# Patient Record
Sex: FEMALE | Race: WHITE | Employment: OTHER | URBAN - METROPOLITAN AREA
[De-identification: names, ages, dates, MRNs, and addresses within clinical notes are randomized per-mention and may not be internally consistent; named-entity substitution may affect disease eponyms.]

---

## 2017-01-25 ENCOUNTER — OFFICE VISIT (OUTPATIENT)
Dept: FAMILY MEDICINE CLINIC | Age: 67
End: 2017-01-25

## 2017-01-25 VITALS
HEIGHT: 62 IN | BODY MASS INDEX: 38.39 KG/M2 | HEART RATE: 80 BPM | DIASTOLIC BLOOD PRESSURE: 80 MMHG | WEIGHT: 208.6 LBS | OXYGEN SATURATION: 98 % | RESPIRATION RATE: 15 BRPM | SYSTOLIC BLOOD PRESSURE: 124 MMHG | TEMPERATURE: 97.6 F

## 2017-01-25 DIAGNOSIS — E03.9 ACQUIRED HYPOTHYROIDISM: Primary | ICD-10-CM

## 2017-01-25 DIAGNOSIS — I10 ESSENTIAL HYPERTENSION: ICD-10-CM

## 2017-01-25 PROCEDURE — 99213 OFFICE O/P EST LOW 20 MIN: CPT | Performed by: NURSE PRACTITIONER

## 2017-01-25 RX ORDER — LISINOPRIL AND HYDROCHLOROTHIAZIDE 25; 20 MG/1; MG/1
TABLET ORAL
Qty: 90 TABLET | Refills: 0 | Status: SHIPPED | OUTPATIENT
Start: 2017-01-25 | End: 2017-04-19 | Stop reason: SDUPTHER

## 2017-01-25 RX ORDER — ETODOLAC 400 MG/1
400 TABLET, FILM COATED ORAL 2 TIMES DAILY
Qty: 180 TABLET | Refills: 0 | Status: SHIPPED | OUTPATIENT
Start: 2017-01-25 | End: 2017-04-19 | Stop reason: SDUPTHER

## 2017-01-25 RX ORDER — LEVOTHYROXINE AND LIOTHYRONINE 76; 18 UG/1; UG/1
TABLET ORAL
Qty: 90 TABLET | Refills: 0 | Status: SHIPPED | OUTPATIENT
Start: 2017-01-25 | End: 2017-04-19 | Stop reason: SDUPTHER

## 2017-01-25 RX ORDER — TIZANIDINE 4 MG/1
4 TABLET ORAL EVERY 8 HOURS PRN
Qty: 90 TABLET | Refills: 0 | Status: SHIPPED | OUTPATIENT
Start: 2017-01-25 | End: 2017-04-25

## 2017-01-25 ASSESSMENT — ENCOUNTER SYMPTOMS
RESPIRATORY NEGATIVE: 1
ALLERGIC/IMMUNOLOGIC NEGATIVE: 1
EYES NEGATIVE: 1
GASTROINTESTINAL NEGATIVE: 1

## 2017-02-02 ENCOUNTER — OFFICE VISIT (OUTPATIENT)
Dept: ORTHOPEDIC SURGERY | Age: 67
End: 2017-02-02

## 2017-02-02 VITALS — HEIGHT: 62 IN | WEIGHT: 208 LBS | BODY MASS INDEX: 38.28 KG/M2

## 2017-02-02 DIAGNOSIS — G89.29 CHRONIC BILATERAL LOW BACK PAIN WITH LEFT-SIDED SCIATICA: ICD-10-CM

## 2017-02-02 DIAGNOSIS — M54.42 CHRONIC BILATERAL LOW BACK PAIN WITH LEFT-SIDED SCIATICA: ICD-10-CM

## 2017-02-02 DIAGNOSIS — M25.552 LEFT HIP PAIN: Primary | ICD-10-CM

## 2017-02-02 PROCEDURE — 99203 OFFICE O/P NEW LOW 30 MIN: CPT | Performed by: ORTHOPAEDIC SURGERY

## 2017-02-02 PROCEDURE — 73502 X-RAY EXAM HIP UNI 2-3 VIEWS: CPT | Performed by: ORTHOPAEDIC SURGERY

## 2017-02-02 PROCEDURE — 72100 X-RAY EXAM L-S SPINE 2/3 VWS: CPT | Performed by: ORTHOPAEDIC SURGERY

## 2017-02-02 RX ORDER — PREDNISONE 10 MG/1
TABLET ORAL
Qty: 30 TABLET | Refills: 0 | Status: SHIPPED | OUTPATIENT
Start: 2017-02-02 | End: 2017-09-12 | Stop reason: ALTCHOICE

## 2017-02-10 RX ORDER — CONJUGATED ESTROGENS 0.62 MG/1
TABLET, FILM COATED ORAL
Qty: 90 TABLET | Refills: 0 | OUTPATIENT
Start: 2017-02-10

## 2017-04-19 ENCOUNTER — OFFICE VISIT (OUTPATIENT)
Dept: FAMILY MEDICINE CLINIC | Age: 67
End: 2017-04-19

## 2017-04-19 VITALS
WEIGHT: 208.4 LBS | HEART RATE: 80 BPM | SYSTOLIC BLOOD PRESSURE: 124 MMHG | BODY MASS INDEX: 38.35 KG/M2 | RESPIRATION RATE: 16 BRPM | HEIGHT: 62 IN | DIASTOLIC BLOOD PRESSURE: 80 MMHG | OXYGEN SATURATION: 98 %

## 2017-04-19 DIAGNOSIS — E03.9 ACQUIRED HYPOTHYROIDISM: ICD-10-CM

## 2017-04-19 DIAGNOSIS — I10 ESSENTIAL HYPERTENSION: ICD-10-CM

## 2017-04-19 DIAGNOSIS — E55.9 VITAMIN D DEFICIENCY: ICD-10-CM

## 2017-04-19 DIAGNOSIS — M54.50 CHRONIC BILATERAL LOW BACK PAIN WITHOUT SCIATICA: Primary | ICD-10-CM

## 2017-04-19 DIAGNOSIS — N95.1 MENOPAUSAL SYMPTOMS: ICD-10-CM

## 2017-04-19 DIAGNOSIS — E78.00 HYPERCHOLESTEREMIA: ICD-10-CM

## 2017-04-19 DIAGNOSIS — G89.29 CHRONIC BILATERAL LOW BACK PAIN WITHOUT SCIATICA: Primary | ICD-10-CM

## 2017-04-19 PROCEDURE — 3014F SCREEN MAMMO DOC REV: CPT | Performed by: FAMILY MEDICINE

## 2017-04-19 PROCEDURE — 1036F TOBACCO NON-USER: CPT | Performed by: FAMILY MEDICINE

## 2017-04-19 PROCEDURE — G8427 DOCREV CUR MEDS BY ELIG CLIN: HCPCS | Performed by: FAMILY MEDICINE

## 2017-04-19 PROCEDURE — 1123F ACP DISCUSS/DSCN MKR DOCD: CPT | Performed by: FAMILY MEDICINE

## 2017-04-19 PROCEDURE — 4040F PNEUMOC VAC/ADMIN/RCVD: CPT | Performed by: FAMILY MEDICINE

## 2017-04-19 PROCEDURE — 3017F COLORECTAL CA SCREEN DOC REV: CPT | Performed by: FAMILY MEDICINE

## 2017-04-19 PROCEDURE — 1090F PRES/ABSN URINE INCON ASSESS: CPT | Performed by: FAMILY MEDICINE

## 2017-04-19 PROCEDURE — G8400 PT W/DXA NO RESULTS DOC: HCPCS | Performed by: FAMILY MEDICINE

## 2017-04-19 PROCEDURE — G8417 CALC BMI ABV UP PARAM F/U: HCPCS | Performed by: FAMILY MEDICINE

## 2017-04-19 PROCEDURE — 99214 OFFICE O/P EST MOD 30 MIN: CPT | Performed by: FAMILY MEDICINE

## 2017-04-19 RX ORDER — LISINOPRIL AND HYDROCHLOROTHIAZIDE 25; 20 MG/1; MG/1
TABLET ORAL
Qty: 90 TABLET | Refills: 3 | Status: SHIPPED | OUTPATIENT
Start: 2017-04-19 | End: 2017-08-22 | Stop reason: SDUPTHER

## 2017-04-19 RX ORDER — ETODOLAC 400 MG/1
400 TABLET, FILM COATED ORAL 2 TIMES DAILY
Qty: 180 TABLET | Refills: 3 | Status: SHIPPED | OUTPATIENT
Start: 2017-04-19 | End: 2018-06-12 | Stop reason: ALTCHOICE

## 2017-04-19 RX ORDER — ESTRADIOL 1 MG/1
.5-1 TABLET ORAL DAILY
Qty: 90 TABLET | Refills: 1 | Status: SHIPPED | OUTPATIENT
Start: 2017-04-19 | End: 2017-08-22 | Stop reason: SDUPTHER

## 2017-04-19 RX ORDER — LEVOTHYROXINE AND LIOTHYRONINE 76; 18 UG/1; UG/1
TABLET ORAL
Qty: 90 TABLET | Refills: 3 | Status: SHIPPED | OUTPATIENT
Start: 2017-04-19 | End: 2017-08-22 | Stop reason: SDUPTHER

## 2017-04-21 ENCOUNTER — NURSE ONLY (OUTPATIENT)
Dept: FAMILY MEDICINE CLINIC | Age: 67
End: 2017-04-21

## 2017-04-21 DIAGNOSIS — E78.00 HYPERCHOLESTEREMIA: ICD-10-CM

## 2017-04-21 DIAGNOSIS — I10 ESSENTIAL HYPERTENSION: ICD-10-CM

## 2017-04-21 DIAGNOSIS — E55.9 VITAMIN D DEFICIENCY: ICD-10-CM

## 2017-04-21 DIAGNOSIS — E03.9 ACQUIRED HYPOTHYROIDISM: ICD-10-CM

## 2017-04-21 LAB
A/G RATIO: 1.4 (ref 1.1–2.2)
ALBUMIN SERPL-MCNC: 4 G/DL (ref 3.4–5)
ALP BLD-CCNC: 88 U/L (ref 40–129)
ALT SERPL-CCNC: 14 U/L (ref 10–40)
ANION GAP SERPL CALCULATED.3IONS-SCNC: 13 MMOL/L (ref 3–16)
AST SERPL-CCNC: 13 U/L (ref 15–37)
BILIRUB SERPL-MCNC: 0.4 MG/DL (ref 0–1)
BUN BLDV-MCNC: 12 MG/DL (ref 7–20)
CALCIUM SERPL-MCNC: 9.5 MG/DL (ref 8.3–10.6)
CHLORIDE BLD-SCNC: 101 MMOL/L (ref 99–110)
CHOLESTEROL, TOTAL: 184 MG/DL (ref 0–199)
CO2: 28 MMOL/L (ref 21–32)
CREAT SERPL-MCNC: <0.5 MG/DL (ref 0.6–1.2)
GFR AFRICAN AMERICAN: >60
GFR NON-AFRICAN AMERICAN: >60
GLOBULIN: 2.8 G/DL
GLUCOSE BLD-MCNC: 93 MG/DL (ref 70–99)
HDLC SERPL-MCNC: 54 MG/DL (ref 40–60)
LDL CHOLESTEROL CALCULATED: 97 MG/DL
POTASSIUM SERPL-SCNC: 4.5 MMOL/L (ref 3.5–5.1)
SODIUM BLD-SCNC: 142 MMOL/L (ref 136–145)
T4 TOTAL: 8.2 UG/DL (ref 4.5–10.9)
TOTAL PROTEIN: 6.8 G/DL (ref 6.4–8.2)
TRIGL SERPL-MCNC: 167 MG/DL (ref 0–150)
TSH SERPL DL<=0.05 MIU/L-ACNC: 0.02 UIU/ML (ref 0.27–4.2)
VITAMIN D 25-HYDROXY: 32.4 NG/ML
VLDLC SERPL CALC-MCNC: 33 MG/DL

## 2017-04-21 PROCEDURE — 36415 COLL VENOUS BLD VENIPUNCTURE: CPT | Performed by: FAMILY MEDICINE

## 2017-05-12 ENCOUNTER — HOSPITAL ENCOUNTER (OUTPATIENT)
Dept: ENDOSCOPY | Age: 67
Discharge: HOME | End: 2017-05-12
Attending: INTERNAL MEDICINE | Admitting: INTERNAL MEDICINE

## 2017-08-22 RX ORDER — ESTRADIOL 1 MG/1
.5-1 TABLET ORAL DAILY
Qty: 90 TABLET | Refills: 3 | Status: SHIPPED | OUTPATIENT
Start: 2017-08-22 | End: 2018-10-08 | Stop reason: SDUPTHER

## 2017-08-22 RX ORDER — LISINOPRIL AND HYDROCHLOROTHIAZIDE 25; 20 MG/1; MG/1
TABLET ORAL
Qty: 90 TABLET | Refills: 3 | Status: SHIPPED | OUTPATIENT
Start: 2017-08-22 | End: 2018-06-12 | Stop reason: SDUPTHER

## 2017-08-22 RX ORDER — LEVOTHYROXINE AND LIOTHYRONINE 76; 18 UG/1; UG/1
TABLET ORAL
Qty: 90 TABLET | Refills: 3 | Status: SHIPPED | OUTPATIENT
Start: 2017-08-22 | End: 2017-09-12 | Stop reason: SDUPTHER

## 2017-08-25 ENCOUNTER — TELEPHONE (OUTPATIENT)
Dept: FAMILY MEDICINE CLINIC | Age: 67
End: 2017-08-25

## 2017-08-25 RX ORDER — OMEPRAZOLE 40 MG/1
40 CAPSULE, DELAYED RELEASE ORAL DAILY
Qty: 30 CAPSULE | Refills: 2 | Status: SHIPPED | OUTPATIENT
Start: 2017-08-25 | End: 2017-09-12 | Stop reason: SDUPTHER

## 2017-09-12 ENCOUNTER — OFFICE VISIT (OUTPATIENT)
Dept: FAMILY MEDICINE CLINIC | Age: 67
End: 2017-09-12

## 2017-09-12 VITALS
HEART RATE: 72 BPM | OXYGEN SATURATION: 98 % | HEIGHT: 62 IN | SYSTOLIC BLOOD PRESSURE: 118 MMHG | DIASTOLIC BLOOD PRESSURE: 74 MMHG | WEIGHT: 210 LBS | RESPIRATION RATE: 10 BRPM | BODY MASS INDEX: 38.64 KG/M2

## 2017-09-12 DIAGNOSIS — M25.552 LEFT HIP PAIN: ICD-10-CM

## 2017-09-12 DIAGNOSIS — Z23 NEED FOR PNEUMOCOCCAL VACCINATION: ICD-10-CM

## 2017-09-12 DIAGNOSIS — R00.2 PALPITATIONS: ICD-10-CM

## 2017-09-12 DIAGNOSIS — K21.9 GASTROESOPHAGEAL REFLUX DISEASE, ESOPHAGITIS PRESENCE NOT SPECIFIED: ICD-10-CM

## 2017-09-12 DIAGNOSIS — R63.5 WEIGHT GAIN: Primary | ICD-10-CM

## 2017-09-12 DIAGNOSIS — E03.9 ACQUIRED HYPOTHYROIDISM: ICD-10-CM

## 2017-09-12 DIAGNOSIS — R53.82 CHRONIC FATIGUE: ICD-10-CM

## 2017-09-12 DIAGNOSIS — R06.83 SNORING: ICD-10-CM

## 2017-09-12 DIAGNOSIS — R45.89 DEPRESSED MOOD: ICD-10-CM

## 2017-09-12 DIAGNOSIS — M25.562 ACUTE PAIN OF LEFT KNEE: ICD-10-CM

## 2017-09-12 DIAGNOSIS — I10 ESSENTIAL HYPERTENSION: ICD-10-CM

## 2017-09-12 PROCEDURE — 99214 OFFICE O/P EST MOD 30 MIN: CPT | Performed by: FAMILY MEDICINE

## 2017-09-12 PROCEDURE — G0009 ADMIN PNEUMOCOCCAL VACCINE: HCPCS | Performed by: FAMILY MEDICINE

## 2017-09-12 PROCEDURE — 90670 PCV13 VACCINE IM: CPT | Performed by: FAMILY MEDICINE

## 2017-09-12 PROCEDURE — 93000 ELECTROCARDIOGRAM COMPLETE: CPT | Performed by: FAMILY MEDICINE

## 2017-09-12 RX ORDER — OMEPRAZOLE 40 MG/1
40 CAPSULE, DELAYED RELEASE ORAL DAILY
Qty: 90 CAPSULE | Refills: 3 | Status: SHIPPED | OUTPATIENT
Start: 2017-09-12 | End: 2018-10-08 | Stop reason: SDUPTHER

## 2017-09-12 RX ORDER — LEVOTHYROXINE AND LIOTHYRONINE 76; 18 UG/1; UG/1
TABLET ORAL
Qty: 90 TABLET | Refills: 3 | Status: SHIPPED | OUTPATIENT
Start: 2017-09-12 | End: 2018-10-08 | Stop reason: SDUPTHER

## 2017-09-12 ASSESSMENT — PATIENT HEALTH QUESTIONNAIRE - PHQ9
SUM OF ALL RESPONSES TO PHQ QUESTIONS 1-9: 0
1. LITTLE INTEREST OR PLEASURE IN DOING THINGS: 0
SUM OF ALL RESPONSES TO PHQ9 QUESTIONS 1 & 2: 0
2. FEELING DOWN, DEPRESSED OR HOPELESS: 0

## 2017-09-18 ENCOUNTER — OFFICE VISIT (OUTPATIENT)
Dept: SLEEP MEDICINE | Age: 67
End: 2017-09-18

## 2017-09-18 VITALS
RESPIRATION RATE: 20 BRPM | BODY MASS INDEX: 38.87 KG/M2 | SYSTOLIC BLOOD PRESSURE: 126 MMHG | OXYGEN SATURATION: 97 % | HEART RATE: 89 BPM | HEIGHT: 62 IN | WEIGHT: 211.2 LBS | DIASTOLIC BLOOD PRESSURE: 80 MMHG

## 2017-09-18 DIAGNOSIS — G47.33 OSA (OBSTRUCTIVE SLEEP APNEA): Primary | ICD-10-CM

## 2017-09-18 PROCEDURE — 1036F TOBACCO NON-USER: CPT | Performed by: PSYCHIATRY & NEUROLOGY

## 2017-09-18 PROCEDURE — G8400 PT W/DXA NO RESULTS DOC: HCPCS | Performed by: PSYCHIATRY & NEUROLOGY

## 2017-09-18 PROCEDURE — 1090F PRES/ABSN URINE INCON ASSESS: CPT | Performed by: PSYCHIATRY & NEUROLOGY

## 2017-09-18 PROCEDURE — 99204 OFFICE O/P NEW MOD 45 MIN: CPT | Performed by: PSYCHIATRY & NEUROLOGY

## 2017-09-18 PROCEDURE — 3017F COLORECTAL CA SCREEN DOC REV: CPT | Performed by: PSYCHIATRY & NEUROLOGY

## 2017-09-18 PROCEDURE — G8417 CALC BMI ABV UP PARAM F/U: HCPCS | Performed by: PSYCHIATRY & NEUROLOGY

## 2017-09-18 PROCEDURE — 3014F SCREEN MAMMO DOC REV: CPT | Performed by: PSYCHIATRY & NEUROLOGY

## 2017-09-18 PROCEDURE — G8427 DOCREV CUR MEDS BY ELIG CLIN: HCPCS | Performed by: PSYCHIATRY & NEUROLOGY

## 2017-09-18 PROCEDURE — 4040F PNEUMOC VAC/ADMIN/RCVD: CPT | Performed by: PSYCHIATRY & NEUROLOGY

## 2017-09-18 PROCEDURE — 1123F ACP DISCUSS/DSCN MKR DOCD: CPT | Performed by: PSYCHIATRY & NEUROLOGY

## 2017-09-18 ASSESSMENT — ENCOUNTER SYMPTOMS
GASTROINTESTINAL NEGATIVE: 1
WHEEZING: 1
ALLERGIC/IMMUNOLOGIC NEGATIVE: 1
COUGH: 1
EYES NEGATIVE: 1

## 2017-09-18 ASSESSMENT — SLEEP AND FATIGUE QUESTIONNAIRES
HOW LIKELY ARE YOU TO NOD OFF OR FALL ASLEEP WHILE SITTING AND READING: 0
HOW LIKELY ARE YOU TO NOD OFF OR FALL ASLEEP WHEN YOU ARE A PASSENGER IN A CAR FOR AN HOUR WITHOUT A BREAK: 3
HOW LIKELY ARE YOU TO NOD OFF OR FALL ASLEEP WHILE SITTING QUIETLY AFTER LUNCH WITHOUT ALCOHOL: 0
HOW LIKELY ARE YOU TO NOD OFF OR FALL ASLEEP WHILE LYING DOWN TO REST IN THE AFTERNOON WHEN CIRCUMSTANCES PERMIT: 3
ESS TOTAL SCORE: 9
HOW LIKELY ARE YOU TO NOD OFF OR FALL ASLEEP WHILE SITTING AND TALKING TO SOMEONE: 0
NECK CIRCUMFERENCE (INCHES): 14.75
HOW LIKELY ARE YOU TO NOD OFF OR FALL ASLEEP WHILE SITTING INACTIVE IN A PUBLIC PLACE: 0
HOW LIKELY ARE YOU TO NOD OFF OR FALL ASLEEP IN A CAR, WHILE STOPPED FOR A FEW MINUTES IN TRAFFIC: 0
HOW LIKELY ARE YOU TO NOD OFF OR FALL ASLEEP WHILE WATCHING TV: 3

## 2017-10-07 ENCOUNTER — HOSPITAL ENCOUNTER (OUTPATIENT)
Dept: OTHER | Age: 67
Discharge: HOME | End: 2017-10-08
Attending: PSYCHIATRY & NEUROLOGY | Admitting: PSYCHIATRY & NEUROLOGY

## 2017-10-07 DIAGNOSIS — G47.33 OSA (OBSTRUCTIVE SLEEP APNEA): ICD-10-CM

## 2017-10-12 ENCOUNTER — TELEPHONE (OUTPATIENT)
Dept: PULMONOLOGY | Age: 67
End: 2017-10-12

## 2017-10-12 NOTE — TELEPHONE ENCOUNTER
Sleep study shows Mild BHUPENDRA with 7.2 events per hr and O2 desaturations to 86%    Dr Marcus Maciel advised

## 2017-10-25 ENCOUNTER — HOSPITAL ENCOUNTER (OUTPATIENT)
Dept: OTHER | Age: 67
Discharge: HOME | End: 2017-10-27
Admitting: DERMATOLOGY

## 2017-10-25 DIAGNOSIS — G47.33 OSA (OBSTRUCTIVE SLEEP APNEA): ICD-10-CM

## 2017-10-31 ENCOUNTER — TELEPHONE (OUTPATIENT)
Dept: PULMONOLOGY | Age: 67
End: 2017-10-31

## 2017-10-31 NOTE — TELEPHONE ENCOUNTER
Spoke to the patient regarding her  Titration results. She would like her orders sent to [address] .  She will call back to schedule f/u

## 2017-11-20 ENCOUNTER — TELEPHONE (OUTPATIENT)
Dept: PULMONOLOGY | Age: 67
End: 2017-11-20

## 2017-11-20 NOTE — TELEPHONE ENCOUNTER
Patient returned call. Gave her Sherri's message that she is still working on the order and will call patient back once she finds something out. Patient said thank you.

## 2017-11-20 NOTE — TELEPHONE ENCOUNTER
Pt called states has not heard from [address], spoke with [facility] who has emailed the [employer] and BILLIE Evangelista on 11/17/17 to find a status on order as of today [facility] the [employer] rep has not heard back from [address].   Left message to return call

## 2017-11-22 ENCOUNTER — TELEPHONE (OUTPATIENT)
Dept: PULMONOLOGY | Age: 67
End: 2017-11-22

## 2018-01-02 ENCOUNTER — OFFICE VISIT (OUTPATIENT)
Dept: ORTHOPEDIC SURGERY | Age: 68
End: 2018-01-02

## 2018-01-02 VITALS — HEIGHT: 62 IN | BODY MASS INDEX: 38.83 KG/M2 | WEIGHT: 211 LBS

## 2018-01-02 DIAGNOSIS — M25.462 EFFUSION OF LEFT KNEE: ICD-10-CM

## 2018-01-02 DIAGNOSIS — G89.29 CHRONIC PAIN OF LEFT KNEE: Primary | ICD-10-CM

## 2018-01-02 DIAGNOSIS — M25.562 CHRONIC PAIN OF LEFT KNEE: Primary | ICD-10-CM

## 2018-01-02 PROCEDURE — 99214 OFFICE O/P EST MOD 30 MIN: CPT | Performed by: ORTHOPAEDIC SURGERY

## 2018-01-02 PROCEDURE — 73564 X-RAY EXAM KNEE 4 OR MORE: CPT | Performed by: ORTHOPAEDIC SURGERY

## 2018-01-02 RX ORDER — METHYLPREDNISOLONE 4 MG/1
TABLET ORAL
Qty: 1 KIT | Refills: 0 | Status: SHIPPED | OUTPATIENT
Start: 2018-01-02 | End: 2018-06-12 | Stop reason: ALTCHOICE

## 2018-01-05 ENCOUNTER — TELEPHONE (OUTPATIENT)
Dept: ORTHOPEDIC SURGERY | Age: 68
End: 2018-01-05

## 2018-01-12 ENCOUNTER — OFFICE VISIT (OUTPATIENT)
Dept: ORTHOPEDIC SURGERY | Age: 68
End: 2018-01-12

## 2018-01-12 VITALS — WEIGHT: 210.98 LBS | BODY MASS INDEX: 38.83 KG/M2 | HEIGHT: 62 IN

## 2018-01-12 DIAGNOSIS — M17.10 PATELLOFEMORAL ARTHRITIS: Primary | ICD-10-CM

## 2018-01-12 PROCEDURE — G8483 FLU IMM NO ADMIN DOC REA: HCPCS | Performed by: ORTHOPAEDIC SURGERY

## 2018-01-12 PROCEDURE — 4040F PNEUMOC VAC/ADMIN/RCVD: CPT | Performed by: ORTHOPAEDIC SURGERY

## 2018-01-12 PROCEDURE — 3017F COLORECTAL CA SCREEN DOC REV: CPT | Performed by: ORTHOPAEDIC SURGERY

## 2018-01-12 PROCEDURE — 1036F TOBACCO NON-USER: CPT | Performed by: ORTHOPAEDIC SURGERY

## 2018-01-12 PROCEDURE — 1090F PRES/ABSN URINE INCON ASSESS: CPT | Performed by: ORTHOPAEDIC SURGERY

## 2018-01-12 PROCEDURE — G8427 DOCREV CUR MEDS BY ELIG CLIN: HCPCS | Performed by: ORTHOPAEDIC SURGERY

## 2018-01-12 PROCEDURE — 99213 OFFICE O/P EST LOW 20 MIN: CPT | Performed by: ORTHOPAEDIC SURGERY

## 2018-01-12 PROCEDURE — 1123F ACP DISCUSS/DSCN MKR DOCD: CPT | Performed by: ORTHOPAEDIC SURGERY

## 2018-01-12 PROCEDURE — 3014F SCREEN MAMMO DOC REV: CPT | Performed by: ORTHOPAEDIC SURGERY

## 2018-01-12 PROCEDURE — G8400 PT W/DXA NO RESULTS DOC: HCPCS | Performed by: ORTHOPAEDIC SURGERY

## 2018-01-12 PROCEDURE — G8417 CALC BMI ABV UP PARAM F/U: HCPCS | Performed by: ORTHOPAEDIC SURGERY

## 2018-01-12 RX ORDER — MELOXICAM 15 MG/1
15 TABLET ORAL DAILY
Qty: 90 TABLET | Refills: 3 | Status: SHIPPED | OUTPATIENT
Start: 2018-01-12 | End: 2019-07-24 | Stop reason: SDUPTHER

## 2018-01-12 NOTE — PROGRESS NOTES
Chief Complaint    Results (MRI results)      History of Present Illness:  Bubba Zamora is a 78 y.o. female. She is here for follow-up for her left knee. She did have an MRI completed on the knee and is here today for the results. She does continue to have pain within the left knee. Medical History:  Patient's medications, allergies, past medical, surgical, social and family histories were reviewed and updated as appropriate. Review of Systems:  Pertinent items are noted in HPI  Review of systems reviewed from Patient History Form dated on 1/12/18 and available in the patient's chart under the Media tab. Vital Signs:  Ht 5' 2.01\" (1.575 m)   Wt 210 lb 15.7 oz (95.7 kg)   BMI 38.58 kg/m²     General Exam:   Constitutional: Patient is adequately groomed with no evidence of malnutrition  DTRs: Deep tendon reflexes are intact  Mental Status: The patient is oriented to time, place and person. The patient's mood and affect are appropriate. Knee Examination:    Inspection:  No significant swelling erythema noted about the left knee today     Palpation:  There is significant tenderness to palpation along the medial joint line. There is mild palpable effusion within the left knee.     Range of Motion:  Extension of the knee is to 0° although she has pain at full extension, knee flexion is 125°     Strength:  She is able to do straight leg raise     Special Tests:  Negative Lachman exam.  No instability to varus and valgus stress testing. Negative posterior drawer. She does have reproduction of her pain with both Apley and Montrell exam     Skin: There are no rashes, ulcerations or lesions.     Gait: She is walking with an antalgic gait    Additional Comments:       Additional Examinations:         Right Lower Extremity: Examination of the right lower extremity does not show any tenderness, deformity or injury. Range of motion is unremarkable. There is no gross instability.   There are no

## 2018-02-13 ENCOUNTER — OFFICE VISIT (OUTPATIENT)
Dept: SLEEP MEDICINE | Age: 68
End: 2018-02-13

## 2018-02-13 VITALS
SYSTOLIC BLOOD PRESSURE: 120 MMHG | HEIGHT: 62 IN | BODY MASS INDEX: 38.13 KG/M2 | TEMPERATURE: 97.9 F | DIASTOLIC BLOOD PRESSURE: 74 MMHG | RESPIRATION RATE: 16 BRPM | WEIGHT: 207.2 LBS | OXYGEN SATURATION: 97 % | HEART RATE: 86 BPM

## 2018-02-13 DIAGNOSIS — Z99.89 OSA ON CPAP: Primary | ICD-10-CM

## 2018-02-13 DIAGNOSIS — G47.33 OSA ON CPAP: Primary | ICD-10-CM

## 2018-02-13 PROCEDURE — 1090F PRES/ABSN URINE INCON ASSESS: CPT | Performed by: PSYCHIATRY & NEUROLOGY

## 2018-02-13 PROCEDURE — 99213 OFFICE O/P EST LOW 20 MIN: CPT | Performed by: PSYCHIATRY & NEUROLOGY

## 2018-02-13 PROCEDURE — 4004F PT TOBACCO SCREEN RCVD TLK: CPT | Performed by: PSYCHIATRY & NEUROLOGY

## 2018-02-13 PROCEDURE — G8427 DOCREV CUR MEDS BY ELIG CLIN: HCPCS | Performed by: PSYCHIATRY & NEUROLOGY

## 2018-02-13 PROCEDURE — 1123F ACP DISCUSS/DSCN MKR DOCD: CPT | Performed by: PSYCHIATRY & NEUROLOGY

## 2018-02-13 PROCEDURE — 3017F COLORECTAL CA SCREEN DOC REV: CPT | Performed by: PSYCHIATRY & NEUROLOGY

## 2018-02-13 PROCEDURE — 3014F SCREEN MAMMO DOC REV: CPT | Performed by: PSYCHIATRY & NEUROLOGY

## 2018-02-13 PROCEDURE — G8483 FLU IMM NO ADMIN DOC REA: HCPCS | Performed by: PSYCHIATRY & NEUROLOGY

## 2018-02-13 PROCEDURE — G8400 PT W/DXA NO RESULTS DOC: HCPCS | Performed by: PSYCHIATRY & NEUROLOGY

## 2018-02-13 PROCEDURE — G8417 CALC BMI ABV UP PARAM F/U: HCPCS | Performed by: PSYCHIATRY & NEUROLOGY

## 2018-02-13 PROCEDURE — 4040F PNEUMOC VAC/ADMIN/RCVD: CPT | Performed by: PSYCHIATRY & NEUROLOGY

## 2018-02-13 ASSESSMENT — ENCOUNTER SYMPTOMS
RESPIRATORY NEGATIVE: 1
EYES NEGATIVE: 1
ALLERGIC/IMMUNOLOGIC NEGATIVE: 1
GASTROINTESTINAL NEGATIVE: 1

## 2018-02-13 ASSESSMENT — SLEEP AND FATIGUE QUESTIONNAIRES
HOW LIKELY ARE YOU TO NOD OFF OR FALL ASLEEP WHILE SITTING INACTIVE IN A PUBLIC PLACE: 0
HOW LIKELY ARE YOU TO NOD OFF OR FALL ASLEEP WHILE WATCHING TV: 2
HOW LIKELY ARE YOU TO NOD OFF OR FALL ASLEEP WHILE LYING DOWN TO REST IN THE AFTERNOON WHEN CIRCUMSTANCES PERMIT: 0
HOW LIKELY ARE YOU TO NOD OFF OR FALL ASLEEP IN A CAR, WHILE STOPPED FOR A FEW MINUTES IN TRAFFIC: 0
HOW LIKELY ARE YOU TO NOD OFF OR FALL ASLEEP WHILE SITTING AND TALKING TO SOMEONE: 0
HOW LIKELY ARE YOU TO NOD OFF OR FALL ASLEEP WHEN YOU ARE A PASSENGER IN A CAR FOR AN HOUR WITHOUT A BREAK: 0
HOW LIKELY ARE YOU TO NOD OFF OR FALL ASLEEP WHILE SITTING AND READING: 0
ESS TOTAL SCORE: 2
HOW LIKELY ARE YOU TO NOD OFF OR FALL ASLEEP WHILE SITTING QUIETLY AFTER LUNCH WITHOUT ALCOHOL: 0

## 2018-02-13 NOTE — PATIENT INSTRUCTIONS
Patient Education        Learning About CPAP for Sleep Apnea  What is CPAP? CPAP is a small machine that you use at home every night while you sleep. It increases air pressure in your throat to keep your airway open. When you have sleep apnea, this can help you sleep better so you feel much better. CPAP stands for \"continuous positive airway pressure. \"  The CPAP machine will have one of the following:  · A mask that covers your nose and mouth  · Prongs that fit into your nose  · A mask that covers your nose only, the most common type. This type is called NCPAP. The N stands for \"nasal.\"  Why is it done? CPAP is usually the best treatment for obstructive sleep apnea. It is the first treatment choice and the most widely used. Your doctor may suggest CPAP if you have:  · Moderate to severe sleep apnea. · Sleep apnea and coronary artery disease (CAD) or heart failure. How does it help? · CPAP can help you have more normal sleep, so you feel less sleepy and more alert during the daytime. · CPAP may help keep heart failure or other heart problems from getting worse. · CPAP may help lower your blood pressure. · If you use CPAP, your bed partner may also sleep better because you are not snoring or restless. What are the side effects? Some people who use CPAP have:  · A dry or stuffy nose and a sore throat. · Irritated skin on the face. · Sore eyes. · Bloating. If you have any of these problems, work with your doctor to fix them. Here are some things you can try:  · Be sure the mask or nasal prongs fit well. · See if your doctor can adjust the pressure of your CPAP. · If your nose is dry, try a humidifier. · If your nose is runny or stuffy, try decongestant medicine or a steroid nasal spray. Be safe with medicines. Read and follow all instructions on the label. Do not use the medicine longer than the label says. If these things do not help, you might try a different type of machine.  Some machines have

## 2018-02-13 NOTE — PROGRESS NOTES
Packs/day: 1.00     Years: 22.00     Last attempt to quit: 5/20/2015    Smokeless tobacco: Never Used      Comment: 9-10 per day    Alcohol use No    Drug use: No    Sexual activity: Not on file     Other Topics Concern    Not on file     Social History Narrative    Caffeine: SODA - 0 TEA - 0  COFFEE - 1.5 cups in the morning               Prior to Admission medications    Medication Sig Start Date End Date Taking? Authorizing Provider   meloxicam (MOBIC) 15 MG tablet Take 1 tablet by mouth daily 1/12/18  Yes Millicent Monet MD   methylPREDNISolone (MEDROL, LOLY,) 4 MG tablet Take by mouth. 1/2/18  Yes Millicent Monet MD   omeprazole (PRILOSEC) 40 MG delayed release capsule Take 1 capsule by mouth daily 9/12/17  Yes Ana Valentin MD   thyroid [facility] THYROID) 120 MG tablet TAKE 1 TAB BY MOUTH DAILY 9/12/17  Yes Ana Valentin MD   estradiol (ESTRACE) 1 MG tablet Take 0.5-1 tablets by mouth daily 8/22/17  Yes Charlee Patrick NP   lisinopril-hydrochlorothiazide (PRINZIDE;ZESTORETIC) 20-25 MG per tablet TAKE 1 TABLET BY MOUTH EVERY DAY 8/22/17  Yes Charlee Patrick NP   etodolac (LODINE) 400 MG tablet Take 1 tablet by mouth 2 times daily 4/19/17 4/19/18 Yes Ana Valentin MD   Cholecalciferol (VITAMIN D3) 2000 UNITS TABS Take 2 tablets by mouth daily   Yes Historical Provider, MD   Multiple Vitamins-Minerals (MULTIVITAMIN PO) Take 1 tablet by mouth daily   Yes Historical Provider, MD   Omega-3 Fatty Acids (FISH OIL) 1000 MG CAPS Take 3,000 mg by mouth daily   Yes Historical Provider, MD   calcipotriene (DOVONEX) 0.005 % cream Apply topically 2 times daily. 6/10/15  Yes Ana Valentin MD   POTASSIUM PO Take 1 tablet by mouth daily.      Yes Historical Provider, MD       Allergies as of 02/13/2018 - Review Complete 02/13/2018   Allergen Reaction Noted    Codeine  04/14/2011       Patient Active Problem List   Diagnosis    Hypothyroid    Hypertension    Knee pain    Follow-up exam    Right knee pain    Delayed gastric emptying    Effusion of left knee       Past Medical History:   Diagnosis Date    Delayed gastric emptying 3/27/2014    Hayfever     Hypertension     Hypothyroid 4/20/2011    Right knee pain 7/12/2013       Past Surgical History:   Procedure Laterality Date    ADENOIDECTOMY      AS A CHILD    COLONOSCOPY  2008    normal    HYSTERECTOMY      bilat ovaries    TONSILLECTOMY      AS CHILD    WISDOM TOOTH EXTRACTION         Family History   Problem Relation Age of Onset    Dementia Mother     Mental Illness Mother     High Blood Pressure Mother     Heart Disease Father     High Blood Pressure Father     Stroke Maternal Grandmother     Heart Disease Other      dad's family       Review of Systems   Constitutional: Negative. Negative for fatigue (resolved). HENT: Negative. Eyes: Negative. Respiratory: Negative. Cardiovascular: Negative. Gastrointestinal: Negative. Endocrine: Negative. Genitourinary: Negative for frequency (improved). Musculoskeletal: Negative. Skin: Negative. Allergic/Immunologic: Negative. Neurological: Negative. Hematological: Negative. All other systems reviewed and are negative. Objective:     Vitals:  Weight BMI Neck circumference    Wt Readings from Last 3 Encounters:   02/13/18 207 lb 3.2 oz (94 kg)   01/12/18 210 lb 15.7 oz (95.7 kg)   01/02/18 211 lb (95.7 kg)    Body mass index is 37.9 kg/m². BP HR SaO2   BP Readings from Last 3 Encounters:   02/13/18 120/74   09/18/17 126/80   09/12/17 118/74    Pulse Readings from Last 3 Encounters:   02/13/18 86   09/18/17 89   09/12/17 72    SpO2 Readings from Last 3 Encounters:   02/13/18 97%   09/18/17 97%   09/12/17 98%          The mandibular molar Class :   [x]1 []2 []3      Mallampati I Airway Classification:   []1 []2 [x]3 []4      Physical Exam   Constitutional: She appears well-nourished. HENT:   Nose: Nose normal.   Eyes: EOM are normal.   Neck: Neck supple. Cardiovascular: Normal rate, normal heart sounds and intact distal pulses. Pulmonary/Chest: Effort normal. She has no wheezes. Musculoskeletal: She exhibits edema (trace LE). Neurological: She is alert. She has normal reflexes. Skin: Skin is warm. Psychiatric: She has a normal mood and affect. Nursing note and vitals reviewed. Assessment:   Mild Obstructive Sleep Apnea/Hypopnea Syndrome under good control on PAP at 8 cmwp. 1. BHUPENDRA on CPAP       Plan: Will continue the PAP at 8 cmwp, I encouraged the patient to use the PAP on nightly basis. I will add heated hose for dry mouth. I will order PAP supplies, mask, filters. ... No orders of the defined types were placed in this encounter. Return in about 1 year (around 2/13/2019) for Reveiwing CPAP usage and compliance report and tro.     Diane Bo MD  Medical Director [address] [place] [address]

## 2018-02-15 NOTE — PROGRESS NOTES
Results for the following test have been finalized and entered into the patients chart
rule out medial meniscus tear. She is also asking if she could try an oral steroid to help calm down some of her symptoms we will place her onto an oral steroid today.   We will see her back once that MRI of the knee is completed

## 2018-02-23 ENCOUNTER — HOSPITAL ENCOUNTER (OUTPATIENT)
Dept: PHYSICAL THERAPY | Age: 68
Discharge: HOME | End: 2018-02-28
Admitting: ORTHOPAEDIC SURGERY

## 2018-02-23 NOTE — FLOWSHEET NOTE
[address], [facility]    Physical Therapy Daily Treatment Note  Date:  2018    Patient Name:  Geoffrey Egan    :  1950  MRN: [id number]  Restrictions/Precautions:    Medical/Treatment Diagnosis Information:  · Diagnosis: Patellofemoral arthritis (M17.10)  · Treatment Diagnosis: Left knee pain   Insurance/Certification information:  PT Insurance Information: Medicare - PT visits based on [place]  Physician Information:  Referring Practitioner: Dr. Olesya Abebe of care signed (Y/N):     Date of Patient follow up with Physician:     G-Code (if applicable):      Date G-Code Applied:    PT G-Codes  Functional Assessment Tool Used: LEFS  Score: 59%  Functional Limitation: Mobility: Walking and moving around  Mobility: Walking and Moving Around Current Status (): At least 40 percent but less than 60 percent impaired, limited or restricted  Mobility: Walking and Moving Around Goal Status ():  At least 20 percent but less than 40 percent impaired, limited or restricted    Progress Note: [x]  Yes  []  No  Next due by: Visit #10       Latex Allergy:  [x]NO      []YES  Preferred Language for Healthcare:   [x]English       []other:    Visit # Insurance Allowable   1 [place]     Pain level:  0-5/10     SUBJECTIVE:  See eval    OBJECTIVE: See eval  Observation:   Test measurements:      RESTRICTIONS/PRECAUTIONS: L knee PF arthritis, small lateral meniscus tear; chronic R knee pain after patellar fx in      Exercises/Interventions:     Therapeutic Ex Resistance Sets/sec Reps Notes   Retro Stepper/BIKE       Sportcord March       SLR flex/abd  2 10    SAQ       Clam ABD       Hip Ext /table       Bosu fwd/side lunge       Slide Lunge       Leg Press Ecc 0-       Cybex HS curl       TKE       Glute side walks       BOSU squat       Calf stretch with towel  30\" 3    Hamstring stretch EOB  30\" 3    Quad sets w/towel  5\" 20           Manual Intervention

## 2018-02-23 NOTE — PLAN OF CARE
[place] [address]  Phone [phone]   Fax [phone]                                                       Physical Therapy Certification    Dear Referring Practitioner: Dr. Eddi Franco,    We had the pleasure of evaluating the following patient for physical therapy services at [address] [place] [address]. A summary of our findings can be found in the initial assessment below. This includes our plan of care. If you have any questions or concerns regarding these findings, please do not hesitate to contact me at the office phone number checked above. Thank you for the referral.       Physician Signature:_______________________________Date:__________________  By signing above (or electronic signature), therapists plan is approved by physician      Patient: Roger Askew   : 1950   MRN: [id number]  Referring Physician: Referring Practitioner: Dr. Eddi Franco      Evaluation Date: 2018      Medical Diagnosis Information:  Diagnosis: Patellofemoral arthritis (M17.10)   Treatment Diagnosis: Left knee pain                                          Insurance information: PT Insurance Information: Medicare - PT visits based on [place]     Precautions/ Contra-indications: None  Latex Allergy:  [x]NO      []YES  Preferred Language for Healthcare:   [x]English       []other:    SUBJECTIVE: Patient stated complaint:  Pt reports that the beginning of last fall, she reached down to pick something up off of the steps, lost her balance when she turned, and fell down a couple of steps. PT states that she landed on her R knee, but her L knee was twisted behind her, and she began having lateral knee pain.   Pt also reports that a few weeks prior to this incident, she tripped over the front of her pontoon boat and hit the inside of both of her knees. Pt states that she saw Dr. Humble Chavarria because the lateral knee pain got to be pretty bad. X-ray showed mild PF arthritis, MRI (+) for small lateral meniscus tear. Pt has been taking Meloxicam, which she thinks has helped a little bit. Pt states that most of her pain currently is when she pivots on her foot, gets in/out of the car, and going down stairs. Dr. Humble Chavarria would like pt to try PT and then consider injection if no change in sxs. Relevant Medical History: Chronic LBP, hypothyroidism, chronic R knee pain after patellar fx in [id number] [facility] Index:PT G-Codes  Functional Assessment Tool Used: LEFS  Score: 59%  Functional Limitation: Mobility: Walking and moving around  Mobility: Walking and Moving Around Current Status (): At least 40 percent but less than 60 percent impaired, limited or restricted  Mobility: Walking and Moving Around Goal Status (): At least 20 percent but less than 40 percent impaired, limited or restricted    Pain Scale: 0-5/10  Easing factors: Meloxicam, rest  Provocative factors: pivoting on L foot, getting in/out of car, going down steps, standing, walking, squatting, kneeling    Type: []Constant   [x]Intermittent  []Radiating [x]Localized []other:     Numbness/Tingling: Pt denies N/T    Occupation/School: Retired      Living Status/Prior Level of Function: Independent with ADLs and IADLs. PT enjoys walking for exercise.      OBJECTIVE:     ROM LEFT RIGHT   HIP Flex WNL WNL   HIP Abd     HIP Ext     HIP IR WNL WNL   HIP ER WNL WNL   Knee ext  lacking 13 Lacking 10   Knee Flex 125 125   Ankle PF     Ankle DF     Ankle In     Ankle Ev     Strength  LEFT RIGHT   HIP Flexors 4 4+   HIP Abductors 4- 4+   HIP Ext     Hip ER     Knee EXT (quad) 4- 5   Knee Flex (HS) 4 5   Ankle DF 4+ 5   Ankle PF     Ankle Inv     Ankle EV            Reflexes/Sensation:    [x]Dermatomes/Myotomes intact    [x]Reflexes equal and normal bilaterally   []Other:    Joint in self care activities   [x]Reduced participation in home management activities   []Reduced participation in work activities   [x]Reduced participation in social activities. [x]Reduced participation in sport/recreation activities. Classification :    []Signs/symptoms consistent with post-surgical status including decreased ROM, strength and function.    []Signs/symptoms consistent with joint sprain/strain   [x]Signs/symptoms consistent with patella-femoral syndrome   [x]Signs/symptoms consistent with knee OA/hip OA   []Signs/symptoms consistent with internal derangement of knee/Hip   []Signs/symptoms consistent with functional hip weakness/NMR control      []Signs/symptoms consistent with tendinitis/tendinosis    []signs/symptoms consistent with pathology which may benefit from Dry needling      []other:      Prognosis/Rehab Potential:      []Excellent   [x]Good    []Fair   []Poor    Tolerance of evaluation/treatment:    []Excellent   [x]Good    []Fair   []Poor    Physical Therapy Evaluation Complexity Justification  [x] A history of present problem with:  [] no personal factors and/or comorbidities that impact the plan of care;  [x]1-2 personal factors and/or comorbidities that impact the plan of care  []3 personal factors and/or comorbidities that impact the plan of care  [x] An examination of body systems using standardized tests and measures addressing any of the following: body structures and functions (impairments), activity limitations, and/or participation restrictions;:  [x] a total of 1-2 or more elements   [] a total of 3 or more elements   [] a total of 4 or more elements   [x] A clinical presentation with:  [x] stable and/or uncomplicated characteristics   [] evolving clinical presentation with changing characteristics  [] unstable and unpredictable characteristics;   [x] Clinical decision making of [x] low, [] moderate, [] high complexity using standardized patient assessment instrument and/or measurable assessment of functional outcome. [x] EVAL (LOW) 37439 (typically 30 minutes face-to-face)  [] EVAL (MOD) 75185 (typically 30 minutes face-to-face)  [] EVAL (HIGH) 30449 (typically 45 minutes face-to-face)  [] RE-EVAL     PLAN:   Frequency/Duration:  1 days per week for 4-6 Weeks. (Pt is 1x/week due to living 1 hour away). Interventions:  [x]  Therapeutic exercise including: strength training, ROM, for Lower extremity and core   [x]  NMR activation and proprioception for LE, Glutes and Core   [x]  Manual therapy as indicated for LE, Hip and spine to include: Dry Needling/IASTM, STM, PROM, Gr I-IV mobilizations, manipulation. [x] Modalities as needed that may include: thermal agents, E-stim, Biofeedback, US, iontophoresis as indicated  [x] Patient education on joint protection, postural re-education, activity modification, progression of HEP. HEP instruction: Patient instructed in, and demonstrated proper form of, exercises. Copy of exercises scanned into media file. GOALS:  Patient stated goal: \"To feel better and manage pain\"    Therapist goals for Patient:   Short Term Goals: To be achieved in: 2 weeks  1. Independent in HEP and progression per patient tolerance, in order to prevent re-injury. 2. Patient will have a decrease in pain to facilitate improvement in movement, function, and ADLs as indicated by Functional Deficits. Long Term Goals: To be achieved in: 4-6 weeks  1. Disability index score of 40% or less for the LEFS to assist with reaching prior level of function. 2. Patient will demonstrate increased AROM to WNL for L knee to allow for proper joint functioning as indicated by patients Functional Deficits. 3. Patient will demonstrate an increase in Strength to good proximal hip strength and control, within 5lb HHD in LE to allow for proper functional mobility as indicated by patients Functional Deficits.    4. Patient will return to going up and down 1 flight of stairs

## 2018-03-01 ENCOUNTER — HOSPITAL ENCOUNTER (OUTPATIENT)
Dept: PHYSICAL THERAPY | Age: 68
Discharge: HOME | End: 2018-03-02
Admitting: ORTHOPAEDIC SURGERY

## 2018-03-01 ENCOUNTER — HOSPITAL ENCOUNTER (OUTPATIENT)
Dept: PHYSICAL THERAPY | Age: 68
Discharge: HOME | End: 2018-03-31
Attending: ORTHOPAEDIC SURGERY | Admitting: ORTHOPAEDIC SURGERY

## 2018-03-01 ENCOUNTER — TELEPHONE (OUTPATIENT)
Dept: FAMILY MEDICINE CLINIC | Age: 68
End: 2018-03-01

## 2018-03-01 NOTE — FLOWSHEET NOTE
[address], [facility]    Physical Therapy Daily Treatment Note  Date:  3/1/2018    Patient Name:  Yaneth Turner    :  1950  MRN: [id number]  Restrictions/Precautions:    Medical/Treatment Diagnosis Information:  · Diagnosis: Patellofemoral arthritis (M17.10)  · Treatment Diagnosis: Left knee pain   Insurance/Certification information:  PT Insurance Information: Medicare - PT visits based on [place]  Physician Information:  Referring Practitioner: Dr. Lesa Walters of care signed (Y/N):     Date of Patient follow up with Physician:     G-Code (if applicable):      Date G-Code Applied:         Progress Note: [x]  Yes  []  No  Next due by: Visit #10       Latex Allergy:  [x]NO      []YES  Preferred Language for Healthcare:   [x]English       []other:    Visit # Insurance Allowable   2 [place]     Pain level:  0-3/10     SUBJECTIVE:  Pt reports that her knee has been feeling really good overall. Pt states that she still has difficulty kneeling and still has some pain in her knee if she pivots on her foot wrong. Pt reports HEP compliance.      OBJECTIVE:  Observation:   Test measurements:    3/1/18: Knee ext AROM = lacking 7    RESTRICTIONS/PRECAUTIONS: L knee PF arthritis, small lateral meniscus tear; chronic R knee pain after patellar fx in      Exercises/Interventions:     Therapeutic Ex Resistance Sets/sec Reps Notes   Bike 6'             SLR flex/abd  2 10    SAQ  2 10    Clam ABD teal 2 10    Bridges  2 10    Bosu fwd/side lunge       Slide Lunge       Leg Press Ecc 0-       Cybex HS curl       TKE blue 5\" 20    Glute side walks       BOSU squat       Calf stretch with towel  30\" 3    Hamstring stretch EOB  Hold due to LBP   Quad sets w/towel  5\" 20    Standing HR  2 10           Manual Intervention       Knee mobs/PROM 10'   ext   Tib/Fem Mobs       Patella Mobs 2'      Ankle mobs                     NMR re-education [place]/Biofeedback 10/10       G. Med activaiton/sidelying       G. Max Activation/prone       Hip Ext full ROM G. Activation       Bosu Bal and Prop- G Med       Single leg stance/Balance/Prop       Bosu Retro G. Med act       Tandem balance  30\" 3               Therapeutic Exercise and NMR EXR  [x] (96901) Provided verbal/tactile cueing for activities related to strengthening, flexibility, endurance, ROM for improvements in LE, proximal hip, and core control with self care, mobility, lifting, ambulation.  [] (35473) Provided verbal/tactile cueing for activities related to improving balance, coordination, kinesthetic sense, posture, motor skill, proprioception  to assist with LE, proximal hip, and core control in self care, mobility, lifting, ambulation and eccentric single leg control.      NMR and Therapeutic Activities:    [] (13772 or 67216) Provided verbal/tactile cueing for activities related to improving balance, coordination, kinesthetic sense, posture, motor skill, proprioception and motor activation to allow for proper function of core, proximal hip and LE with self care and ADLs  [] (85180) Gait Re-education- Provided training and instruction to the patient for proper LE, core and proximal hip recruitment and positioning and eccentric body weight control with ambulation re-education including up and down stairs     Home Exercise Program:    [x] (99585) Reviewed/Progressed HEP activities related to strengthening, flexibility, endurance, ROM of core, proximal hip and LE for functional self-care, mobility, lifting and ambulation/stair navigation   [] (01536)Reviewed/Progressed HEP activities related to improving balance, coordination, kinesthetic sense, posture, motor skill, proprioception of core, proximal hip and LE for self care, mobility, lifting, and ambulation/stair navigation      Manual Treatments:  PROM / STM / Oscillations-Mobs:  G-I, II, III, IV (PA's, Inf., Post.)  [x] (61422) Provided manual therapy to mobilize LE, proximal hip and/or LS spine soft tissue/joints for the purpose of modulating pain, promoting relaxation,  increasing ROM, reducing/eliminating soft tissue swelling/inflammation/restriction, improving soft tissue extensibility and allowing for proper ROM for normal function with self care, mobility, lifting and ambulation. Modalities:  Declined ice    Charges:  Timed Code Treatment Minutes: 45   Total Treatment Minutes: 45     [] EVAL  [x] DY(18007) x  2   [] IONTO  [] NMR (78270) x      [] VASO  [x] Manual (90373) x  1    [] Other:  [] TA x       [] Mech Traction (21658)  [] ES(attended) (00130)      [] ES (un) (80399):     GOALS:   Patient stated goal: \"To feel better and manage pain\"     Therapist goals for Patient:   Short Term Goals: To be achieved in: 2 weeks  1. Independent in HEP and progression per patient tolerance, in order to prevent re-injury. 2. Patient will have a decrease in pain to facilitate improvement in movement, function, and ADLs as indicated by Functional Deficits.     Long Term Goals: To be achieved in: 4-6 weeks  1. Disability index score of 40% or less for the LEFS to assist with reaching prior level of function. 2. Patient will demonstrate increased AROM to WNL for L knee to allow for proper joint functioning as indicated by patients Functional Deficits. 3. Patient will demonstrate an increase in Strength to good proximal hip strength and control, within 5lb HHD in LE to allow for proper functional mobility as indicated by patients Functional Deficits. 4. Patient will return to going up and down 1 flight of stairs without increased symptoms or restriction. 5. Patient will be able to walk 1 hour without increased symptoms or restriction. Progression Towards Functional goals:  [] Patient is progressing as expected towards functional goals listed. [] Progression is slowed due to complexities listed.   [] Progression has been slowed due to co-morbidities. [x] Plan just implemented, too soon to assess goals progression  [] Other:     ASSESSMENT:  Pt tolerated session well today with no increased knee pain noted. Pt demonstrated improved knee extension AROM today, showing progress towards LTGs. Treatment/Activity Tolerance:  [x] Patient tolerated treatment well [] Patient limited by fatique  [] Patient limited by pain  [] Patient limited by other medical complications  [] Other:     Prognosis: [x] Good [] Fair  [] Poor    Patient Requires Follow-up: [x] Yes  [] No    PLAN: Continue to focus on knee extension ROM and hip/quad strength.    [x] Continue per plan of care [] Alter current plan (see comments)   [] Plan of care initiated [] Hold pending MD visit [] Discharge    Electronically signed by: Lexy Garcia PT

## 2018-03-01 NOTE — TELEPHONE ENCOUNTER
Pt is calling about her [place] [facility] --they do not want to pay for it. Please  Do a PA --she uses [employer]      Please call pt so she knows we did this.

## 2018-03-07 ENCOUNTER — HOSPITAL ENCOUNTER (OUTPATIENT)
Dept: PHYSICAL THERAPY | Age: 68
Discharge: HOME | End: 2018-03-08
Admitting: ORTHOPAEDIC SURGERY

## 2018-03-07 NOTE — FLOWSHEET NOTE
2'      Ankle mobs       Hamstring stretch  30\" 3           NMR re-education       [place]/Biofeedback 10/10       G. Med activaiton/sidelying       G. Max Activation/prone       Hip Ext full ROM G. Activation       Bosu Bal and Prop- G Med       Single leg stance/Balance/Prop       Bosu Retro G. Med act       Tandem balance  30\" 3 airmat NPV? Therapeutic Exercise and NMR EXR  [x] (55244) Provided verbal/tactile cueing for activities related to strengthening, flexibility, endurance, ROM for improvements in LE, proximal hip, and core control with self care, mobility, lifting, ambulation.  [] (83574) Provided verbal/tactile cueing for activities related to improving balance, coordination, kinesthetic sense, posture, motor skill, proprioception  to assist with LE, proximal hip, and core control in self care, mobility, lifting, ambulation and eccentric single leg control.      NMR and Therapeutic Activities:    [] (92985 or 83710) Provided verbal/tactile cueing for activities related to improving balance, coordination, kinesthetic sense, posture, motor skill, proprioception and motor activation to allow for proper function of core, proximal hip and LE with self care and ADLs  [] (34128) Gait Re-education- Provided training and instruction to the patient for proper LE, core and proximal hip recruitment and positioning and eccentric body weight control with ambulation re-education including up and down stairs     Home Exercise Program:    [x] (57357) Reviewed/Progressed HEP activities related to strengthening, flexibility, endurance, ROM of core, proximal hip and LE for functional self-care, mobility, lifting and ambulation/stair navigation   [] (76326)Reviewed/Progressed HEP activities related to improving balance, coordination, kinesthetic sense, posture, motor skill, proprioception of core, proximal hip and LE for self care, mobility, lifting, and ambulation/stair navigation      Manual Treatments:  PROM /

## 2018-03-14 ENCOUNTER — HOSPITAL ENCOUNTER (OUTPATIENT)
Dept: PHYSICAL THERAPY | Age: 68
Discharge: HOME | End: 2018-03-15
Admitting: ORTHOPAEDIC SURGERY

## 2018-03-14 NOTE — FLOWSHEET NOTE
[address], [facility]    Physical Therapy Daily Treatment Note  Date:  3/14/2018    Patient Name:  Samuel Iglesias    :  1950  MRN: [id number]  Restrictions/Precautions:    Medical/Treatment Diagnosis Information:  · Diagnosis: Patellofemoral arthritis (M17.10)  · Treatment Diagnosis: Left knee pain   Insurance/Certification information:  PT Insurance Information: Medicare - PT visits based on [place]  Physician Information:  Referring Practitioner: Dr. Driss Chiu of care signed (Y/N):     Date of Patient follow up with Physician:     G-Code (if applicable):      Date G-Code Applied:         Progress Note: [x]  Yes  []  No  Next due by: Visit #10       Latex Allergy:  [x]NO      []YES  Preferred Language for Healthcare:   [x]English       []other:    Visit # Insurance Allowable   4 [place]     Pain level:  0/10     SUBJECTIVE:  Pt reports that her feels about 90% back to PLOF. Pt states that overall, it has felt really good, but she has not attempted kneeling on it recently. Pt states that she is now able to walk up and down steps with reciprocal pattern, but still takes her time with stairs.           OBJECTIVE:  Observation:   Test measurements:    3/1/18: Knee ext AROM = lacking 7  3/7/18: Knee ext AROM = lacking 5  3/14/18:  LEFS = 16% impaired   ROM LEFT RIGHT   Knee ext  lacking 5 Lacking 10   Knee Flex 130 125   Strength  LEFT RIGHT   HIP Flexors 4+ 4+   HIP Abductors 4 4+   Knee EXT (quad) 5 5   Knee Flex (HS) 5 5   Ankle DF 5 5         RESTRICTIONS/PRECAUTIONS: L knee PF arthritis, small lateral meniscus tear; chronic R knee pain after patellar fx in      Exercises/Interventions:     Therapeutic Ex Resistance Sets/sec Reps Notes   Bike 6       SLR flex/abd 2# 2 15    SAQ 2# 2 15    Clam ABD teal 2 15    Bridges  2 10    Bosu fwd/side lunge       Slide Lunge       Leg Press  70# 1 25    Cybex HS curl       TKE blue 5\" 20    Glute side walks teal 1 2 laps    BOSU squat       Incline calf stretch  30\" 3    Hamstring stretch EOB  Hold due to LBP   Quad sets w/towel  5\" 20 HEP   Standing HR  3 10    Mini squats  2 10    LSD 2\" 2 10           Manual Intervention       Knee mobs/PROM 10'   ext   Tib/Fem Mobs       Patella Mobs 2'      Ankle mobs       Hamstring stretch  30\" 3           NMR re-education       [place]/Biofeedback 10/10       G. Med activaiton/sidelying       G. Max Activation/prone       Hip Ext full ROM G. Activation       Bosu Bal and Prop- G Med       Single leg stance/Balance/Prop  30\" 3    Bosu Retro G. Med act       Tandem balance airmat 30\" 3               Therapeutic Exercise and NMR EXR  [x] (38330) Provided verbal/tactile cueing for activities related to strengthening, flexibility, endurance, ROM for improvements in LE, proximal hip, and core control with self care, mobility, lifting, ambulation.  [] (74039) Provided verbal/tactile cueing for activities related to improving balance, coordination, kinesthetic sense, posture, motor skill, proprioception  to assist with LE, proximal hip, and core control in self care, mobility, lifting, ambulation and eccentric single leg control.      NMR and Therapeutic Activities:    [] (35032 or 06399) Provided verbal/tactile cueing for activities related to improving balance, coordination, kinesthetic sense, posture, motor skill, proprioception and motor activation to allow for proper function of core, proximal hip and LE with self care and ADLs  [] (42483) Gait Re-education- Provided training and instruction to the patient for proper LE, core and proximal hip recruitment and positioning and eccentric body weight control with ambulation re-education including up and down stairs     Home Exercise Program:    [x] (51982) Reviewed/Progressed HEP activities related to strengthening, flexibility, endurance, ROM of core, proximal hip and LE for functional self-care, mobility, lifting and ambulation/stair navigation   [] (96433)Reviewed/Progressed HEP activities related to improving balance, coordination, kinesthetic sense, posture, motor skill, proprioception of core, proximal hip and LE for self care, mobility, lifting, and ambulation/stair navigation      Manual Treatments:  PROM / STM / Oscillations-Mobs:  G-I, II, III, IV (PA's, Inf., Post.)  [x] (12272) Provided manual therapy to mobilize LE, proximal hip and/or LS spine soft tissue/joints for the purpose of modulating pain, promoting relaxation,  increasing ROM, reducing/eliminating soft tissue swelling/inflammation/restriction, improving soft tissue extensibility and allowing for proper ROM for normal function with self care, mobility, lifting and ambulation. Modalities:  Declined ice    Charges:  Timed Code Treatment Minutes: 50   Total Treatment Minutes: 50     [] EVAL  [x] BY(62740) x  2   [] IONTO  [] NMR (29632) x      [] VASO  [x] Manual (19304) x  1    [] Other:  [] TA x       [] Mech Traction (26995)  [] ES(attended) (25197)      [] ES (un) (72427):     GOALS:   Patient stated goal: \"To feel better and manage pain\"     Therapist goals for Patient:   Short Term Goals: To be achieved in: 2 weeks  1. Independent in HEP and progression per patient tolerance, in order to prevent re-injury. MET  2. Patient will have a decrease in pain to facilitate improvement in movement, function, and ADLs as indicated by Functional Deficits. MET     Long Term Goals: To be achieved in: 4-6 weeks  1. Disability index score of 40% or less for the LEFS to assist with reaching prior level of function. MET  2. Patient will demonstrate increased AROM to WNL for L knee to allow for proper joint functioning as indicated by patients Functional Deficits. MET  3.  Patient will demonstrate an increase in Strength to good proximal hip strength and control, within 5lb HHD in LE to allow for proper functional mobility as indicated by patients Functional

## 2018-04-01 ENCOUNTER — HOSPITAL ENCOUNTER (OUTPATIENT)
Dept: PHYSICAL THERAPY | Age: 68
Discharge: HOME | End: 2018-04-30
Attending: ORTHOPAEDIC SURGERY | Admitting: ORTHOPAEDIC SURGERY

## 2018-06-12 ENCOUNTER — OFFICE VISIT (OUTPATIENT)
Dept: FAMILY MEDICINE CLINIC | Age: 68
End: 2018-06-12

## 2018-06-12 VITALS
WEIGHT: 205 LBS | BODY MASS INDEX: 37.73 KG/M2 | HEIGHT: 62 IN | SYSTOLIC BLOOD PRESSURE: 118 MMHG | DIASTOLIC BLOOD PRESSURE: 82 MMHG

## 2018-06-12 DIAGNOSIS — G47.33 OSA ON CPAP: Primary | ICD-10-CM

## 2018-06-12 DIAGNOSIS — Z99.89 OSA ON CPAP: Primary | ICD-10-CM

## 2018-06-12 DIAGNOSIS — F17.210 CIGARETTE NICOTINE DEPENDENCE WITHOUT COMPLICATION: ICD-10-CM

## 2018-06-12 DIAGNOSIS — I10 ESSENTIAL HYPERTENSION: ICD-10-CM

## 2018-06-12 DIAGNOSIS — R53.83 FATIGUE, UNSPECIFIED TYPE: ICD-10-CM

## 2018-06-12 DIAGNOSIS — E03.9 ACQUIRED HYPOTHYROIDISM: ICD-10-CM

## 2018-06-12 PROCEDURE — G8400 PT W/DXA NO RESULTS DOC: HCPCS | Performed by: FAMILY MEDICINE

## 2018-06-12 PROCEDURE — 1090F PRES/ABSN URINE INCON ASSESS: CPT | Performed by: FAMILY MEDICINE

## 2018-06-12 PROCEDURE — 99214 OFFICE O/P EST MOD 30 MIN: CPT | Performed by: FAMILY MEDICINE

## 2018-06-12 PROCEDURE — 3017F COLORECTAL CA SCREEN DOC REV: CPT | Performed by: FAMILY MEDICINE

## 2018-06-12 PROCEDURE — 36415 COLL VENOUS BLD VENIPUNCTURE: CPT | Performed by: FAMILY MEDICINE

## 2018-06-12 PROCEDURE — 4040F PNEUMOC VAC/ADMIN/RCVD: CPT | Performed by: FAMILY MEDICINE

## 2018-06-12 PROCEDURE — 4004F PT TOBACCO SCREEN RCVD TLK: CPT | Performed by: FAMILY MEDICINE

## 2018-06-12 PROCEDURE — G8417 CALC BMI ABV UP PARAM F/U: HCPCS | Performed by: FAMILY MEDICINE

## 2018-06-12 PROCEDURE — G8427 DOCREV CUR MEDS BY ELIG CLIN: HCPCS | Performed by: FAMILY MEDICINE

## 2018-06-12 PROCEDURE — 1123F ACP DISCUSS/DSCN MKR DOCD: CPT | Performed by: FAMILY MEDICINE

## 2018-06-12 RX ORDER — ESTRADIOL 1 MG/1
.5-1 TABLET ORAL DAILY
Qty: 90 TABLET | Refills: 3 | Status: CANCELLED | OUTPATIENT
Start: 2018-06-12

## 2018-06-12 RX ORDER — LISINOPRIL AND HYDROCHLOROTHIAZIDE 25; 20 MG/1; MG/1
TABLET ORAL
Qty: 90 TABLET | Refills: 3 | Status: SHIPPED | OUTPATIENT
Start: 2018-06-12 | End: 2019-05-20 | Stop reason: SDUPTHER

## 2018-06-12 RX ORDER — BUPROPION HYDROCHLORIDE 150 MG/1
150 TABLET, EXTENDED RELEASE ORAL 2 TIMES DAILY
Qty: 60 TABLET | Refills: 3 | Status: ON HOLD | OUTPATIENT
Start: 2018-06-12 | End: 2019-04-19

## 2018-06-13 LAB
A/G RATIO: 1.4 (ref 1.1–2.2)
ALBUMIN SERPL-MCNC: 4.1 G/DL (ref 3.4–5)
ALP BLD-CCNC: 115 U/L (ref 40–129)
ALT SERPL-CCNC: 16 U/L (ref 10–40)
ANION GAP SERPL CALCULATED.3IONS-SCNC: 16 MMOL/L (ref 3–16)
AST SERPL-CCNC: 13 U/L (ref 15–37)
BASOPHILS ABSOLUTE: 0.1 K/UL (ref 0–0.2)
BASOPHILS RELATIVE PERCENT: 0.6 %
BILIRUB SERPL-MCNC: <0.2 MG/DL (ref 0–1)
BUN BLDV-MCNC: 18 MG/DL (ref 7–20)
CALCIUM SERPL-MCNC: 10.1 MG/DL (ref 8.3–10.6)
CHLORIDE BLD-SCNC: 102 MMOL/L (ref 99–110)
CO2: 26 MMOL/L (ref 21–32)
CREAT SERPL-MCNC: 0.5 MG/DL (ref 0.6–1.2)
EOSINOPHILS ABSOLUTE: 0.2 K/UL (ref 0–0.6)
EOSINOPHILS RELATIVE PERCENT: 2.2 %
GFR AFRICAN AMERICAN: >60
GFR NON-AFRICAN AMERICAN: >60
GLOBULIN: 2.9 G/DL
GLUCOSE BLD-MCNC: 110 MG/DL (ref 70–99)
HCT VFR BLD CALC: 40.2 % (ref 36–48)
HEMOGLOBIN: 13.5 G/DL (ref 12–16)
LYMPHOCYTES ABSOLUTE: 2.7 K/UL (ref 1–5.1)
LYMPHOCYTES RELATIVE PERCENT: 27.1 %
MCH RBC QN AUTO: 29.3 PG (ref 26–34)
MCHC RBC AUTO-ENTMCNC: 33.7 G/DL (ref 31–36)
MCV RBC AUTO: 87 FL (ref 80–100)
MONOCYTES ABSOLUTE: 0.6 K/UL (ref 0–1.3)
MONOCYTES RELATIVE PERCENT: 6.5 %
NEUTROPHILS ABSOLUTE: 6.3 K/UL (ref 1.7–7.7)
NEUTROPHILS RELATIVE PERCENT: 63.6 %
PDW BLD-RTO: 14.2 % (ref 12.4–15.4)
PLATELET # BLD: 309 K/UL (ref 135–450)
PMV BLD AUTO: 9 FL (ref 5–10.5)
POTASSIUM SERPL-SCNC: 4.4 MMOL/L (ref 3.5–5.1)
RBC # BLD: 4.62 M/UL (ref 4–5.2)
SODIUM BLD-SCNC: 144 MMOL/L (ref 136–145)
T3 TOTAL: 1.98 NG/ML (ref 0.8–2)
T4 FREE: 1 NG/DL (ref 0.9–1.8)
TOTAL PROTEIN: 7 G/DL (ref 6.4–8.2)
TSH REFLEX: 0.02 UIU/ML (ref 0.27–4.2)
WBC # BLD: 9.9 K/UL (ref 4–11)

## 2018-10-08 ENCOUNTER — TELEPHONE (OUTPATIENT)
Dept: FAMILY MEDICINE CLINIC | Age: 68
End: 2018-10-08

## 2018-10-08 RX ORDER — OMEPRAZOLE 40 MG/1
40 CAPSULE, DELAYED RELEASE ORAL DAILY
Qty: 90 CAPSULE | Refills: 3 | Status: SHIPPED | OUTPATIENT
Start: 2018-10-08 | End: 2018-10-08 | Stop reason: SDUPTHER

## 2018-10-08 RX ORDER — LEVOTHYROXINE AND LIOTHYRONINE 76; 18 UG/1; UG/1
TABLET ORAL
Qty: 10 TABLET | Refills: 0 | Status: SHIPPED | OUTPATIENT
Start: 2018-10-08 | End: 2019-11-01 | Stop reason: SDUPTHER

## 2018-10-08 RX ORDER — LEVOTHYROXINE AND LIOTHYRONINE 76; 18 UG/1; UG/1
TABLET ORAL
Qty: 90 TABLET | Refills: 3 | Status: SHIPPED | OUTPATIENT
Start: 2018-10-08 | End: 2018-10-08 | Stop reason: SDUPTHER

## 2018-10-08 RX ORDER — ESTRADIOL 1 MG/1
.5-1 TABLET ORAL DAILY
Qty: 10 TABLET | Refills: 0 | Status: SHIPPED | OUTPATIENT
Start: 2018-10-08 | End: 2019-11-01 | Stop reason: SDUPTHER

## 2018-10-08 RX ORDER — OMEPRAZOLE 40 MG/1
40 CAPSULE, DELAYED RELEASE ORAL DAILY
Qty: 10 CAPSULE | Refills: 0 | Status: SHIPPED | OUTPATIENT
Start: 2018-10-08 | End: 2019-12-05 | Stop reason: SDUPTHER

## 2018-10-08 RX ORDER — THYROID, PORCINE 120 MG/1
TABLET ORAL
Qty: 90 TABLET | Refills: 0 | Status: ON HOLD | OUTPATIENT
Start: 2018-10-08 | End: 2019-04-19

## 2018-10-08 RX ORDER — ESTRADIOL 1 MG/1
.5-1 TABLET ORAL DAILY
Qty: 90 TABLET | Refills: 3 | Status: SHIPPED | OUTPATIENT
Start: 2018-10-08 | End: 2018-10-08 | Stop reason: SDUPTHER

## 2018-10-15 ENCOUNTER — TELEPHONE (OUTPATIENT)
Dept: FAMILY MEDICINE CLINIC | Age: 68
End: 2018-10-15

## 2019-03-05 ENCOUNTER — OFFICE VISIT (OUTPATIENT)
Dept: ORTHOPEDIC SURGERY | Age: 69
End: 2019-03-05
Payer: MEDICARE

## 2019-03-05 VITALS
HEART RATE: 82 BPM | SYSTOLIC BLOOD PRESSURE: 143 MMHG | BODY MASS INDEX: 37.73 KG/M2 | HEIGHT: 62 IN | WEIGHT: 205.03 LBS | DIASTOLIC BLOOD PRESSURE: 71 MMHG

## 2019-03-05 DIAGNOSIS — M51.26 LUMBAR DISC HERNIATION: Primary | ICD-10-CM

## 2019-03-05 DIAGNOSIS — M51.36 DISC DEGENERATION, LUMBAR: ICD-10-CM

## 2019-03-05 DIAGNOSIS — M54.50 PAIN OF LUMBAR SPINE: ICD-10-CM

## 2019-03-05 PROCEDURE — 4004F PT TOBACCO SCREEN RCVD TLK: CPT | Performed by: PHYSICAL MEDICINE & REHABILITATION

## 2019-03-05 PROCEDURE — G8483 FLU IMM NO ADMIN DOC REA: HCPCS | Performed by: PHYSICAL MEDICINE & REHABILITATION

## 2019-03-05 PROCEDURE — G8417 CALC BMI ABV UP PARAM F/U: HCPCS | Performed by: PHYSICAL MEDICINE & REHABILITATION

## 2019-03-05 PROCEDURE — 1101F PT FALLS ASSESS-DOCD LE1/YR: CPT | Performed by: PHYSICAL MEDICINE & REHABILITATION

## 2019-03-05 PROCEDURE — G8427 DOCREV CUR MEDS BY ELIG CLIN: HCPCS | Performed by: PHYSICAL MEDICINE & REHABILITATION

## 2019-03-05 PROCEDURE — 1123F ACP DISCUSS/DSCN MKR DOCD: CPT | Performed by: PHYSICAL MEDICINE & REHABILITATION

## 2019-03-05 PROCEDURE — 99203 OFFICE O/P NEW LOW 30 MIN: CPT | Performed by: PHYSICAL MEDICINE & REHABILITATION

## 2019-03-05 PROCEDURE — 1090F PRES/ABSN URINE INCON ASSESS: CPT | Performed by: PHYSICAL MEDICINE & REHABILITATION

## 2019-03-05 PROCEDURE — 3017F COLORECTAL CA SCREEN DOC REV: CPT | Performed by: PHYSICAL MEDICINE & REHABILITATION

## 2019-03-05 PROCEDURE — 4040F PNEUMOC VAC/ADMIN/RCVD: CPT | Performed by: PHYSICAL MEDICINE & REHABILITATION

## 2019-03-05 PROCEDURE — G8400 PT W/DXA NO RESULTS DOC: HCPCS | Performed by: PHYSICAL MEDICINE & REHABILITATION

## 2019-03-05 RX ORDER — TIZANIDINE 4 MG/1
4 TABLET ORAL NIGHTLY PRN
Qty: 30 TABLET | Refills: 0 | Status: SHIPPED | OUTPATIENT
Start: 2019-03-05 | End: 2019-04-05 | Stop reason: SDUPTHER

## 2019-03-05 RX ORDER — HYDROCODONE BITARTRATE AND ACETAMINOPHEN 5; 300 MG/1; MG/1
TABLET ORAL EVERY 6 HOURS PRN
Status: ON HOLD | COMMUNITY
End: 2019-04-19

## 2019-03-05 RX ORDER — METHYLPREDNISOLONE 4 MG/1
TABLET ORAL
Qty: 1 KIT | Refills: 0 | Status: ON HOLD | OUTPATIENT
Start: 2019-03-05 | End: 2019-04-19

## 2019-03-07 ENCOUNTER — TELEPHONE (OUTPATIENT)
Dept: ORTHOPEDIC SURGERY | Age: 69
End: 2019-03-07

## 2019-03-07 DIAGNOSIS — M51.26 LUMBAR DISC HERNIATION: Primary | ICD-10-CM

## 2019-03-07 DIAGNOSIS — M51.36 DISC DEGENERATION, LUMBAR: ICD-10-CM

## 2019-03-07 DIAGNOSIS — M54.50 PAIN OF LUMBAR SPINE: ICD-10-CM

## 2019-03-12 ENCOUNTER — HOSPITAL ENCOUNTER (OUTPATIENT)
Dept: MRI IMAGING | Age: 69
Discharge: HOME | End: 2019-03-12
Payer: MEDICARE

## 2019-03-12 DIAGNOSIS — M51.36 DISC DEGENERATION, LUMBAR: ICD-10-CM

## 2019-03-12 DIAGNOSIS — M51.26 LUMBAR DISC HERNIATION: ICD-10-CM

## 2019-03-12 DIAGNOSIS — M54.50 PAIN OF LUMBAR SPINE: ICD-10-CM

## 2019-03-12 PROCEDURE — 72148 MRI LUMBAR SPINE W/O DYE: CPT

## 2019-03-15 ENCOUNTER — OFFICE VISIT (OUTPATIENT)
Dept: ORTHOPEDIC SURGERY | Age: 69
End: 2019-03-15
Payer: MEDICARE

## 2019-03-15 VITALS — WEIGHT: 205.03 LBS | HEIGHT: 62 IN | BODY MASS INDEX: 37.73 KG/M2

## 2019-03-15 DIAGNOSIS — M43.16 SPONDYLOLISTHESIS OF LUMBAR REGION: Primary | ICD-10-CM

## 2019-03-15 DIAGNOSIS — M51.36 LUMBAR DEGENERATIVE DISC DISEASE: ICD-10-CM

## 2019-03-15 DIAGNOSIS — M47.816 SPONDYLOSIS OF LUMBAR REGION WITHOUT MYELOPATHY OR RADICULOPATHY: ICD-10-CM

## 2019-03-15 DIAGNOSIS — M70.62 GREATER TROCHANTERIC BURSITIS OF LEFT HIP: ICD-10-CM

## 2019-03-15 PROCEDURE — 99214 OFFICE O/P EST MOD 30 MIN: CPT | Performed by: PHYSICAL MEDICINE & REHABILITATION

## 2019-03-15 PROCEDURE — 1090F PRES/ABSN URINE INCON ASSESS: CPT | Performed by: PHYSICAL MEDICINE & REHABILITATION

## 2019-03-15 PROCEDURE — 1123F ACP DISCUSS/DSCN MKR DOCD: CPT | Performed by: PHYSICAL MEDICINE & REHABILITATION

## 2019-03-15 PROCEDURE — 1101F PT FALLS ASSESS-DOCD LE1/YR: CPT | Performed by: PHYSICAL MEDICINE & REHABILITATION

## 2019-03-15 PROCEDURE — G8400 PT W/DXA NO RESULTS DOC: HCPCS | Performed by: PHYSICAL MEDICINE & REHABILITATION

## 2019-03-15 PROCEDURE — G8417 CALC BMI ABV UP PARAM F/U: HCPCS | Performed by: PHYSICAL MEDICINE & REHABILITATION

## 2019-03-15 PROCEDURE — 4004F PT TOBACCO SCREEN RCVD TLK: CPT | Performed by: PHYSICAL MEDICINE & REHABILITATION

## 2019-03-15 PROCEDURE — G8483 FLU IMM NO ADMIN DOC REA: HCPCS | Performed by: PHYSICAL MEDICINE & REHABILITATION

## 2019-03-15 PROCEDURE — 4040F PNEUMOC VAC/ADMIN/RCVD: CPT | Performed by: PHYSICAL MEDICINE & REHABILITATION

## 2019-03-15 PROCEDURE — G8427 DOCREV CUR MEDS BY ELIG CLIN: HCPCS | Performed by: PHYSICAL MEDICINE & REHABILITATION

## 2019-03-15 PROCEDURE — 3017F COLORECTAL CA SCREEN DOC REV: CPT | Performed by: PHYSICAL MEDICINE & REHABILITATION

## 2019-03-22 ENCOUNTER — TELEPHONE (OUTPATIENT)
Dept: ORTHOPEDIC SURGERY | Age: 69
End: 2019-03-22

## 2019-03-27 ENCOUNTER — TELEPHONE (OUTPATIENT)
Dept: ORTHOPEDIC SURGERY | Age: 69
End: 2019-03-27

## 2019-04-04 NOTE — FLOWSHEET NOTE
Physical Therapy  Cancellation/No-show Note  Patient Name:  Coleman Howard  :  1950   Date:  2019  Cancelled visits to date: 0  No-shows to date: 0    For today's appointment patient:  [x]  Cancelled  []  Rescheduled appointment  []  No-show     Reason given by patient:  []  Patient ill  []  Conflicting appointment  []  No transportation    []  Conflict with work  []  No reason given  [x]  Other:     Comments: pt called today, 19, to cancel her PT initial evaluation for tomorrow, 19, as she is out of town with a family emergency. Pt stated she did not receive a shot yet and will reschedule PT evaluation for after she has a shot.      Electronically signed by:  Nelson Ames PT

## 2019-04-05 ENCOUNTER — HOSPITAL ENCOUNTER (OUTPATIENT)
Dept: PHYSICAL THERAPY | Age: 69
Setting detail: THERAPIES SERIES
Discharge: HOME | End: 2019-04-05
Payer: MEDICARE

## 2019-04-08 RX ORDER — TIZANIDINE 4 MG/1
4 TABLET ORAL NIGHTLY PRN
Qty: 30 TABLET | Refills: 0 | Status: SHIPPED | OUTPATIENT
Start: 2019-04-08 | End: 2020-04-23 | Stop reason: SDUPTHER

## 2019-04-10 ENCOUNTER — TELEPHONE (OUTPATIENT)
Dept: ORTHOPEDIC SURGERY | Age: 69
End: 2019-04-10

## 2019-04-12 NOTE — PROGRESS NOTES
PATIENT REACHED   YES____NO_X___    PREOP INSTUCTIONS LEFT ON VM FLXCMY__[phone]_____________      DATE_4/19/19________ TIME_0810________ARRIVAL_0710_______PLACE__masc__________  NOTHING TO EAT OR DRINK  6 HOURS PRIOR TO PROCEDURE START TIME  YOU NEED A RESPONSIBLE ADULT AGE 18 OR OLDER TO DRIVE YOU HOME  PLEASE BRING INSURANCE CARD. PICTURE ID AND COMPLETE LIST OF MEDS  WEAR LOOSE COMFORTABLE CLOTHING  FOLLOW ANY INSTRUCTIONS YOUR DRS OFFICE HAS GIVEN YOU,INCLUDING WHAT MEDICATIONS TO TAKE THE AM OF PROCEDURE AND WHEN AND IF YOU NEED TO STOP ANY BLOOD THINNERS. IF YOU HAVE QUESTIONS REGARDING THIS CALL THE OFFICE  THE GOAL BLOOD SUGAR THE AM OF PROCEDURE  OR LESS ABOVE THAT THE PROCEDURE MAY BE CANCELLED  ANY QUESTIONS CALL YOUR DOCTOR. ALSO,PLEASE READ THE INSTRUCTION PACKET FROM YOUR DR IF YOU RECEIVED ONE.   SPINE INTERVENTION NUMBER [phone]

## 2019-04-19 ENCOUNTER — HOSPITAL ENCOUNTER (OUTPATIENT)
Age: 69
Setting detail: OUTPATIENT SURGERY
Discharge: HOME | End: 2019-04-19
Attending: PHYSICAL MEDICINE & REHABILITATION | Admitting: PHYSICAL MEDICINE & REHABILITATION
Payer: MEDICARE

## 2019-04-19 ENCOUNTER — APPOINTMENT (OUTPATIENT)
Dept: GENERAL RADIOLOGY | Age: 69
End: 2019-04-19
Attending: PHYSICAL MEDICINE & REHABILITATION
Payer: MEDICARE

## 2019-04-19 VITALS
BODY MASS INDEX: 35.51 KG/M2 | SYSTOLIC BLOOD PRESSURE: 111 MMHG | WEIGHT: 193 LBS | HEIGHT: 62 IN | OXYGEN SATURATION: 98 % | RESPIRATION RATE: 16 BRPM | DIASTOLIC BLOOD PRESSURE: 71 MMHG | TEMPERATURE: 97 F | HEART RATE: 73 BPM

## 2019-04-19 PROCEDURE — 2500000003 HC RX 250 WO HCPCS: Performed by: PHYSICAL MEDICINE & REHABILITATION

## 2019-04-19 PROCEDURE — 77003 FLUOROGUIDE FOR SPINE INJECT: CPT

## 2019-04-19 PROCEDURE — 2709999900 HC NON-CHARGEABLE SUPPLY: Performed by: PHYSICAL MEDICINE & REHABILITATION

## 2019-04-19 PROCEDURE — 99152 MOD SED SAME PHYS/QHP 5/>YRS: CPT | Performed by: PHYSICAL MEDICINE & REHABILITATION

## 2019-04-19 PROCEDURE — 6360000002 HC RX W HCPCS: Performed by: PHYSICAL MEDICINE & REHABILITATION

## 2019-04-19 PROCEDURE — 3610000054 HC PAIN LEVEL 3 BASE (NON-OR): Performed by: PHYSICAL MEDICINE & REHABILITATION

## 2019-04-19 RX ORDER — MIDAZOLAM HYDROCHLORIDE 1 MG/ML
INJECTION INTRAMUSCULAR; INTRAVENOUS
Status: COMPLETED | OUTPATIENT
Start: 2019-04-19 | End: 2019-04-19

## 2019-04-19 RX ORDER — LIDOCAINE HYDROCHLORIDE 10 MG/ML
INJECTION, SOLUTION INFILTRATION; PERINEURAL
Status: COMPLETED | OUTPATIENT
Start: 2019-04-19 | End: 2019-04-19

## 2019-04-19 RX ORDER — BUPIVACAINE HYDROCHLORIDE 5 MG/ML
INJECTION, SOLUTION PERINEURAL
Status: COMPLETED | OUTPATIENT
Start: 2019-04-19 | End: 2019-04-19

## 2019-04-19 RX ORDER — METHYLPREDNISOLONE ACETATE 80 MG/ML
INJECTION, SUSPENSION INTRA-ARTICULAR; INTRALESIONAL; INTRAMUSCULAR; SOFT TISSUE
Status: COMPLETED | OUTPATIENT
Start: 2019-04-19 | End: 2019-04-19

## 2019-04-19 ASSESSMENT — PAIN SCALES - GENERAL: PAINLEVEL_OUTOF10: 0

## 2019-04-19 ASSESSMENT — PAIN - FUNCTIONAL ASSESSMENT: PAIN_FUNCTIONAL_ASSESSMENT: 0-10

## 2019-04-19 NOTE — PROGRESS NOTES
IV discontinued, catheter intact, and dressing applied. Procedural dressing dry and intact. Bilateral lower extremities equal in strength. Discharge instructions reviewed with patient or responsible adult, signed and copy given. All home medications have been reviewed. All questions answered and patient or responsible adult verbalized understanding.   PAIN LEVEL AT DISCHARGE _____0

## 2019-04-19 NOTE — H&P
HISTORY AND PHYSICAL/PRE-SEDATION ASSESSMENT    Patient:  Corina Mccallum   :  1950  Medical Record No.:  [id number]   Date:  2019  Physician:  Machelle Oviedo M.D. Facility: [address] [place] [address]    HISTORY OF PRESENT ILLNESS:                 The patient is a 77 y.o. female whom presents with lower back pain. Review of the imaging and physical exam of the patient confirmed the pre-procedure diagnosis. After a thorough discussion of risks, benefits and alternatives informed consent was obtained. Past Medical History:   Past Medical History:   Diagnosis Date    Delayed gastric emptying 3/27/2014    Hayfever     Hypertension     Hypothyroid 2011    Right knee pain 2013      Past Surgical History:     Past Surgical History:   Procedure Laterality Date    ADENOIDECTOMY      AS A CHILD    COLONOSCOPY      normal    HYSTERECTOMY      bilat ovaries    TONSILLECTOMY      AS CHILD    WISDOM TOOTH EXTRACTION       Current Medications:   Prior to Admission medications    Medication Sig Start Date End Date Taking?  Authorizing Provider   [facility] THYROID) 120 MG tablet TAKE 1 TAB BY MOUTH DAILY 10/8/18  Yes PRAMOD Stiles CNP   estradiol (ESTRACE) 1 MG tablet Take 0.5-1 tablets by mouth daily 10/8/18  Yes PRAMOD Stiles CNP   omeprazole (PRILOSEC) 40 MG delayed release capsule Take 1 capsule by mouth daily 10/8/18  Yes PRAMOD Stiles CNP   lisinopril-hydrochlorothiazide (PRINZIDE;ZESTORETIC) 20-25 MG per tablet TAKE 1 TABLET BY MOUTH EVERY DAY 18  Yes Oscar Cleveland MD   tiZANidine (ZANAFLEX) 4 MG tablet TAKE 1 TABLET BY MOUTH NIGHTLY AS NEEDED (SPASMS) 19   Machelle Oviedo MD   meloxicam (MOBIC) 15 MG tablet Take 1 tablet by mouth daily 18   Alvarez Mckay MD   Cholecalciferol (VITAMIN D3) 2000 UNITS TABS Take 2 tablets by mouth daily    Historical Provider, MD   Multiple Vitamins-Minerals (MULTIVITAMIN PO) Take 1 tablet by mouth daily    Historical Provider, MD   Omega-3 Fatty Acids (FISH OIL) 1000 MG CAPS Take 3,000 mg by mouth daily    Historical Provider, MD   calcipotriene (DOVONEX) 0.005 % cream Apply topically 2 times daily. 6/10/15   Bereket Brown MD   POTASSIUM PO Take 1 tablet by mouth daily. Historical Provider, MD     Allergies:  Codeine and Prednisone  Social History:    reports that she has been smoking. She has a 22.00 pack-year smoking history. She has never used smokeless tobacco. She reports that she does not drink alcohol or use drugs. Family History:   Family History   Problem Relation Age of Onset    Dementia Mother     Mental Illness Mother     High Blood Pressure Mother     Heart Disease Father     High Blood Pressure Father     Stroke Maternal Grandmother     Heart Disease Other         dad's family       Vitals: Blood pressure (!) 148/72, pulse 73, temperature 97 °F (36.1 °C), resp. rate 16, height 5' 2\" (1.575 m), weight 193 lb (87.5 kg), SpO2 97 %, not currently breastfeeding. PHYSICAL EXAM:including affected areas  HENT: Airway patent and reviewed  Cardiovascular: Normal rate, regular rhythm, normal heart sounds. Pulmonary/Chest: No wheezes. No rhonchi. No rales. Abdominal: Soft. Bowel sounds are normal. No distension. Extremities: Moves all extremities equally  Cervical and Lumbar Spine: Painful range of motion, no midline tenderness       Diagnosis:Lumbar radiculopathy  M43.16   M51.36   M47.816   M70.62    Plan: Proceed with planned procedure      ASA CLASS:         []   I. Normal, healthy adult           [x]   II.  Mild systemic disease            []   III. Severe systemic disease      Mallampati: Mallampati Class II - (soft palate, fauces & uvula are visible)      Sedation plan:   [x]  Local              []  Minimal                  []  General anesthesia    Patient's condition acceptable for planned procedure/sedation.    Post Procedure Plan   Return to same

## 2019-04-19 NOTE — OP NOTE
Patient:  Paul Prasad  YOB: 1950  Medical Record #:  [id number]   Place:   [address] [place] [address]  Date:  4/19/2019   Physician:  Alvin Shoemaker MD, LEYDA    Procedure:  Lumbar Epidural Steroid Injection - Interlaminar approach  L4 - L5    Pre-Procedure Diagnosis: Lumbar radiculopathy    Post-Procedure Diagnosis: Same    Sedation: Local with 1% Lidocaine 3 ml and 2 mg of IV Versed    EBL: None    Complications: None    Procedure Summary:    The patient was brought to the procedure suite and placed in the prone position. The skin overlying the lumbar spine was prepped and draped in the usual sterile fashion. Using fluoroscopic guidance, the L4 - L5 interlaminar space was identified. Through anesthetized skin a 20 gauge Touhy needle was advanced into the epidural space using continuous loss of resistance to saline technique. Isovue M300 was instilled and an epidurogram was noted without evidence of intrathecal or vascular spread. 10 ml of a solution containing preservative free normal saline and 80 mg of Depomedrol was instilled. The needle was removed and a band-aid applied. The patient was transferred to the post-operative area in stable condition.

## 2019-05-10 ENCOUNTER — OFFICE VISIT (OUTPATIENT)
Dept: ORTHOPEDIC SURGERY | Age: 69
End: 2019-05-10
Payer: MEDICARE

## 2019-05-10 VITALS
SYSTOLIC BLOOD PRESSURE: 128 MMHG | HEIGHT: 62 IN | DIASTOLIC BLOOD PRESSURE: 82 MMHG | HEART RATE: 86 BPM | WEIGHT: 192.9 LBS | BODY MASS INDEX: 35.5 KG/M2

## 2019-05-10 DIAGNOSIS — M70.62 GREATER TROCHANTERIC BURSITIS, LEFT: ICD-10-CM

## 2019-05-10 DIAGNOSIS — M51.36 LUMBAR DEGENERATIVE DISC DISEASE: ICD-10-CM

## 2019-05-10 DIAGNOSIS — M47.816 SPONDYLOSIS OF LUMBAR REGION WITHOUT MYELOPATHY OR RADICULOPATHY: Primary | ICD-10-CM

## 2019-05-10 PROCEDURE — G8427 DOCREV CUR MEDS BY ELIG CLIN: HCPCS | Performed by: PHYSICAL MEDICINE & REHABILITATION

## 2019-05-10 PROCEDURE — G8417 CALC BMI ABV UP PARAM F/U: HCPCS | Performed by: PHYSICAL MEDICINE & REHABILITATION

## 2019-05-10 PROCEDURE — G8400 PT W/DXA NO RESULTS DOC: HCPCS | Performed by: PHYSICAL MEDICINE & REHABILITATION

## 2019-05-10 PROCEDURE — 4040F PNEUMOC VAC/ADMIN/RCVD: CPT | Performed by: PHYSICAL MEDICINE & REHABILITATION

## 2019-05-10 PROCEDURE — 20611 DRAIN/INJ JOINT/BURSA W/US: CPT | Performed by: PHYSICAL MEDICINE & REHABILITATION

## 2019-05-10 PROCEDURE — 3017F COLORECTAL CA SCREEN DOC REV: CPT | Performed by: PHYSICAL MEDICINE & REHABILITATION

## 2019-05-10 PROCEDURE — 1090F PRES/ABSN URINE INCON ASSESS: CPT | Performed by: PHYSICAL MEDICINE & REHABILITATION

## 2019-05-10 PROCEDURE — 4004F PT TOBACCO SCREEN RCVD TLK: CPT | Performed by: PHYSICAL MEDICINE & REHABILITATION

## 2019-05-10 PROCEDURE — 99213 OFFICE O/P EST LOW 20 MIN: CPT | Performed by: PHYSICAL MEDICINE & REHABILITATION

## 2019-05-10 PROCEDURE — 1123F ACP DISCUSS/DSCN MKR DOCD: CPT | Performed by: PHYSICAL MEDICINE & REHABILITATION

## 2019-05-10 NOTE — PROGRESS NOTES
5/10/19 10:15 AM         NDC: 5438-0787-38   Chung Sullivan    Lot Number: [id number]       Comments: LEFT HIP BURSA         NDC: 1728-0396-55   -   LIDOCAINE 1%    Lot Number: [id number]       Comments: LEFT HIP BURSA

## 2019-05-10 NOTE — PROGRESS NOTES
Follow up: Rogerio Duane  1950  [id number]      CHIEF COMPLAINT:    Chief Complaint   Patient presents with    Lower Back Pain     fua 4/19/2019 IL L4-L5. HISTORY OF PRESENT ILLNESS:  Ms. Claire Liang is a 77 y.o. female returns for a follow up visit for multiple medical problems. Her current presenting problems are   1. Spondylosis of lumbar region without myelopathy or radiculopathy    2. Lumbar degenerative disc disease    3. Greater trochanteric bursitis, left    . As per information/history obtained from the PADT(patient assessment and documentation tool) - She complains of pain in the lower back with radiation to the hips Left She rates the pain 2/10 and describes it as sharp, shooting. Pain is made worse by: bending. She denies side effects from the current pain regimen. Patient reports that since the last follow up visit the physical functioning is better, family/social relationships are better, mood is better and sleep patterns are better, and that the overall functioning is better. Patient denies neurological bowel or bladder. Patient underwent a lumbar BRIE interlaminar approach L4-L5 on 4/19/2019. She reports 100% improvement of her symptoms following the injection. Patient does have some mild discomfort after gardening when she has to bend forward for a prolonged period of time. She also wishes to discuss her left hip pain as well. The patient states she has completed her previous antibiotic. Her pain was better until the last 3-4 days when she started experiencing some shooting, sharp pains. She reports her symptoms very minor - she is not hurting that bad, but she is trying to get ahead of the pain. Patient denies any new injuries or triggers. She is not ambulating with any assistive devices in the office today.      Associated signs and symptoms:   Neurogenic bowel or bladder symptoms:  no   Perceived weakness:  no   Difficulty walking:  no              Past Medical She has never used smokeless tobacco. She reports that she does not drink alcohol or use drugs. Family History:   Family History   Problem Relation Age of Onset    Dementia Mother     Mental Illness Mother     High Blood Pressure Mother     Heart Disease Father     High Blood Pressure Father     Stroke Maternal Grandmother     Heart Disease Other         dad's family       REVIEW OF SYSTEMS:   CONSTITUTIONAL: Denies unexplained weight loss, fevers, chills or fatigue  NEUROLOGICAL: Denies unsteady gait or progressive weakness  MUSCULOSKELETAL: Denies joint swelling or redness  GI: Denies nausea, vomiting, diarrhea   : Denies bowel or bladder issues       PHYSICAL EXAM:    Vitals: Blood pressure 128/82, pulse 86, height 5' 2.01\" (1.575 m), weight 192 lb 14.4 oz (87.5 kg), not currently breastfeeding. GENERAL EXAM:  · General Apparence: Patient is adequately groomed with no evidence of malnutrition. · Psychiatric: Orientation: The patient is oriented to time, place and person. The patient's mood and affect are appropriate   · Vascular: Examination reveals no swelling and palpation reveals no tenderness in upper or lower extremities. Good capillary refill. · The lymphatic examination of the neck, axillae and groin reveals all areas to be without enlargement or induration  · Sensation is intact without deficit in the upper and lower extremities to light touch and pinprick  · Coordination of the upper and lower extremities are normal.  · RIGHT UPPER EXTREMITY:  Inspection/examination of the right upper extremity does not show any tenderness, deformity or injury. Range of motion is unremarkable and pain-free. There is no gross instability. There are no rashes, ulcerations or lesions. Strength and tone are normal. No atrophy or abnormal movements are noted. · LEFT UPPER EXTREMITY: Inspection/examination of the left upper extremity does not show any tenderness, deformity or injury.  Range of motion is unremarkable and pain-free. There is no gross instability. There are no rashes, ulcerations or lesions. Strength and tone are normal. No atrophy or abnormal movements are noted. LUMBAR/SACRAL EXAMINATION:  · Inspection: Local inspection shows no step-off or bruising. Lumbar alignment is normal. No instability is noted. · Palpation:   No evidence of tenderness at the midline. Lumbar paraspinal tenderness: No tenderness to palpation of the lumbar paraspinals  Bursal tenderness Left greater trochanteric tenderness  There is no paraspinal spasm. · Range of Motion: pain-free ROM  · Strength:   Strength testing is 5/5 in all muscle groups tested. · Special Tests:   Straight leg raise and crossed SLR negative. Skyler's testing is negative bilaterally. FADIR's testing is negative bilaterally. · Skin: There are no rashes, ulcerations or lesions. · Reflexes: Reflexes are symmetrically 2+ at the patellar and ankle tendons. Clonus absent bilaterally at the feet. · Gait & station: normal, patient ambulates without assistance  · Additional Examinations:  · RIGHT LOWER EXTREMITY: Inspection/examination of the right lower extremity does not show any tenderness, deformity or injury. Range of motion is normal and pain-free. There is no gross instability. There are no rashes, ulcerations or lesions. Strength and tone are normal. No atrophy or abnormal movements are noted. · LEFT LOWER EXTREMITY:  Inspection/examination of the left lower extremity does not show any tenderness, deformity or injury. Range of motion is normal and pain-free. There is no gross instability. There are no rashes, ulcerations or lesions. Strength and tone are normal. No atrophy or abnormal movements are noted. Diagnostic Testing:    No new diagnostics. Results for orders placed or performed in visit on 06/12/18   T3   Result Value Ref Range    T3, Total 1.98 0.80 - 2.00 ng/mL     Impression:       1.  Spondylosis of lumbar region without

## 2019-05-21 RX ORDER — LISINOPRIL AND HYDROCHLOROTHIAZIDE 25; 20 MG/1; MG/1
TABLET ORAL
Qty: 90 TABLET | Refills: 0 | Status: SHIPPED | OUTPATIENT
Start: 2019-05-21 | End: 2019-07-24 | Stop reason: SDUPTHER

## 2019-06-07 ENCOUNTER — OFFICE VISIT (OUTPATIENT)
Dept: ORTHOPEDIC SURGERY | Age: 69
End: 2019-06-07
Payer: MEDICARE

## 2019-06-07 VITALS — HEIGHT: 62 IN | RESPIRATION RATE: 12 BRPM | WEIGHT: 192.9 LBS | BODY MASS INDEX: 35.5 KG/M2

## 2019-06-07 DIAGNOSIS — M70.62 GREATER TROCHANTERIC BURSITIS, LEFT: Primary | ICD-10-CM

## 2019-06-07 DIAGNOSIS — M51.26 LUMBAR DISC HERNIATION: ICD-10-CM

## 2019-06-07 DIAGNOSIS — M51.36 LUMBAR DEGENERATIVE DISC DISEASE: ICD-10-CM

## 2019-06-07 DIAGNOSIS — M47.816 SPONDYLOSIS OF LUMBAR REGION WITHOUT MYELOPATHY OR RADICULOPATHY: ICD-10-CM

## 2019-06-07 PROCEDURE — G8400 PT W/DXA NO RESULTS DOC: HCPCS | Performed by: PHYSICIAN ASSISTANT

## 2019-06-07 PROCEDURE — G8417 CALC BMI ABV UP PARAM F/U: HCPCS | Performed by: PHYSICIAN ASSISTANT

## 2019-06-07 PROCEDURE — 1090F PRES/ABSN URINE INCON ASSESS: CPT | Performed by: PHYSICIAN ASSISTANT

## 2019-06-07 PROCEDURE — 4040F PNEUMOC VAC/ADMIN/RCVD: CPT | Performed by: PHYSICIAN ASSISTANT

## 2019-06-07 PROCEDURE — 3017F COLORECTAL CA SCREEN DOC REV: CPT | Performed by: PHYSICIAN ASSISTANT

## 2019-06-07 PROCEDURE — 4004F PT TOBACCO SCREEN RCVD TLK: CPT | Performed by: PHYSICIAN ASSISTANT

## 2019-06-07 PROCEDURE — 1123F ACP DISCUSS/DSCN MKR DOCD: CPT | Performed by: PHYSICIAN ASSISTANT

## 2019-06-07 PROCEDURE — G8427 DOCREV CUR MEDS BY ELIG CLIN: HCPCS | Performed by: PHYSICIAN ASSISTANT

## 2019-06-07 PROCEDURE — 99212 OFFICE O/P EST SF 10 MIN: CPT | Performed by: PHYSICIAN ASSISTANT

## 2019-06-07 NOTE — PROGRESS NOTES
Follow up: Zena Palmer  1950  [id number]         Chief Complaint   Patient presents with    Lower Back Pain     F/U inj         HISTORY OF PRESENT ILLNESS:  Ms. Merlinda Reges is a 77 y.o. female returns for a follow up visit for multiple medical problems. Her current presenting problems are   1. Greater trochanteric bursitis, left    2. Spondylosis of lumbar region without myelopathy or radiculopathy    3. Lumbar degenerative disc disease    4. Lumbar disc herniation    . As per information/history obtained from the PADT(patient assessment and documentation tool) - She complains of pain in the lower back with radiation to the N/A She rates the pain 0/10 and describes it as N/A. Pain is made worse by: nothing. She denies side effects from the current pain regimen. Patient reports that since the last follow up visit the physical functioning is better, family/social relationships are better, mood is better and sleep patterns are better, and that the overall functioning is better. Patient denies neurological bowel or bladder. Patient in office for lower back pain. She states that her pain is a 0/10. She states that she feels 100% better since her injection of the L4-5 IL on 4/19/19 and a left bursa injection on 5/10/19. She states that she is able to move around and do yard work. She states that both her back and hip feels better. She states that she can take Tylenol to relieve her pain if she has to. She describes that she has had two and a half years of pain and now she has none.      Associated signs and symptoms:   Neurogenic bowel or bladder symptoms:  no   Perceived weakness:  no   Difficulty walking:  no              Past Medical History:   Past Medical History:   Diagnosis Date    Delayed gastric emptying 3/27/2014    Hayfever     Hypertension     Hypothyroid 4/20/2011    Right knee pain 7/12/2013      Past Surgical History:     Past Surgical History:   Procedure Laterality Date    High Blood Pressure Father     Stroke Maternal Grandmother     Heart Disease Other         dad's family       REVIEW OF SYSTEMS:   CONSTITUTIONAL: Denies unexplained weight loss, fevers, chills or fatigue  NEUROLOGICAL: Denies unsteady gait or progressive weakness  MUSCULOSKELETAL: Denies joint swelling or redness  GI: Denies nausea, vomiting, diarrhea   : Denies bowel or bladder issues       PHYSICAL EXAM:    Vitals: Resp. rate 12, height 5' 2.01\" (1.575 m), weight 192 lb 14.4 oz (87.5 kg), not currently breastfeeding. GENERAL EXAM:  · General Apparence: Patient is adequately groomed with no evidence of malnutrition. · Psychiatric: Orientation: The patient is oriented to time, place and person. The patient's mood and affect are appropriate   · Vascular: Examination reveals no swelling and palpation reveals no tenderness in upper or lower extremities. Good capillary refill. · The lymphatic examination of the neck, axillae and groin reveals all areas to be without enlargement or induration  · Sensation is intact without deficit in the upper and lower extremities to light touch and pinprick  · Coordination of the upper and lower extremities are normal.  · RIGHT UPPER EXTREMITY:  Inspection/examination of the right upper extremity does not show any tenderness, deformity or injury. Range of motion is unremarkable and pain-free. There is no gross instability. There are no rashes, ulcerations or lesions. Strength and tone are normal. No atrophy or abnormal movements are noted. · LEFT UPPER EXTREMITY: Inspection/examination of the left upper extremity does not show any tenderness, deformity or injury. Range of motion is unremarkable and pain-free. There is no gross instability. There are no rashes, ulcerations or lesions. Strength and tone are normal. No atrophy or abnormal movements are noted. LUMBAR/SACRAL EXAMINATION:  · Inspection: Local inspection shows no step-off or bruising.   Lumbar alignment is normal. No instability is noted. · Palpation:   No evidence of tenderness at the midline. Lumbar paraspinal tenderness: No tenderness to palpation of the lumbar paraspinals  Bursal tenderness No tenderness bilaterally  There is no paraspinal spasm. · Range of Motion: pain-free ROM  · Strength:   Strength testing is 5/5 in all muscle groups tested. · Special Tests:   Straight leg raise and crossed SLR negative. · Skin: There are no rashes, ulcerations or lesions. · Reflexes: Reflexes are symmetrically 2+ at the patellar and ankle tendons. Clonus absent bilaterally at the feet. · Gait & station: normal, patient ambulates without assistance  · Additional Examinations:  · RIGHT LOWER EXTREMITY: Inspection/examination of the right lower extremity does not show any tenderness, deformity or injury. Range of motion is normal and pain-free. There is no gross instability. There are no rashes, ulcerations or lesions. Strength and tone are normal. No atrophy or abnormal movements are noted. · LEFT LOWER EXTREMITY:  Inspection/examination of the left lower extremity does not show any tenderness, deformity or injury. Range of motion is normal and pain-free. There is no gross instability. There are no rashes, ulcerations or lesions. Strength and tone are normal. No atrophy or abnormal movements are noted. Diagnostic Testing:    MR Lumbar spine shows  03/12/2019  Impression   Trace grade 1 L3 on L4 anterolisthesis.       Degenerative disc disease as described above.       Spinal canal narrowing, mild at L3-4.       Foraminal narrowing, minimal at bilateral L4-5 and bilateral L5-S1. Results for orders placed or performed in visit on 06/12/18   T3   Result Value Ref Range    T3, Total 1.98 0.80 - 2.00 ng/mL     Impression:       1. Greater trochanteric bursitis, left    2. Spondylosis of lumbar region without myelopathy or radiculopathy    3. Lumbar degenerative disc disease    4.  Lumbar disc herniation Plan:  Clinical Course: Above diagnoses are improving     I discussed the diagnosis and the treatment options with Liyah Delgado today. In Summary:  The various treatment options were outlined and discussed with Liyah Delgado including:  Conservative care options: physical therapy, ice, medications, bracing, and activity modification. The indications for therapeutic injections. The indications for additional imaging/laboratory studies. The indications for (possible future) interventions. After considering the various options discussed, Liyah Delgado elected to pursue a course of treatment that includes the followin. Medications:  No further recommendations for new medications. 2. PT:  Encouraged to continue with HEP. 3. Further studies:  No further studies. 4. Interventional:  No further interventions at this time. 5. Follow up:  As needed. Liyah Delgado was instructed to call the office if her symptoms worsen or if new symptoms appear prior to the next scheduled visit. She is specifically instructed to contact the office between now & her scheduled appointment if she has concerns related to her condition or if she needs assistance in scheduling the above tests. She is welcome to call for an appointment sooner if she has any additional concerns or questions. Shelli Hendrix CRMA, am scribing for and in the presence of Karon Peterson  19 11:08 AM Nina Murray CRMA. Allie Daniel PA-C, personally performed the services described in this documentation as scribed by Nina Mruray CRMA in my presence and it is both accurate and complete.         Bernadine Moritz, MSBS, PA-C  Board Certified by the [employer] on Certification of Physician Assistants  [address] [facility]  Partner of [facility] ([facility])         This dictation was performed with a verbal recognition program [facility] ) and it was checked for

## 2019-07-24 ENCOUNTER — OFFICE VISIT (OUTPATIENT)
Dept: FAMILY MEDICINE CLINIC | Age: 69
End: 2019-07-24
Payer: MEDICARE

## 2019-07-24 VITALS
DIASTOLIC BLOOD PRESSURE: 84 MMHG | BODY MASS INDEX: 37.54 KG/M2 | WEIGHT: 204 LBS | HEIGHT: 62 IN | SYSTOLIC BLOOD PRESSURE: 126 MMHG

## 2019-07-24 DIAGNOSIS — K21.9 GASTROESOPHAGEAL REFLUX DISEASE, ESOPHAGITIS PRESENCE NOT SPECIFIED: ICD-10-CM

## 2019-07-24 DIAGNOSIS — M51.36 DDD (DEGENERATIVE DISC DISEASE), LUMBAR: ICD-10-CM

## 2019-07-24 DIAGNOSIS — M17.10 ARTHRITIS OF KNEE: ICD-10-CM

## 2019-07-24 DIAGNOSIS — M70.62 TROCHANTERIC BURSITIS, LEFT HIP: ICD-10-CM

## 2019-07-24 DIAGNOSIS — R53.83 FATIGUE, UNSPECIFIED TYPE: ICD-10-CM

## 2019-07-24 DIAGNOSIS — F17.210 CIGARETTE NICOTINE DEPENDENCE WITHOUT COMPLICATION: ICD-10-CM

## 2019-07-24 DIAGNOSIS — E03.9 ACQUIRED HYPOTHYROIDISM: ICD-10-CM

## 2019-07-24 DIAGNOSIS — I10 ESSENTIAL HYPERTENSION: Primary | ICD-10-CM

## 2019-07-24 LAB
A/G RATIO: 1.5 (ref 1.1–2.2)
ALBUMIN SERPL-MCNC: 4.1 G/DL (ref 3.4–5)
ALP BLD-CCNC: 91 U/L (ref 40–129)
ALT SERPL-CCNC: 17 U/L (ref 10–40)
ANION GAP SERPL CALCULATED.3IONS-SCNC: 11 MMOL/L (ref 3–16)
AST SERPL-CCNC: 14 U/L (ref 15–37)
BASOPHILS ABSOLUTE: 0 K/UL (ref 0–0.2)
BASOPHILS RELATIVE PERCENT: 0.5 %
BILIRUB SERPL-MCNC: <0.2 MG/DL (ref 0–1)
BUN BLDV-MCNC: 11 MG/DL (ref 7–20)
CALCIUM SERPL-MCNC: 9.7 MG/DL (ref 8.3–10.6)
CHLORIDE BLD-SCNC: 102 MMOL/L (ref 99–110)
CO2: 29 MMOL/L (ref 21–32)
CREAT SERPL-MCNC: 0.8 MG/DL (ref 0.6–1.2)
EOSINOPHILS ABSOLUTE: 0.1 K/UL (ref 0–0.6)
EOSINOPHILS RELATIVE PERCENT: 1.3 %
GFR AFRICAN AMERICAN: >60
GFR NON-AFRICAN AMERICAN: >60
GLOBULIN: 2.7 G/DL
GLUCOSE BLD-MCNC: 109 MG/DL (ref 70–99)
HCT VFR BLD CALC: 41.6 % (ref 36–48)
HEMOGLOBIN: 13.4 G/DL (ref 12–16)
LYMPHOCYTES ABSOLUTE: 2.1 K/UL (ref 1–5.1)
LYMPHOCYTES RELATIVE PERCENT: 21 %
MCH RBC QN AUTO: 29 PG (ref 26–34)
MCHC RBC AUTO-ENTMCNC: 32.3 G/DL (ref 31–36)
MCV RBC AUTO: 89.8 FL (ref 80–100)
MONOCYTES ABSOLUTE: 0.5 K/UL (ref 0–1.3)
MONOCYTES RELATIVE PERCENT: 5.1 %
NEUTROPHILS ABSOLUTE: 7 K/UL (ref 1.7–7.7)
NEUTROPHILS RELATIVE PERCENT: 72.1 %
PDW BLD-RTO: 13.8 % (ref 12.4–15.4)
PLATELET # BLD: 329 K/UL (ref 135–450)
PMV BLD AUTO: 9.1 FL (ref 5–10.5)
POTASSIUM SERPL-SCNC: 4.5 MMOL/L (ref 3.5–5.1)
RBC # BLD: 4.64 M/UL (ref 4–5.2)
SODIUM BLD-SCNC: 142 MMOL/L (ref 136–145)
T4 FREE: 1.2 NG/DL (ref 0.9–1.8)
TOTAL PROTEIN: 6.8 G/DL (ref 6.4–8.2)
TSH SERPL DL<=0.05 MIU/L-ACNC: <0.01 UIU/ML (ref 0.27–4.2)
WBC # BLD: 9.8 K/UL (ref 4–11)

## 2019-07-24 PROCEDURE — G8417 CALC BMI ABV UP PARAM F/U: HCPCS | Performed by: FAMILY MEDICINE

## 2019-07-24 PROCEDURE — 1123F ACP DISCUSS/DSCN MKR DOCD: CPT | Performed by: FAMILY MEDICINE

## 2019-07-24 PROCEDURE — 36415 COLL VENOUS BLD VENIPUNCTURE: CPT | Performed by: FAMILY MEDICINE

## 2019-07-24 PROCEDURE — G8400 PT W/DXA NO RESULTS DOC: HCPCS | Performed by: FAMILY MEDICINE

## 2019-07-24 PROCEDURE — G8427 DOCREV CUR MEDS BY ELIG CLIN: HCPCS | Performed by: FAMILY MEDICINE

## 2019-07-24 PROCEDURE — 99214 OFFICE O/P EST MOD 30 MIN: CPT | Performed by: FAMILY MEDICINE

## 2019-07-24 PROCEDURE — 1090F PRES/ABSN URINE INCON ASSESS: CPT | Performed by: FAMILY MEDICINE

## 2019-07-24 PROCEDURE — 4004F PT TOBACCO SCREEN RCVD TLK: CPT | Performed by: FAMILY MEDICINE

## 2019-07-24 PROCEDURE — 3017F COLORECTAL CA SCREEN DOC REV: CPT | Performed by: FAMILY MEDICINE

## 2019-07-24 PROCEDURE — 4040F PNEUMOC VAC/ADMIN/RCVD: CPT | Performed by: FAMILY MEDICINE

## 2019-07-24 RX ORDER — CALCIPOTRIENE 50 UG/G
CREAM TOPICAL
Qty: 1 TUBE | Refills: 5 | Status: SHIPPED | OUTPATIENT
Start: 2019-07-24

## 2019-07-24 RX ORDER — VARENICLINE TARTRATE 1 MG/1
1 TABLET, FILM COATED ORAL 2 TIMES DAILY
Qty: 60 TABLET | Refills: 5 | Status: ON HOLD | OUTPATIENT
Start: 2019-07-24 | End: 2020-01-22

## 2019-07-24 RX ORDER — MELOXICAM 15 MG/1
15 TABLET ORAL DAILY PRN
Qty: 90 TABLET | Refills: 1 | Status: ON HOLD | OUTPATIENT
Start: 2019-07-24 | End: 2020-01-22

## 2019-07-24 ASSESSMENT — PATIENT HEALTH QUESTIONNAIRE - PHQ9
1. LITTLE INTEREST OR PLEASURE IN DOING THINGS: 1
2. FEELING DOWN, DEPRESSED OR HOPELESS: 1
SUM OF ALL RESPONSES TO PHQ9 QUESTIONS 1 & 2: 2
SUM OF ALL RESPONSES TO PHQ QUESTIONS 1-9: 2
SUM OF ALL RESPONSES TO PHQ QUESTIONS 1-9: 2

## 2019-07-25 RX ORDER — LISINOPRIL AND HYDROCHLOROTHIAZIDE 25; 20 MG/1; MG/1
TABLET ORAL
Qty: 90 TABLET | Refills: 0 | Status: SHIPPED | OUTPATIENT
Start: 2019-07-25 | End: 2019-10-02 | Stop reason: SDUPTHER

## 2019-08-28 ENCOUNTER — TELEPHONE (OUTPATIENT)
Dept: FAMILY MEDICINE CLINIC | Age: 69
End: 2019-08-28

## 2019-08-28 RX ORDER — BUPROPION HYDROCHLORIDE 150 MG/1
TABLET, EXTENDED RELEASE ORAL
Qty: 60 TABLET | Refills: 5 | Status: ON HOLD | OUTPATIENT
Start: 2019-08-28 | End: 2020-01-22

## 2019-10-02 RX ORDER — LISINOPRIL AND HYDROCHLOROTHIAZIDE 25; 20 MG/1; MG/1
TABLET ORAL
Qty: 90 TABLET | Refills: 1 | Status: SHIPPED | OUTPATIENT
Start: 2019-10-02 | End: 2019-11-01 | Stop reason: SDUPTHER

## 2019-10-04 ENCOUNTER — TELEPHONE (OUTPATIENT)
Dept: ORTHOPEDIC SURGERY | Age: 69
End: 2019-10-04

## 2019-10-09 ENCOUNTER — OFFICE VISIT (OUTPATIENT)
Dept: ORTHOPEDIC SURGERY | Age: 69
End: 2019-10-09
Payer: MEDICARE

## 2019-10-09 VITALS
HEART RATE: 88 BPM | WEIGHT: 203.93 LBS | SYSTOLIC BLOOD PRESSURE: 128 MMHG | HEIGHT: 62 IN | BODY MASS INDEX: 37.53 KG/M2 | DIASTOLIC BLOOD PRESSURE: 80 MMHG

## 2019-10-09 DIAGNOSIS — M51.36 DDD (DEGENERATIVE DISC DISEASE), LUMBAR: ICD-10-CM

## 2019-10-09 DIAGNOSIS — M47.816 SPONDYLOSIS WITHOUT MYELOPATHY OR RADICULOPATHY, LUMBAR REGION: ICD-10-CM

## 2019-10-09 DIAGNOSIS — M70.62 GREATER TROCHANTERIC BURSITIS, LEFT: Primary | ICD-10-CM

## 2019-10-09 PROCEDURE — 4004F PT TOBACCO SCREEN RCVD TLK: CPT | Performed by: PHYSICAL MEDICINE & REHABILITATION

## 2019-10-09 PROCEDURE — G8400 PT W/DXA NO RESULTS DOC: HCPCS | Performed by: PHYSICAL MEDICINE & REHABILITATION

## 2019-10-09 PROCEDURE — G8427 DOCREV CUR MEDS BY ELIG CLIN: HCPCS | Performed by: PHYSICAL MEDICINE & REHABILITATION

## 2019-10-09 PROCEDURE — 4040F PNEUMOC VAC/ADMIN/RCVD: CPT | Performed by: PHYSICAL MEDICINE & REHABILITATION

## 2019-10-09 PROCEDURE — 3017F COLORECTAL CA SCREEN DOC REV: CPT | Performed by: PHYSICAL MEDICINE & REHABILITATION

## 2019-10-09 PROCEDURE — G8483 FLU IMM NO ADMIN DOC REA: HCPCS | Performed by: PHYSICAL MEDICINE & REHABILITATION

## 2019-10-09 PROCEDURE — G8417 CALC BMI ABV UP PARAM F/U: HCPCS | Performed by: PHYSICAL MEDICINE & REHABILITATION

## 2019-10-09 PROCEDURE — 1090F PRES/ABSN URINE INCON ASSESS: CPT | Performed by: PHYSICAL MEDICINE & REHABILITATION

## 2019-10-09 PROCEDURE — 99214 OFFICE O/P EST MOD 30 MIN: CPT | Performed by: PHYSICAL MEDICINE & REHABILITATION

## 2019-10-09 PROCEDURE — 1123F ACP DISCUSS/DSCN MKR DOCD: CPT | Performed by: PHYSICAL MEDICINE & REHABILITATION

## 2019-10-09 PROCEDURE — 20611 DRAIN/INJ JOINT/BURSA W/US: CPT | Performed by: PHYSICAL MEDICINE & REHABILITATION

## 2019-11-01 RX ORDER — ESTRADIOL 1 MG/1
TABLET ORAL
Qty: 90 TABLET | Refills: 2 | Status: SHIPPED | OUTPATIENT
Start: 2019-11-01 | End: 2020-04-23 | Stop reason: SDUPTHER

## 2019-11-01 RX ORDER — LEVOTHYROXINE AND LIOTHYRONINE 76; 18 UG/1; UG/1
TABLET ORAL
Qty: 90 TABLET | Refills: 2 | Status: SHIPPED | OUTPATIENT
Start: 2019-11-01 | End: 2019-11-11 | Stop reason: SDUPTHER

## 2019-11-01 RX ORDER — LISINOPRIL AND HYDROCHLOROTHIAZIDE 25; 20 MG/1; MG/1
TABLET ORAL
Qty: 90 TABLET | Refills: 2 | Status: SHIPPED | OUTPATIENT
Start: 2019-11-01 | End: 2020-04-23 | Stop reason: SDUPTHER

## 2019-11-05 ENCOUNTER — TELEPHONE (OUTPATIENT)
Dept: FAMILY MEDICINE CLINIC | Age: 69
End: 2019-11-05

## 2019-11-08 ENCOUNTER — OFFICE VISIT (OUTPATIENT)
Dept: ORTHOPEDIC SURGERY | Age: 69
End: 2019-11-08
Payer: MEDICARE

## 2019-11-08 VITALS
DIASTOLIC BLOOD PRESSURE: 84 MMHG | HEIGHT: 62 IN | WEIGHT: 203.93 LBS | BODY MASS INDEX: 37.53 KG/M2 | SYSTOLIC BLOOD PRESSURE: 128 MMHG | HEART RATE: 86 BPM

## 2019-11-08 DIAGNOSIS — M70.62 GREATER TROCHANTERIC BURSITIS, LEFT: Primary | ICD-10-CM

## 2019-11-08 DIAGNOSIS — M47.816 SPONDYLOSIS WITHOUT MYELOPATHY OR RADICULOPATHY, LUMBAR REGION: ICD-10-CM

## 2019-11-08 DIAGNOSIS — M51.36 DDD (DEGENERATIVE DISC DISEASE), LUMBAR: ICD-10-CM

## 2019-11-08 PROCEDURE — 4040F PNEUMOC VAC/ADMIN/RCVD: CPT | Performed by: PHYSICAL MEDICINE & REHABILITATION

## 2019-11-08 PROCEDURE — G8417 CALC BMI ABV UP PARAM F/U: HCPCS | Performed by: PHYSICAL MEDICINE & REHABILITATION

## 2019-11-08 PROCEDURE — 4004F PT TOBACCO SCREEN RCVD TLK: CPT | Performed by: PHYSICAL MEDICINE & REHABILITATION

## 2019-11-08 PROCEDURE — G8427 DOCREV CUR MEDS BY ELIG CLIN: HCPCS | Performed by: PHYSICAL MEDICINE & REHABILITATION

## 2019-11-08 PROCEDURE — G8400 PT W/DXA NO RESULTS DOC: HCPCS | Performed by: PHYSICAL MEDICINE & REHABILITATION

## 2019-11-08 PROCEDURE — 1123F ACP DISCUSS/DSCN MKR DOCD: CPT | Performed by: PHYSICAL MEDICINE & REHABILITATION

## 2019-11-08 PROCEDURE — 3017F COLORECTAL CA SCREEN DOC REV: CPT | Performed by: PHYSICAL MEDICINE & REHABILITATION

## 2019-11-08 PROCEDURE — 99214 OFFICE O/P EST MOD 30 MIN: CPT | Performed by: PHYSICAL MEDICINE & REHABILITATION

## 2019-11-08 PROCEDURE — G8483 FLU IMM NO ADMIN DOC REA: HCPCS | Performed by: PHYSICAL MEDICINE & REHABILITATION

## 2019-11-08 PROCEDURE — 1090F PRES/ABSN URINE INCON ASSESS: CPT | Performed by: PHYSICAL MEDICINE & REHABILITATION

## 2019-11-11 RX ORDER — LEVOTHYROXINE AND LIOTHYRONINE 76; 18 UG/1; UG/1
TABLET ORAL
Qty: 90 TABLET | Refills: 2 | Status: SHIPPED | OUTPATIENT
Start: 2019-11-11 | End: 2020-04-23 | Stop reason: SDUPTHER

## 2019-12-05 ENCOUNTER — HOSPITAL ENCOUNTER (OUTPATIENT)
Dept: MRI IMAGING | Age: 69
Discharge: HOME | End: 2019-12-05
Payer: MEDICARE

## 2019-12-05 ENCOUNTER — HOSPITAL ENCOUNTER (OUTPATIENT)
Age: 69
Discharge: HOME | End: 2019-12-05
Payer: MEDICARE

## 2019-12-05 LAB
ANION GAP SERPL CALCULATED.3IONS-SCNC: 9 MMOL/L (ref 3–16)
BUN BLDV-MCNC: 14 MG/DL (ref 7–20)
CALCIUM SERPL-MCNC: 9.4 MG/DL (ref 8.3–10.6)
CHLORIDE BLD-SCNC: 102 MMOL/L (ref 99–110)
CO2: 29 MMOL/L (ref 21–32)
CREAT SERPL-MCNC: 0.6 MG/DL (ref 0.6–1.2)
GFR AFRICAN AMERICAN: >60
GFR NON-AFRICAN AMERICAN: >60
GLUCOSE BLD-MCNC: 94 MG/DL (ref 70–99)
POTASSIUM SERPL-SCNC: 4.6 MMOL/L (ref 3.5–5.1)
SODIUM BLD-SCNC: 140 MMOL/L (ref 136–145)

## 2019-12-05 PROCEDURE — 70553 MRI BRAIN STEM W/O & W/DYE: CPT

## 2019-12-05 PROCEDURE — A9579 GAD-BASE MR CONTRAST NOS,1ML: HCPCS | Performed by: OTOLARYNGOLOGY

## 2019-12-05 PROCEDURE — 36415 COLL VENOUS BLD VENIPUNCTURE: CPT

## 2019-12-05 PROCEDURE — 6360000004 HC RX CONTRAST MEDICATION: Performed by: OTOLARYNGOLOGY

## 2019-12-05 PROCEDURE — 80048 BASIC METABOLIC PNL TOTAL CA: CPT

## 2019-12-05 RX ADMIN — GADOTERIDOL 18 ML: 279.3 INJECTION, SOLUTION INTRAVENOUS at 15:03

## 2020-01-13 ENCOUNTER — TELEPHONE (OUTPATIENT)
Dept: ORTHOPEDIC SURGERY | Age: 70
End: 2020-01-13

## 2020-01-13 NOTE — LETTER
Please schedule the following with:     Date:   20 @ 11:30    Account: [id number]  Patient: Don Trinidad    : 1950  Address:  [place] [address] [address]    Phone (H):  [phone] (home)      ----------------------------------------------------------------------------------------------  Diagnosis:     ICD-10-CM    1. DDD (degenerative disc disease), lumbar M51.36    2. Spondylosis without myelopathy or radiculopathy, lumbar region M47.816    3. Lumbar disc herniation M51.26    4. Spondylolisthesis of lumbar region M43.16      Procedure: Interlaminar Epidural Steroid Injection     Levels: L4-5   Side: Midline   CPT Codes 01585    ----------------------------------------------------------------------------------------------  Injection # [id number]  [address] [facility]    Attending Physician       Katharine Marcus MD.  ----------------------------------------------------------------------------------------------  Injection Scheduled For:    At:    1st [facility]    Pre-Cert#    2nd 1 [employer]  Pre-Cert#    Comments:     · Infection control  ? Tested positive for MRSA in past 12 months:  no  ? Tested positive for MSSA \"staph infection\" in past 12 months: no  ? Tested positive for VRE (Vancomycin Resistant Enterococci) in past 12 months:   no  ? Currently on any antibiotics for an infection: no  · Anticoagulants:  ? On a blood thinner:  no   ?  Any history of bleeding disorder: no   · Advanced Liver disease: no   · Advanced Renal disease: no   · Glaucoma: no   · Diabetes: no      Sedation:         No  -----------------------------------------------------------------------------------------------  Allergies   Allergen Reactions    Codeine     Prednisone Nausea Only, Palpitations and Other (See Comments)     Dizziness, swollen eye,heart paplitations

## 2020-01-14 NOTE — PROGRESS NOTES
PATIENT REACHED   YES_x___NO____    PREOP INSTUCTIONS       DATE_1/22/20________ TIME__1130_______ARRIVAL_1030_______PLACE__[facility]__________  NOTHING TO EAT OR DRINK  AFTER MIDNIGHT THE EVENING PRIOR  YOU NEED A RESPONSIBLE ADULT AGE 18 OR OLDER TO DRIVE YOU HOME  PLEASE BRING INSURANCE CARD. PICTURE ID AND COMPLETE LIST OF MEDS  WEAR LOOSE COMFORTABLE CLOTHING  FOLLOW ANY INSTRUCTIONS YOUR DRS OFFICE HAS GIVEN YOU,INCLUDING WHAT MEDICATIONS TO TAKE THE AM OF PROCEDURE AND WHEN AND IF YOU NEED TO STOP ANY BLOOD THINNERS. IF YOU HAVE QUESTIONS REGARDING THIS CALL THE OFFICE  THE GOAL BLOOD SUGAR THE AM OF PROCEDURE  OR LESS ABOVE THAT THE PROCEDURE MAY BE CANCELLED  ANY QUESTIONS CALL YOUR DOCTOR. ALSO,PLEASE READ THE INSTRUCTION PACKET FROM YOUR DR IF YOU RECEIVED ONE.   SPINE INTERVENTION NUMBER -[phone]

## 2020-01-15 ENCOUNTER — TELEPHONE (OUTPATIENT)
Dept: ORTHOPEDIC SURGERY | Age: 70
End: 2020-01-15

## 2020-01-15 NOTE — TELEPHONE ENCOUNTER
DOS   01/22/2020  CPT   65763   04337.26  22097  DX   M51.36   M47.816   M51.26   M43.16  OP SX AUTH  NPR    LEVELS   L4  L5   PROCEDURE   INTRALAMINAR BRIE    [facility]:   MEDICARE

## 2020-01-22 ENCOUNTER — HOSPITAL ENCOUNTER (OUTPATIENT)
Age: 70
Setting detail: OUTPATIENT SURGERY
Discharge: HOME | End: 2020-01-22
Attending: PHYSICAL MEDICINE & REHABILITATION | Admitting: PHYSICAL MEDICINE & REHABILITATION
Payer: MEDICARE

## 2020-01-22 ENCOUNTER — APPOINTMENT (OUTPATIENT)
Dept: GENERAL RADIOLOGY | Age: 70
End: 2020-01-22
Attending: PHYSICAL MEDICINE & REHABILITATION
Payer: MEDICARE

## 2020-01-22 VITALS
HEART RATE: 78 BPM | TEMPERATURE: 97 F | WEIGHT: 197.13 LBS | HEIGHT: 62 IN | RESPIRATION RATE: 16 BRPM | SYSTOLIC BLOOD PRESSURE: 143 MMHG | OXYGEN SATURATION: 100 % | BODY MASS INDEX: 36.28 KG/M2 | DIASTOLIC BLOOD PRESSURE: 62 MMHG

## 2020-01-22 PROCEDURE — 99152 MOD SED SAME PHYS/QHP 5/>YRS: CPT | Performed by: PHYSICAL MEDICINE & REHABILITATION

## 2020-01-22 PROCEDURE — 6360000002 HC RX W HCPCS: Performed by: PHYSICAL MEDICINE & REHABILITATION

## 2020-01-22 PROCEDURE — 3610000054 HC PAIN LEVEL 3 BASE (NON-OR): Performed by: PHYSICAL MEDICINE & REHABILITATION

## 2020-01-22 PROCEDURE — 2709999900 HC NON-CHARGEABLE SUPPLY: Performed by: PHYSICAL MEDICINE & REHABILITATION

## 2020-01-22 PROCEDURE — 77003 FLUOROGUIDE FOR SPINE INJECT: CPT

## 2020-01-22 PROCEDURE — 2500000003 HC RX 250 WO HCPCS: Performed by: PHYSICAL MEDICINE & REHABILITATION

## 2020-01-22 RX ORDER — LIDOCAINE HYDROCHLORIDE 10 MG/ML
INJECTION, SOLUTION INFILTRATION; PERINEURAL
Status: COMPLETED | OUTPATIENT
Start: 2020-01-22 | End: 2020-01-22

## 2020-01-22 RX ORDER — MIDAZOLAM HYDROCHLORIDE 1 MG/ML
INJECTION INTRAMUSCULAR; INTRAVENOUS
Status: COMPLETED | OUTPATIENT
Start: 2020-01-22 | End: 2020-01-22

## 2020-01-22 RX ORDER — BETAMETHASONE SODIUM PHOSPHATE AND BETAMETHASONE ACETATE 3; 3 MG/ML; MG/ML
INJECTION, SUSPENSION INTRA-ARTICULAR; INTRALESIONAL; INTRAMUSCULAR; SOFT TISSUE
Status: COMPLETED | OUTPATIENT
Start: 2020-01-22 | End: 2020-01-22

## 2020-01-22 ASSESSMENT — PAIN DESCRIPTION - LOCATION
LOCATION: BACK;HIP
LOCATION: BACK;HIP

## 2020-01-22 ASSESSMENT — PAIN DESCRIPTION - DESCRIPTORS
DESCRIPTORS: ACHING;SPASM
DESCRIPTORS: ACHING;SQUEEZING;SPASM
DESCRIPTORS: ACHING

## 2020-01-22 ASSESSMENT — PAIN DESCRIPTION - FREQUENCY: FREQUENCY: INTERMITTENT

## 2020-01-22 ASSESSMENT — PAIN DESCRIPTION - PAIN TYPE
TYPE: CHRONIC PAIN
TYPE: CHRONIC PAIN

## 2020-01-22 ASSESSMENT — PAIN DESCRIPTION - ORIENTATION
ORIENTATION: MID;LEFT;LOWER
ORIENTATION: MID;LEFT;LOWER

## 2020-01-22 ASSESSMENT — PAIN - FUNCTIONAL ASSESSMENT: PAIN_FUNCTIONAL_ASSESSMENT: 0-10

## 2020-01-22 ASSESSMENT — PAIN SCALES - GENERAL
PAINLEVEL_OUTOF10: 1
PAINLEVEL_OUTOF10: 1

## 2020-01-22 NOTE — OP NOTE
Patient:  Jun Mcgovern  YOB: 1950  Medical Record #:  [id number]   Place:   [address] [place] [address]  Date:  1/22/2020   Physician:  Xochitl Mack MD, LEYDA    Procedure:  Lumbar Epidural Steroid Injection - Interlaminar approach  L4 - L5    Pre-Procedure Diagnosis: Lumbar radiculopathy    Post-Procedure Diagnosis: Same    Sedation: Local with 1% Lidocaine 3 ml and 2 mg of IV Versed    EBL: None    Complications: None    Procedure Summary:    The patient was brought to the procedure suite and placed in the prone position. The skin overlying the lumbar spine was prepped and draped in the usual sterile fashion. Using fluoroscopic guidance, the L4 - L5 interlaminar space was identified. Through anesthetized skin a 20 gauge Touhy needle was advanced into the epidural space using continuous loss of resistance to saline technique. Isovue M300 was instilled and an epidurogram was noted without evidence of intrathecal or vascular spread. 10 ml of a solution containing preservative free normal saline and 80 mg of Depomedrol was instilled. The needle was removed and a band-aid applied. The patient was transferred to the post-operative area in stable condition.

## 2020-01-22 NOTE — H&P
HISTORY AND PHYSICAL/PRE-SEDATION ASSESSMENT    Patient:  Vinod Alex   :  1950  Medical Record No.:  [id number]   Date:  2020  Physician:  Marquita Angel M.D. Facility: [address] [place] [address]    HISTORY OF PRESENT ILLNESS:                 The patient is a 77 y.o. female whom presents with lower back and leg pain. Review of the imaging and physical exam of the patient confirmed the pre-procedure diagnosis. After a thorough discussion of risks, benefits and alternatives informed consent was obtained. Past Medical History:   Past Medical History:   Diagnosis Date    Delayed gastric emptying 3/27/2014    Hayfever     Hypertension     Hypothyroid 2011    Right knee pain 2013      Past Surgical History:     Past Surgical History:   Procedure Laterality Date    ADENOIDECTOMY      AS A CHILD    COLONOSCOPY      normal    EPIDURAL STEROID INJECTION N/A 2019    L4/L5 INTERLAMINAR EPIDURAL STEROID INJECTION WITH FLUOROSCOPY performed by Marquita Angel MD at [place] [address] ovaries    TONSILLECTOMY      AS CHILD    WISDOM TOOTH EXTRACTION       Current Medications:   Prior to Admission medications    Medication Sig Start Date End Date Taking? Authorizing Provider   omeprazole (PRILOSEC) 40 MG delayed release capsule TAKE 1 CAPSULE EVERY DAY 19   PRAMOD Crawford - CNP   thyroid [facility] THYROID) 120 MG tablet TAKE 1 TABLET EVERY DAY 19   Stephani Randolph MD   lisinopril-hydrochlorothiazide MON [facility]) 20-25 MG per tablet TAKE 1 TABLET EVERY DAY 19   Stephani Randolph MD   estradiol (ESTRACE) 1 MG tablet TAKE 1/2 TO 1 TABLET BY MOUTH DAILY 19   Stephani Randolph MD   calcipotriene (DOVONEX) 0.005 % cream Apply topically 2 times daily.  19   Stephani Randolph MD   varenicline (CHANTIX) 1 MG tablet Take 1 tablet by mouth 2 times daily 19   Stephani Randolph MD   Specialty Vitamins Products (MM BIOTIN/KERATIN (soft palate, fauces & uvula are visible)      Sedation plan:   [x]  Local              []  Minimal                  []  General anesthesia    Patient's condition acceptable for planned procedure/sedation. Post Procedure Plan   Return to same level of care   ______________________     The risks and benefits as well as alternatives to the procedure have been discussed with the patient and or family. The patient and or next of kin understands and agrees to proceed.     Ángel Todd M.D.

## 2020-02-06 ENCOUNTER — OFFICE VISIT (OUTPATIENT)
Dept: ORTHOPEDIC SURGERY | Age: 70
End: 2020-02-06
Payer: MEDICARE

## 2020-02-06 VITALS
SYSTOLIC BLOOD PRESSURE: 105 MMHG | HEART RATE: 102 BPM | WEIGHT: 197.09 LBS | DIASTOLIC BLOOD PRESSURE: 60 MMHG | BODY MASS INDEX: 36.27 KG/M2 | HEIGHT: 62 IN

## 2020-02-06 PROCEDURE — 1090F PRES/ABSN URINE INCON ASSESS: CPT | Performed by: PHYSICIAN ASSISTANT

## 2020-02-06 PROCEDURE — 4040F PNEUMOC VAC/ADMIN/RCVD: CPT | Performed by: PHYSICIAN ASSISTANT

## 2020-02-06 PROCEDURE — 1123F ACP DISCUSS/DSCN MKR DOCD: CPT | Performed by: PHYSICIAN ASSISTANT

## 2020-02-06 PROCEDURE — G8400 PT W/DXA NO RESULTS DOC: HCPCS | Performed by: PHYSICIAN ASSISTANT

## 2020-02-06 PROCEDURE — G8483 FLU IMM NO ADMIN DOC REA: HCPCS | Performed by: PHYSICIAN ASSISTANT

## 2020-02-06 PROCEDURE — G8427 DOCREV CUR MEDS BY ELIG CLIN: HCPCS | Performed by: PHYSICIAN ASSISTANT

## 2020-02-06 PROCEDURE — 3017F COLORECTAL CA SCREEN DOC REV: CPT | Performed by: PHYSICIAN ASSISTANT

## 2020-02-06 PROCEDURE — 4004F PT TOBACCO SCREEN RCVD TLK: CPT | Performed by: PHYSICIAN ASSISTANT

## 2020-02-06 PROCEDURE — G8417 CALC BMI ABV UP PARAM F/U: HCPCS | Performed by: PHYSICIAN ASSISTANT

## 2020-02-06 PROCEDURE — 99212 OFFICE O/P EST SF 10 MIN: CPT | Performed by: PHYSICIAN ASSISTANT

## 2020-02-06 NOTE — PROGRESS NOTES
Laterality Date    ADENOIDECTOMY      AS A CHILD    APPENDECTOMY      COLONOSCOPY  2008    normal    EPIDURAL STEROID INJECTION N/A 4/19/2019    L4/L5 INTERLAMINAR EPIDURAL STEROID INJECTION WITH FLUOROSCOPY performed by Tami Carrasco MD at [place] [address] ovaries    PAIN MANAGEMENT PROCEDURE N/A 1/22/2020    L4L5 INTERLAMINAR EPIDURAL STEROID INJECTION WITH FLUOROSCOPY performed by Tami Carrasco MD at [address] [place] [address]    WISDOM TOOTH EXTRACTION       Current Medications:     Current Outpatient Medications:     Magnesium 200 MG CHEW, Take by mouth, Disp: , Rfl:     omeprazole (PRILOSEC) 40 MG delayed release capsule, TAKE 1 CAPSULE EVERY DAY, Disp: 90 capsule, Rfl: 1    thyroid (ARMOUR THYROID) 120 MG tablet, TAKE 1 TABLET EVERY DAY, Disp: 90 tablet, Rfl: 2    lisinopril-hydrochlorothiazide (PRINZIDE;ZESTORETIC) 20-25 MG per tablet, TAKE 1 TABLET EVERY DAY, Disp: 90 tablet, Rfl: 2    estradiol (ESTRACE) 1 MG tablet, TAKE 1/2 TO 1 TABLET BY MOUTH DAILY, Disp: 90 tablet, Rfl: 2    calcipotriene (DOVONEX) 0.005 % cream, Apply topically 2 times daily. , Disp: 1 Tube, Rfl: 5    Specialty Vitamins Products (MM BIOTIN/KERATIN PO), Take by mouth, Disp: , Rfl:     tiZANidine (ZANAFLEX) 4 MG tablet, TAKE 1 TABLET BY MOUTH NIGHTLY AS NEEDED (SPASMS), Disp: 30 tablet, Rfl: 0    Cholecalciferol (VITAMIN D3) 2000 UNITS TABS, Take 2 tablets by mouth daily, Disp: , Rfl:     Multiple Vitamins-Minerals (MULTIVITAMIN PO), Take 1 tablet by mouth daily, Disp: , Rfl:     Omega-3 Fatty Acids (FISH OIL) 1000 MG CAPS, Take 3,000 mg by mouth daily, Disp: , Rfl:     POTASSIUM PO, Take 1 tablet by mouth daily. , Disp: , Rfl:   Allergies:  Codeine and Prednisone  Social History:    reports that she has been smoking. She has a 22.00 pack-year smoking history. She has never used smokeless tobacco. She reports that she does not drink alcohol or use drugs.   Family History:   Family bilateral L5-S1. Results for orders placed or performed during the hospital encounter of    Basic Metabolic Panel   Result Value Ref Range    Sodium 140 136 - 145 mmol/L    Potassium 4.6 3.5 - 5.1 mmol/L    Chloride 102 99 - 110 mmol/L    CO2 29 21 - 32 mmol/L    Anion Gap 9 3 - 16    Glucose 94 70 - 99 mg/dL    BUN 14 7 - 20 mg/dL    CREATININE 0.6 0.6 - 1.2 mg/dL    GFR Non-African American >60 >60    GFR African American >60 >60    Calcium 9.4 8.3 - 10.6 mg/dL     Impression:       1. Lumbar disc herniation    2. DDD (degenerative disc disease), lumbar    3. Spondylosis without myelopathy or radiculopathy, lumbar region    4. Spondylolisthesis of lumbar region        Plan:  Clinical Course: Above diagnoses are improving     I discussed the diagnosis and the treatment options with Wilfredo Tineo today. In Summary:  The various treatment options were outlined and discussed with Wilfredo Tineo including:  Conservative care options: physical therapy, ice, medications, bracing, and activity modification. The indications for therapeutic injections. The indications for additional imaging/laboratory studies. The indications for (possible future) interventions. After considering the various options discussed, Wilfredo Tineo elected to pursue a course of treatment that includes the followin. Medications:  No further recommendations for new medications. 2. PT:  Encouraged to continue with Home exercise program.    3. Further studies: No further studies. 4. Interventional: Patient is 100% improved at this time following a L4-5 interlaminar epidural steroid injection. 5. Follow up:  As needed. Wilfredo Tineo was instructed to call the office if her symptoms worsen or if new symptoms appear prior to the next scheduled visit.  She is specifically instructed to contact the office between now & her scheduled appointment if she has concerns related to her condition or if she needs assistance in scheduling the above tests. She is welcome to call for an appointment sooner if she has any additional concerns or questions. I, Vanessa Mahoney ATC, am scribing for and in the presence of Laura Cho [address] [place], [place].  02/06/20 2:01 PM Vanessa Brown Nose. The physical examination was performed between the patient and VINNY Odonnell All counseling during the appointment was performed between the patient and the provider. Quinton Daniel PA-C, personally performed the services described in this documentation as scribed by Vanessa Mahoney ATC in my presence and it is both accurate and complete. UNA Schwartz PA-C  Board Certified by the [employer] on Certification of Physician Assistants  [address] [facility]  Partner of [facility] ([facility])        This dictation was performed with a verbal recognition program [facility]) and it was checked for errors. It is possible that there are still dictated errors within this office note. If so, please bring any errors to my attention for an addendum. All efforts were made to ensure that this office note is accurate.

## 2020-03-04 ENCOUNTER — OFFICE VISIT (OUTPATIENT)
Dept: ORTHOPEDIC SURGERY | Age: 70
End: 2020-03-04
Payer: MEDICARE

## 2020-03-04 VITALS
HEIGHT: 62 IN | DIASTOLIC BLOOD PRESSURE: 71 MMHG | BODY MASS INDEX: 36.27 KG/M2 | HEART RATE: 90 BPM | WEIGHT: 197.09 LBS | SYSTOLIC BLOOD PRESSURE: 136 MMHG

## 2020-03-04 PROCEDURE — G8400 PT W/DXA NO RESULTS DOC: HCPCS | Performed by: PHYSICAL MEDICINE & REHABILITATION

## 2020-03-04 PROCEDURE — G8427 DOCREV CUR MEDS BY ELIG CLIN: HCPCS | Performed by: PHYSICAL MEDICINE & REHABILITATION

## 2020-03-04 PROCEDURE — 3017F COLORECTAL CA SCREEN DOC REV: CPT | Performed by: PHYSICAL MEDICINE & REHABILITATION

## 2020-03-04 PROCEDURE — G8417 CALC BMI ABV UP PARAM F/U: HCPCS | Performed by: PHYSICAL MEDICINE & REHABILITATION

## 2020-03-04 PROCEDURE — G8483 FLU IMM NO ADMIN DOC REA: HCPCS | Performed by: PHYSICAL MEDICINE & REHABILITATION

## 2020-03-04 PROCEDURE — 1123F ACP DISCUSS/DSCN MKR DOCD: CPT | Performed by: PHYSICAL MEDICINE & REHABILITATION

## 2020-03-04 PROCEDURE — 4004F PT TOBACCO SCREEN RCVD TLK: CPT | Performed by: PHYSICAL MEDICINE & REHABILITATION

## 2020-03-04 PROCEDURE — 1090F PRES/ABSN URINE INCON ASSESS: CPT | Performed by: PHYSICAL MEDICINE & REHABILITATION

## 2020-03-04 PROCEDURE — 99214 OFFICE O/P EST MOD 30 MIN: CPT | Performed by: PHYSICAL MEDICINE & REHABILITATION

## 2020-03-04 PROCEDURE — 4040F PNEUMOC VAC/ADMIN/RCVD: CPT | Performed by: PHYSICAL MEDICINE & REHABILITATION

## 2020-03-04 RX ORDER — GABAPENTIN 300 MG/1
300 CAPSULE ORAL NIGHTLY
Qty: 30 CAPSULE | Refills: 0 | Status: SHIPPED | OUTPATIENT
Start: 2020-03-04 | End: 2020-03-26

## 2020-03-04 NOTE — PROGRESS NOTES
Lele Mckeon MD at [address] [facility] EXTRACTION       Current Medications:     Current Outpatient Medications:     gabapentin (NEURONTIN) 300 MG capsule, Take 1 capsule by mouth nightly for 30 days. Intended supply: 90 days, Disp: 30 capsule, Rfl: 0    Magnesium 200 MG CHEW, Take by mouth, Disp: , Rfl:     omeprazole (PRILOSEC) 40 MG delayed release capsule, TAKE 1 CAPSULE EVERY DAY, Disp: 90 capsule, Rfl: 1    thyroid (ARMOUR THYROID) 120 MG tablet, TAKE 1 TABLET EVERY DAY, Disp: 90 tablet, Rfl: 2    lisinopril-hydrochlorothiazide (PRINZIDE;ZESTORETIC) 20-25 MG per tablet, TAKE 1 TABLET EVERY DAY, Disp: 90 tablet, Rfl: 2    estradiol (ESTRACE) 1 MG tablet, TAKE 1/2 TO 1 TABLET BY MOUTH DAILY, Disp: 90 tablet, Rfl: 2    calcipotriene (DOVONEX) 0.005 % cream, Apply topically 2 times daily. , Disp: 1 Tube, Rfl: 5    Specialty Vitamins Products (MM BIOTIN/KERATIN PO), Take by mouth, Disp: , Rfl:     tiZANidine (ZANAFLEX) 4 MG tablet, TAKE 1 TABLET BY MOUTH NIGHTLY AS NEEDED (SPASMS), Disp: 30 tablet, Rfl: 0    Cholecalciferol (VITAMIN D3) 2000 UNITS TABS, Take 2 tablets by mouth daily, Disp: , Rfl:     Multiple Vitamins-Minerals (MULTIVITAMIN PO), Take 1 tablet by mouth daily, Disp: , Rfl:     Omega-3 Fatty Acids (FISH OIL) 1000 MG CAPS, Take 3,000 mg by mouth daily, Disp: , Rfl:     POTASSIUM PO, Take 1 tablet by mouth daily. , Disp: , Rfl:   Allergies:  Codeine and Prednisone  Social History:    reports that she has been smoking. She has a 22.00 pack-year smoking history. She has never used smokeless tobacco. She reports that she does not drink alcohol or use drugs.   Family History:   Family History   Problem Relation Age of Onset    Dementia Mother     Mental Illness Mother     High Blood Pressure Mother     Heart Disease Father     High Blood Pressure Father     Stroke Maternal Grandmother     Heart Disease Other         dad's family       REVIEW OF SYSTEMS: CONSTITUTIONAL: Denies unexplained weight loss, fevers, chills or fatigue  NEUROLOGICAL: Denies unsteady gait or progressive weakness  MUSCULOSKELETAL: Denies joint swelling or redness  GI: Denies nausea, vomiting, diarrhea   : Denies bowel or bladder issues       PHYSICAL EXAM:    Vitals: Blood pressure 136/71, pulse 90, height 5' 2.01\" (1.575 m), weight 197 lb 1.5 oz (89.4 kg), not currently breastfeeding. GENERAL EXAM:  · General Apparence: Patient is adequately groomed with no evidence of malnutrition. · Psychiatric: Orientation: The patient is oriented to time, place and person. The patient's mood and affect are appropriate   · Vascular: Examination reveals no swelling and palpation reveals no tenderness in upper or lower extremities. Good capillary refill. · The lymphatic examination of the neck, axillae and groin reveals all areas to be without enlargement or induration  · Sensation is intact without deficit in the upper and lower extremities to light touch and pinprick  · Coordination of the upper and lower extremities are normal.    CERVICAL EXAMINATION:  · Inspection: Local inspection shows no step-off or bruising. Cervical alignment shows reversal lordosis. No instability is noted. · Palpation and Percussion: No evidence of tenderness at the midline. Paraspinal tenderness is present. There is no paraspinal spasm. Skin:There are no rashes, ulcerations or lesions  · Range of Motion:  limited by 25% in all planes due to pain   · Strength: 5/5 bilateral upper extremities  · Special Tests:   Spurling's + on right and Martinez's are negative bilaterally. Easton and Impingement tests are negative bilaterally. · Skin:There are no rashes, ulcerations or lesions in right & left upper extremities. · Reflexes: Bilaterally triceps, biceps and brachioradialis are 2+. Clonus absent bilaterally at the feet. No pathological reflexes are noted.   · Gait & station: normal, patient ambulates without assistance and no ataxia  · Additional Examinations:  · RIGHT UPPER EXTREMITY:  Inspection/examination of the right upper extremity does not show any tenderness, deformity or injury. Range of motion is unremarkable and pain-free. There is no gross instability. There are no rashes, ulcerations or lesions. Strength and tone are normal. No atrophy or abnormal movements are noted. · LEFT UPPER EXTREMITY: Inspection/examination of the left upper extremity does not show any tenderness, deformity or injury. Range of motion is unremarkable and pain-free. There is no gross instability. There are no rashes, ulcerations or lesions. Strength and tone are normal. No atrophy or abnormal movements are noted. · Additional Examinations:  · RIGHT LOWER EXTREMITY: Inspection/examination of the right lower extremity does not show any tenderness, deformity or injury. Range of motion is normal and pain-free. There is no gross instability. There are no rashes, ulcerations or lesions. Strength and tone are normal. No atrophy or abnormal movements are noted. · LEFT LOWER EXTREMITY:  Inspection/examination of the left lower extremity does not show any tenderness, deformity or injury. Range of motion is normal and pain-free. There is no gross instability. There are no rashes, ulcerations or lesions. Strength and tone are normal. No atrophy or abnormal movements are noted.       Diagnostic Testing:    AP and lateral of cervical spine shows - reversal lordosis and C5-7 DDD  Results for orders placed or performed during the hospital encounter of 12/52/95   Basic Metabolic Panel   Result Value Ref Range    Sodium 140 136 - 145 mmol/L    Potassium 4.6 3.5 - 5.1 mmol/L    Chloride 102 99 - 110 mmol/L    CO2 29 21 - 32 mmol/L    Anion Gap 9 3 - 16    Glucose 94 70 - 99 mg/dL    BUN 14 7 - 20 mg/dL    CREATININE 0.6 0.6 - 1.2 mg/dL    GFR Non-African American >60 >60    GFR African American >60 >60    Calcium 9.4 8.3 - 10.6 mg/dL Impression:       1. Cervical radiculopathy    2. DDD (degenerative disc disease), cervical    3. Pain of cervical spine        Plan:  Clinical Course: Above diagnoses are worsening    I discussed the diagnosis and the treatment options with Briseida Mullins today. In Summary:  The various treatment options were outlined and discussed with Briseida Mullins including:  Conservative care options: physical therapy, ice, medications, bracing, and activity modification. The indications for therapeutic injections. The indications for additional imaging/laboratory studies. The indications for (possible future) interventions. After considering the various options discussed, Briseida Mullins elected to pursue a course of treatment that includes the followin. Medications:  Neurontin 300 mg po qhs. Risks benefits and alternatives were discussed. 2. PT:  Encouraged to continue with Home exercise program.    3. Further studies: MR Cervical spine to evaluate for cervical stenosis      4. Interventional:  After further imaging is obtained, interventional options will be reviewed and recommended. 5. Follow up:  1-2 weeks      Briseida Mullins was instructed to call the office if her symptoms worsen or if new symptoms appear prior to the next scheduled visit. She is specifically instructed to contact the office between now & her scheduled appointment if she has concerns related to her condition or if she needs assistance in scheduling the above tests. She is welcome to call for an appointment sooner if she has any additional concerns or questions. Claudell Drain. Fede Nava MD, LEYDA, [facility]  Board Certified in [address] [place] [address] Certified and Fellowship Trained in [facility] ([facility])             This dictation was performed with a verbal recognition program [facility]) and it was checked for errors.  It is possible that there are

## 2020-03-10 ENCOUNTER — HOSPITAL ENCOUNTER (OUTPATIENT)
Dept: MRI IMAGING | Age: 70
Discharge: HOME | End: 2020-03-10
Payer: MEDICARE

## 2020-03-10 PROCEDURE — 72141 MRI NECK SPINE W/O DYE: CPT

## 2020-03-19 ENCOUNTER — TELEPHONE (OUTPATIENT)
Dept: ORTHOPEDIC SURGERY | Age: 70
End: 2020-03-19

## 2020-03-26 RX ORDER — GABAPENTIN 300 MG/1
300 CAPSULE ORAL NIGHTLY
Qty: 30 CAPSULE | Refills: 0 | Status: SHIPPED | OUTPATIENT
Start: 2020-03-26 | End: 2020-06-18

## 2020-03-26 NOTE — TELEPHONE ENCOUNTER
Patient last seen 3/4/20 and medication last filled 3/4/20:    Impression:         1. Cervical radiculopathy    2. DDD (degenerative disc disease), cervical    3. Pain of cervical spine          Plan:  Clinical Course: Above diagnoses are worsening     I discussed the diagnosis and the treatment options with Leonid Lee today.      In Summary:  The various treatment options were outlined and discussed with Leonid Lee including:  Conservative care options: physical therapy, ice, medications, bracing, and activity modification. The indications for therapeutic injections. The indications for additional imaging/laboratory studies. The indications for (possible future) interventions.      After considering the various options discussed, Leonid Lee elected to pursue a course of treatment that includes the followin. Medications:  Neurontin 300 mg po qhs. Risks benefits and alternatives were discussed.      2. PT:  Encouraged to continue with Home exercise program.     3. Further studies: MR Cervical spine to evaluate for cervical stenosis       4. Interventional:  After further imaging is obtained, interventional options will be reviewed and recommended.     5.  Follow up:  1-2 weeks

## 2020-04-23 ENCOUNTER — VIRTUAL VISIT (OUTPATIENT)
Dept: FAMILY MEDICINE CLINIC | Age: 70
End: 2020-04-23
Payer: MEDICARE

## 2020-04-23 PROCEDURE — 1090F PRES/ABSN URINE INCON ASSESS: CPT | Performed by: FAMILY MEDICINE

## 2020-04-23 PROCEDURE — G8427 DOCREV CUR MEDS BY ELIG CLIN: HCPCS | Performed by: FAMILY MEDICINE

## 2020-04-23 PROCEDURE — 1123F ACP DISCUSS/DSCN MKR DOCD: CPT | Performed by: FAMILY MEDICINE

## 2020-04-23 PROCEDURE — 99214 OFFICE O/P EST MOD 30 MIN: CPT | Performed by: FAMILY MEDICINE

## 2020-04-23 PROCEDURE — 3017F COLORECTAL CA SCREEN DOC REV: CPT | Performed by: FAMILY MEDICINE

## 2020-04-23 PROCEDURE — 4040F PNEUMOC VAC/ADMIN/RCVD: CPT | Performed by: FAMILY MEDICINE

## 2020-04-23 PROCEDURE — G8400 PT W/DXA NO RESULTS DOC: HCPCS | Performed by: FAMILY MEDICINE

## 2020-04-23 RX ORDER — OMEPRAZOLE 40 MG/1
CAPSULE, DELAYED RELEASE ORAL
Qty: 90 CAPSULE | Refills: 3 | Status: SHIPPED | OUTPATIENT
Start: 2020-04-23 | End: 2020-12-31 | Stop reason: SDUPTHER

## 2020-04-23 RX ORDER — ESTRADIOL 0.5 MG/1
0.5 TABLET ORAL DAILY
Qty: 90 TABLET | Refills: 3 | Status: SHIPPED | OUTPATIENT
Start: 2020-04-23 | End: 2020-12-31

## 2020-04-23 RX ORDER — MELOXICAM 15 MG/1
TABLET ORAL
COMMUNITY
Start: 2019-07-24 | End: 2020-04-23 | Stop reason: SDUPTHER

## 2020-04-23 RX ORDER — LEVOTHYROXINE AND LIOTHYRONINE 76; 18 UG/1; UG/1
TABLET ORAL
Qty: 90 TABLET | Refills: 3 | Status: SHIPPED | OUTPATIENT
Start: 2020-04-23 | End: 2021-03-03 | Stop reason: SDUPTHER

## 2020-04-23 RX ORDER — LISINOPRIL AND HYDROCHLOROTHIAZIDE 25; 20 MG/1; MG/1
TABLET ORAL
Qty: 90 TABLET | Refills: 3 | Status: SHIPPED | OUTPATIENT
Start: 2020-04-23 | End: 2021-02-24

## 2020-04-23 RX ORDER — MELOXICAM 15 MG/1
15 TABLET ORAL DAILY
Qty: 30 TABLET | Refills: 5 | Status: SHIPPED | OUTPATIENT
Start: 2020-04-23 | End: 2020-12-09 | Stop reason: ALTCHOICE

## 2020-04-23 RX ORDER — TIZANIDINE 4 MG/1
4 TABLET ORAL NIGHTLY PRN
Qty: 30 TABLET | Refills: 5 | Status: SHIPPED | OUTPATIENT
Start: 2020-04-23 | End: 2021-03-03 | Stop reason: SDUPTHER

## 2020-04-23 RX ORDER — AZITHROMYCIN 250 MG/1
250 TABLET, FILM COATED ORAL SEE ADMIN INSTRUCTIONS
Qty: 6 TABLET | Refills: 0 | Status: SHIPPED | OUTPATIENT
Start: 2020-04-23 | End: 2020-04-28

## 2020-04-23 ASSESSMENT — PATIENT HEALTH QUESTIONNAIRE - PHQ9
SUM OF ALL RESPONSES TO PHQ QUESTIONS 1-9: 0
2. FEELING DOWN, DEPRESSED OR HOPELESS: 0
SUM OF ALL RESPONSES TO PHQ9 QUESTIONS 1 & 2: 0
SUM OF ALL RESPONSES TO PHQ QUESTIONS 1-9: 0
1. LITTLE INTEREST OR PLEASURE IN DOING THINGS: 0

## 2020-04-23 NOTE — PROGRESS NOTES
atraumatic. [] Abnormal   [] Mouth/Throat: Mucous membranes are moist.     External Ears [] Normal  [] Abnormal-     Neck: [] No visualized mass     Pulmonary/Chest: [x] Respiratory effort normal.  [x] No visualized signs of difficulty breathing or respiratory distress        [] Abnormal-      Musculoskeletal:   [] Normal gait with no signs of ataxia         [x] Normal range of motion of neck        [] Abnormal-       Neurological:        [x] No Facial Asymmetry (Cranial nerve 7 motor function) (limited exam to video visit)          [] No gaze palsy        [] Abnormal-         Skin:        [x] No significant exanthematous lesions or discoloration noted on facial skin         [] Abnormal-            Psychiatric:       [x] Normal Affect [] No Hallucinations        [] Abnormal-     Other pertinent observable physical exam findings-     ASSESSMENT/PLAN:  There are no diagnoses linked to this encounter. Diagnosis Orders   1. Essential hypertension     2. Acquired hypothyroidism     3. DDD (degenerative disc disease), lumbar     4. Cigarette nicotine dependence in remission     5. Gastroesophageal reflux disease, esophagitis presence not specified     6. Post-nasal drainage  azithromycin (ZITHROMAX) 250 MG tablet     zpak if sxs worsen significantly o/w antihistamines / mucinex/ zicam for sxs  congrats on smoking cessation  Weight concerns d/w pt  Cont prilosec 40/d but having frequent breakthrough gerd sxs  Consider gi eval though weight gain may play a role  Diet/activity fallon for back d/ w pt  Doing well s/p injections  Check lipid,cmp, tsh, free t4 in July  Refills done  Limit use of meloxicam/ nsaids - zanaflex prn  bp seems stable - check bp if possible on prinizide  No follow-ups on file. Trenton Chu is a 72 y.o. female being evaluated by a Virtual Visit (video visit) encounter to address concerns as mentioned above. A caregiver was present when appropriate.  Due to this being a TeleHealth encounter

## 2020-05-04 ENCOUNTER — TELEPHONE (OUTPATIENT)
Dept: FAMILY MEDICINE CLINIC | Age: 70
End: 2020-05-04

## 2020-05-04 RX ORDER — POTASSIUM CHLORIDE 750 MG/1
10 TABLET, EXTENDED RELEASE ORAL DAILY PRN
Qty: 10 TABLET | Refills: 0 | Status: SHIPPED | OUTPATIENT
Start: 2020-05-04 | End: 2021-03-12

## 2020-05-04 RX ORDER — FUROSEMIDE 20 MG/1
20 TABLET ORAL DAILY PRN
Qty: 10 TABLET | Refills: 0 | Status: SHIPPED | OUTPATIENT
Start: 2020-05-04 | End: 2021-03-12

## 2020-05-27 ENCOUNTER — VIRTUAL VISIT (OUTPATIENT)
Dept: FAMILY MEDICINE CLINIC | Age: 70
End: 2020-05-27
Payer: MEDICARE

## 2020-05-27 PROCEDURE — 1123F ACP DISCUSS/DSCN MKR DOCD: CPT | Performed by: FAMILY MEDICINE

## 2020-05-27 PROCEDURE — 3017F COLORECTAL CA SCREEN DOC REV: CPT | Performed by: FAMILY MEDICINE

## 2020-05-27 PROCEDURE — G8427 DOCREV CUR MEDS BY ELIG CLIN: HCPCS | Performed by: FAMILY MEDICINE

## 2020-05-27 PROCEDURE — 99213 OFFICE O/P EST LOW 20 MIN: CPT | Performed by: FAMILY MEDICINE

## 2020-05-27 PROCEDURE — 4040F PNEUMOC VAC/ADMIN/RCVD: CPT | Performed by: FAMILY MEDICINE

## 2020-05-27 PROCEDURE — G8400 PT W/DXA NO RESULTS DOC: HCPCS | Performed by: FAMILY MEDICINE

## 2020-05-27 PROCEDURE — 1090F PRES/ABSN URINE INCON ASSESS: CPT | Performed by: FAMILY MEDICINE

## 2020-05-27 NOTE — PROGRESS NOTES
(ARMOUR THYROID) 120 MG tablet TAKE 1 TABLET EVERY DAY Yes Annamaria Mendoza MD   lisinopril-hydroCHLOROthiazide (PRINZIDE;ZESTORETIC) 20-25 MG per tablet TAKE 1 TABLET EVERY DAY Yes Annamaria Mendoza MD   estradiol (ESTRACE) 0.5 MG tablet Take 1 tablet by mouth daily Yes Annamaria Mendoza MD   Magnesium 200 MG CHEW Take by mouth Yes Historical Provider, MD   calcipotriene (DOVONEX) 0.005 % cream Apply topically 2 times daily. Yes Annamaria Mendoza MD   Specialty Vitamins Products (MM BIOTIN/KERATIN PO) Take by mouth Yes Historical Provider, MD   Cholecalciferol (VITAMIN D3) 2000 UNITS TABS Take 2 tablets by mouth daily Yes Historical Provider, MD   Multiple Vitamins-Minerals (MULTIVITAMIN PO) Take 1 tablet by mouth daily Yes Historical Provider, MD   Omega-3 Fatty Acids (FISH OIL) 1000 MG CAPS Take 3,000 mg by mouth daily Yes Historical Provider, MD   POTASSIUM PO Take 1 tablet by mouth daily. Yes Historical Provider, MD   gabapentin (NEURONTIN) 300 MG capsule Take 1 capsule by mouth nightly for 30 days.  Intended supply: 90 days  CHOCO Oliver       Social History     Tobacco Use    Smoking status: Current Every Day Smoker     Packs/day: 1.00     Years: 22.00     Pack years: 22.00     Last attempt to quit: 2015     Years since quittin.0    Smokeless tobacco: Never Used    Tobacco comment: 9-10 per day   Substance Use Topics    Alcohol use: No     Alcohol/week: 0.0 standard drinks    Drug use: No        PHYSICAL EXAMINATION:  [ INSTRUCTIONS:  \"[x]\" Indicates a positive item  \"[]\" Indicates a negative item  -- DELETE ALL ITEMS NOT EXAMINED]  Vital Signs: (As obtained by patient/caregiver or practitioner observation)    Blood pressure-  Heart rate-    Respiratory rate-    Temperature-  Pulse oximetry-     Constitutional: [x] Appears well-developed and well-nourished [x] No apparent distress      [] Abnormal-   Mental status  [x] Alert and awake  [x] Oriented to person/place/time [x]Able to follow commands Eyes:  EOM    [x]  Normal  [] Abnormal-  Sclera  [x]  Normal  [] Abnormal -         Discharge [x]  None visible  [] Abnormal -    HENT:   [x] Normocephalic, atraumatic. [] Abnormal   [] Mouth/Throat: Mucous membranes are moist.     External Ears [] Normal  [] Abnormal-     Neck: [x] No visualized mass     Pulmonary/Chest: [x] Respiratory effort normal.  [x] No visualized signs of difficulty breathing or respiratory distress        [] Abnormal-      Musculoskeletal:   [] Normal gait with no signs of ataxia         [] Normal range of motion of neck        [] Abnormal-       Neurological:        [x] No Facial Asymmetry (Cranial nerve 7 motor function) (limited exam to video visit)          [] No gaze palsy        [] Abnormal-         Skin:        [x] No significant exanthematous lesions or discoloration noted on facial skin         [] Abnormal-            Psychiatric:       [x] Normal Affect [] No Hallucinations        [] Abnormal-     Other pertinent observable physical exam findings-     ASSESSMENT/PLAN:  There are no diagnoses linked to this encounter. Diagnosis Orders   1. Generalized edema  Comprehensive Metabolic Panel   2. SOBOE (shortness of breath on exertion)  D-Dimer, Quantitative    Brain Natriuretic Peptide    XR CHEST STANDARD (2 VW)   congrats on smoking cessation! Chest xray  Cmp, d-dimer, bnp today or tomorrow and decide on next step based on results  In meantime, double up lasix/ kcl to bid w/ restriction of dietary sodium       No follow-ups on file. Lo Nielson is a 72 y.o. female being evaluated by a Virtual Visit (video visit) encounter to address concerns as mentioned above. A caregiver was present when appropriate. Due to this being a TeleHealth encounter (During [facility] public health emergency), evaluation of the following organ systems was limited: Vitals/Constitutional/EENT/Resp/CV/GI//MS/Neuro/Skin/Heme-Lymph-Imm.   Pursuant to the emergency declaration under the

## 2020-05-28 ENCOUNTER — HOSPITAL ENCOUNTER (OUTPATIENT)
Dept: GENERAL RADIOLOGY | Age: 70
Discharge: HOME | End: 2020-05-28
Payer: MEDICARE

## 2020-05-28 ENCOUNTER — HOSPITAL ENCOUNTER (OUTPATIENT)
Age: 70
Discharge: HOME | End: 2020-05-28
Payer: MEDICARE

## 2020-05-28 LAB
A/G RATIO: 1.2 (ref 1.1–2.2)
ALBUMIN SERPL-MCNC: 4.2 G/DL (ref 3.4–5)
ALP BLD-CCNC: 90 U/L (ref 40–129)
ALT SERPL-CCNC: 15 U/L (ref 10–40)
ANION GAP SERPL CALCULATED.3IONS-SCNC: 10 MMOL/L (ref 3–16)
AST SERPL-CCNC: 14 U/L (ref 15–37)
BILIRUB SERPL-MCNC: 0.4 MG/DL (ref 0–1)
BUN BLDV-MCNC: 15 MG/DL (ref 7–20)
CALCIUM SERPL-MCNC: 9.2 MG/DL (ref 8.3–10.6)
CHLORIDE BLD-SCNC: 102 MMOL/L (ref 99–110)
CO2: 30 MMOL/L (ref 21–32)
CREAT SERPL-MCNC: <0.5 MG/DL (ref 0.6–1.2)
D DIMER: <200 NG/ML DDU (ref 0–229)
GFR AFRICAN AMERICAN: >60
GFR NON-AFRICAN AMERICAN: >60
GLOBULIN: 3.4 G/DL
GLUCOSE BLD-MCNC: 105 MG/DL (ref 70–99)
POTASSIUM SERPL-SCNC: 4.1 MMOL/L (ref 3.5–5.1)
PRO-BNP: 24 PG/ML (ref 0–124)
SODIUM BLD-SCNC: 142 MMOL/L (ref 136–145)
TOTAL PROTEIN: 7.6 G/DL (ref 6.4–8.2)

## 2020-05-28 PROCEDURE — 83880 ASSAY OF NATRIURETIC PEPTIDE: CPT

## 2020-05-28 PROCEDURE — 71046 X-RAY EXAM CHEST 2 VIEWS: CPT

## 2020-05-28 PROCEDURE — 80053 COMPREHEN METABOLIC PANEL: CPT

## 2020-05-28 PROCEDURE — 36415 COLL VENOUS BLD VENIPUNCTURE: CPT

## 2020-05-28 PROCEDURE — 85379 FIBRIN DEGRADATION QUANT: CPT

## 2020-05-29 ENCOUNTER — TELEPHONE (OUTPATIENT)
Dept: FAMILY MEDICINE CLINIC | Age: 70
End: 2020-05-29

## 2020-05-29 NOTE — TELEPHONE ENCOUNTER
stable   renal function. GFR  >60  >60 Final 05/28/2020  9:10 AM [facility]   Chronic Kidney Disease: less than 60 ml/min/1.73 sq. m.         Kidney Failure: less than 15 ml/min/1.73 sq.m. Results valid for patients 18 years and older.     Calcium 9.2  8.3 - 10.6 mg/dL Final 05/28/2020  9:10 AM [facility]   Total Protein 7.6  6.4 - 8.2 g/dL Final 05/28/2020  9:10 AM [facility]   Alb 4.2  3.4 - 5.0 g/dL Final 05/28/2020  9:10 AM [facility]   Albumin/Globulin Ratio 1.2  1.1 - 2.2 Final 05/28/2020  9:10 AM [facility]   Total Bilirubin 0.4  0.0 - 1.0 mg/dL Final 05/28/2020  9:10 AM [facility]   Alkaline Phosphatase 90  40 - 129 U/L Final 05/28/2020  9:10 AM [facility]   ALT 15  10 - 40 U/L Final 05/28/2020  9:10 AM [facility]   AST 14Low   15 - 37 U/L Final 05/28/2020  9:10 AM [facility]   Globulin 3.4  g/dL Final 05/28/2020  9:10 AM  [facility]

## 2020-06-18 ENCOUNTER — OFFICE VISIT (OUTPATIENT)
Dept: ORTHOPEDIC SURGERY | Age: 70
End: 2020-06-18
Payer: MEDICARE

## 2020-06-18 VITALS
BODY MASS INDEX: 36.27 KG/M2 | HEIGHT: 62 IN | DIASTOLIC BLOOD PRESSURE: 71 MMHG | SYSTOLIC BLOOD PRESSURE: 136 MMHG | WEIGHT: 197.09 LBS

## 2020-06-18 PROCEDURE — 3017F COLORECTAL CA SCREEN DOC REV: CPT | Performed by: PHYSICAL MEDICINE & REHABILITATION

## 2020-06-18 PROCEDURE — 99213 OFFICE O/P EST LOW 20 MIN: CPT | Performed by: PHYSICAL MEDICINE & REHABILITATION

## 2020-06-18 PROCEDURE — 4040F PNEUMOC VAC/ADMIN/RCVD: CPT | Performed by: PHYSICAL MEDICINE & REHABILITATION

## 2020-06-18 PROCEDURE — 4004F PT TOBACCO SCREEN RCVD TLK: CPT | Performed by: PHYSICAL MEDICINE & REHABILITATION

## 2020-06-18 PROCEDURE — 1090F PRES/ABSN URINE INCON ASSESS: CPT | Performed by: PHYSICAL MEDICINE & REHABILITATION

## 2020-06-18 PROCEDURE — G8427 DOCREV CUR MEDS BY ELIG CLIN: HCPCS | Performed by: PHYSICAL MEDICINE & REHABILITATION

## 2020-06-18 PROCEDURE — G8400 PT W/DXA NO RESULTS DOC: HCPCS | Performed by: PHYSICAL MEDICINE & REHABILITATION

## 2020-06-18 PROCEDURE — 1123F ACP DISCUSS/DSCN MKR DOCD: CPT | Performed by: PHYSICAL MEDICINE & REHABILITATION

## 2020-06-18 PROCEDURE — G8417 CALC BMI ABV UP PARAM F/U: HCPCS | Performed by: PHYSICAL MEDICINE & REHABILITATION

## 2020-06-18 NOTE — LETTER
Please schedule the following with:     Date:   20 @ 2:10    Account: [id number]  Patient: Sahil Harvey    : 1950  Address:  [address] [facility] [address]    Phone (H):  [phone] (home)      ----------------------------------------------------------------------------------------------  Diagnosis:     ICD-10-CM    1. Cervical radiculopathy M54.12    2. DDD (degenerative disc disease), cervical M50.30    3. Cervical spondylosis without myelopathy M47.812          Levels:C6/7 Interlaminar BRIE  CPT Codes 66832    ----------------------------------------------------------------------------------------------  Injection # 1   [facility]    Attending Physician       Nilo Lorenzo. Cyndie Glass MD.      ----------------------------------------------------------------------------------------------  Injection Scheduled For:    At:    [address] [place] [address]    Pre-Cert#    [id number]  Pre-Cert#    Comments or Special instructions:    · Infection control  ? Tested positive for MRSA in past 12 months:  no  ? Tested positive for MSSA \"staph infection\" in past 12 months: no  ? Tested positive for VRE (Vancomycin Resistant Enterococci) in past 12 months:   no  ? Currently on any antibiotics for an infection: no  · Anticoagulants:  ? On a blood thinner:  no   ?  Any history of bleeding disorder: no   · Advanced Liver disease: no   · Advanced Renal disease: no   · Glaucoma: no   · Diabetes: no      Sedation:         yes  -----------------------------------------------------------------------------------------------  Allergies   Allergen Reactions    Codeine     Prednisone Nausea Only, Palpitations and Other (See Comments)     Dizziness, swollen eye,heart paplitations

## 2020-06-18 NOTE — PROGRESS NOTES
not show any tenderness, deformity or injury. Range of motion is normal and pain-free. There is no gross instability. There are no rashes, ulcerations or lesions. Strength and tone are normal. No atrophy or abnormal movements are noted. · LEFT LOWER EXTREMITY:  Inspection/examination of the left lower extremity does not show any tenderness, deformity or injury. Range of motion is normal and pain-free. There is no gross instability. There are no rashes, ulcerations or lesions. Strength and tone are normal. No atrophy or abnormal movements are noted. Diagnostic Testing:    MR cervical spine shows 3/10/20:  Impression   Mild reversal of the cervical curvature.  Disc and osteophytes result in   narrowing of the neural foramina at C3-C4, C4-C5, C5-C6, and C6-C7 with mild   stenosis of the thecal sac as discussed above. Results for orders placed or performed during the hospital encounter of 05/28/20   Comprehensive Metabolic Panel   Result Value Ref Range    Sodium 142 136 - 145 mmol/L    Potassium 4.1 3.5 - 5.1 mmol/L    Chloride 102 99 - 110 mmol/L    CO2 30 21 - 32 mmol/L    Anion Gap 10 3 - 16    Glucose 105 (H) 70 - 99 mg/dL    BUN 15 7 - 20 mg/dL    CREATININE <0.5 (L) 0.6 - 1.2 mg/dL    GFR Non-African American >60 >60    GFR African American >60 >60    Calcium 9.2 8.3 - 10.6 mg/dL    Total Protein 7.6 6.4 - 8.2 g/dL    Alb 4.2 3.4 - 5.0 g/dL    Albumin/Globulin Ratio 1.2 1.1 - 2.2    Total Bilirubin 0.4 0.0 - 1.0 mg/dL    Alkaline Phosphatase 90 40 - 129 U/L    ALT 15 10 - 40 U/L    AST 14 (L) 15 - 37 U/L    Globulin 3.4 g/dL   D-Dimer, Quantitative   Result Value Ref Range    D-Dimer, Quant <200 0 - 229 ng/mL DDU   Brain Natriuretic Peptide   Result Value Ref Range    Pro-BNP 24 0 - 124 pg/mL     Impression:       1. Cervical radiculopathy    2. DDD (degenerative disc disease), cervical    3.  Cervical spondylosis without myelopathy        Plan:  Clinical Course: Above diagnoses are worsening    I

## 2020-06-30 NOTE — PROGRESS NOTES
PATIENT REACHED   YES____NO__X__    PREOP INSTUCTIONS LEFT ON  [id number]_[phone]______________  Patient instructed to get their COVID-19 test done as directed by their doctor (5-7 days prior to procedure)  or patient states will get on __7/2/20________. I The day the COVID test is done is considered day one. Instructed to self quarantine after test until DOS. There is a one visitor policy at [facility] for the pre-op phase only for surgery and endoscopy cases. The visitor is expected to leave the facility after the patient is taken back for the procedure. Pain management is NO VISITOR policyThe patients ride is expected to remain in the car with a cell phone for communication. If the ride is leaving the hospital grounds please make sure they are back in time for pickup. Have the patient inform the staff on arrival what their rides plans are while the patient is in the facility. At the [facility] there is one visitor allowed. Please note that the visitor policy is subject to change. DATE_7/8/20________ TIME___0910______ARRIVAL__0810______PLACE__masc__________  NOTHING TO EAT OR DRINK  AFTER MIDNIGHT THE EVENING PRIOR OR AS INSTRUCTED BY YOUR DR.  Sarita Melissa NEED A RESPONSIBLE ADULT AGE 18 OR OLDER TO DRIVE YOU HOME  PLEASE BRING INSURANCE CARD. PICTURE ID AND COMPLETE LIST OF MEDS  WEAR LOOSE COMFORTABLE CLOTHING  FOLLOW ANY INSTRUCTIONS YOUR DRS OFFICE HAS GIVEN YOU,INCLUDING WHAT MEDICATIONS TO TAKE THE AM OF PROCEDURE AND WHEN AND IF YOU NEED TO STOP ANY BLOOD THINNERS. IF YOU HAVE QUESTIONS REGARDING THIS CALL THE OFFICE  THE GOAL BLOOD SUGAR THE AM OF PROCEDURE  OR LESS ABOVE THAT THE PROCEDURE MAY BE CANCELLED  ANY QUESTIONS CALL YOUR DOCTOR. ALSO,PLEASE READ THE INSTRUCTION PACKET FROM YOUR DR IF YOU RECEIVED ONE.   SPINE INTERVENTION NUMBER [phone]

## 2020-07-02 ENCOUNTER — OFFICE VISIT (OUTPATIENT)
Dept: PRIMARY CARE CLINIC | Age: 70
End: 2020-07-02
Payer: MEDICARE

## 2020-07-02 LAB — SARS-COV-2, NAAT: NOT DETECTED

## 2020-07-02 PROCEDURE — 99211 OFF/OP EST MAY X REQ PHY/QHP: CPT | Performed by: NURSE PRACTITIONER

## 2020-07-02 PROCEDURE — G8428 CUR MEDS NOT DOCUMENT: HCPCS | Performed by: NURSE PRACTITIONER

## 2020-07-02 PROCEDURE — G8417 CALC BMI ABV UP PARAM F/U: HCPCS | Performed by: NURSE PRACTITIONER

## 2020-07-08 ENCOUNTER — HOSPITAL ENCOUNTER (OUTPATIENT)
Age: 70
Setting detail: OUTPATIENT SURGERY
Discharge: HOME | End: 2020-07-08
Attending: PHYSICAL MEDICINE & REHABILITATION | Admitting: PHYSICAL MEDICINE & REHABILITATION
Payer: MEDICARE

## 2020-07-08 ENCOUNTER — APPOINTMENT (OUTPATIENT)
Dept: GENERAL RADIOLOGY | Age: 70
End: 2020-07-08
Attending: PHYSICAL MEDICINE & REHABILITATION
Payer: MEDICARE

## 2020-07-08 VITALS
RESPIRATION RATE: 18 BRPM | BODY MASS INDEX: 36.99 KG/M2 | HEART RATE: 72 BPM | HEIGHT: 62 IN | WEIGHT: 201 LBS | DIASTOLIC BLOOD PRESSURE: 65 MMHG | TEMPERATURE: 97.5 F | OXYGEN SATURATION: 98 % | SYSTOLIC BLOOD PRESSURE: 135 MMHG

## 2020-07-08 PROCEDURE — 99152 MOD SED SAME PHYS/QHP 5/>YRS: CPT | Performed by: PHYSICAL MEDICINE & REHABILITATION

## 2020-07-08 PROCEDURE — 2500000003 HC RX 250 WO HCPCS: Performed by: PHYSICAL MEDICINE & REHABILITATION

## 2020-07-08 PROCEDURE — 3610000054 HC PAIN LEVEL 3 BASE (NON-OR): Performed by: PHYSICAL MEDICINE & REHABILITATION

## 2020-07-08 PROCEDURE — 6360000002 HC RX W HCPCS: Performed by: PHYSICAL MEDICINE & REHABILITATION

## 2020-07-08 PROCEDURE — 77003 FLUOROGUIDE FOR SPINE INJECT: CPT

## 2020-07-08 PROCEDURE — 2709999900 HC NON-CHARGEABLE SUPPLY: Performed by: PHYSICAL MEDICINE & REHABILITATION

## 2020-07-08 PROCEDURE — 2580000003 HC RX 258: Performed by: PHYSICAL MEDICINE & REHABILITATION

## 2020-07-08 RX ORDER — SODIUM CHLORIDE 9 MG/ML
INJECTION INTRAVENOUS
Status: COMPLETED | OUTPATIENT
Start: 2020-07-08 | End: 2020-07-08

## 2020-07-08 RX ORDER — LIDOCAINE HYDROCHLORIDE 10 MG/ML
INJECTION, SOLUTION INFILTRATION; PERINEURAL
Status: COMPLETED | OUTPATIENT
Start: 2020-07-08 | End: 2020-07-08

## 2020-07-08 RX ORDER — BETAMETHASONE SODIUM PHOSPHATE AND BETAMETHASONE ACETATE 3; 3 MG/ML; MG/ML
INJECTION, SUSPENSION INTRA-ARTICULAR; INTRALESIONAL; INTRAMUSCULAR; SOFT TISSUE
Status: COMPLETED | OUTPATIENT
Start: 2020-07-08 | End: 2020-07-08

## 2020-07-08 RX ORDER — MIDAZOLAM HYDROCHLORIDE 1 MG/ML
INJECTION INTRAMUSCULAR; INTRAVENOUS
Status: COMPLETED | OUTPATIENT
Start: 2020-07-08 | End: 2020-07-08

## 2020-07-08 RX ORDER — FENTANYL CITRATE 50 UG/ML
INJECTION, SOLUTION INTRAMUSCULAR; INTRAVENOUS
Status: COMPLETED | OUTPATIENT
Start: 2020-07-08 | End: 2020-07-08

## 2020-07-08 ASSESSMENT — PAIN SCALES - GENERAL
PAINLEVEL_OUTOF10: 0
PAINLEVEL_OUTOF10: 0

## 2020-07-08 ASSESSMENT — PAIN DESCRIPTION - DESCRIPTORS: DESCRIPTORS: ACHING

## 2020-07-08 ASSESSMENT — PAIN - FUNCTIONAL ASSESSMENT: PAIN_FUNCTIONAL_ASSESSMENT: 0-10

## 2020-07-08 NOTE — H&P
HISTORY AND PHYSICAL/PRE-SEDATION ASSESSMENT    Patient:  Audrey Nick   :  1950  Medical Record No.:  [id number]   Date:  2020  Physician:  Teofilo Singh M.D. Facility: [address] [place] [address]    HISTORY OF PRESENT ILLNESS:                 The patient is a 72 y.o. female whom presents with neck and arm pain. Review of the imaging and physical exam of the patient confirmed the pre-procedure diagnosis. After a thorough discussion of risks, benefits and alternatives informed consent was obtained. Past Medical History:   Past Medical History:   Diagnosis Date    Delayed gastric emptying 3/27/2014    Hayfever     Hypertension     Hypothyroid 2011    Right knee pain 2013      Past Surgical History:     Past Surgical History:   Procedure Laterality Date    ADENOIDECTOMY      AS A CHILD    APPENDECTOMY      COLONOSCOPY      normal    EPIDURAL STEROID INJECTION N/A 2019    L4/L5 INTERLAMINAR EPIDURAL STEROID INJECTION WITH FLUOROSCOPY performed by Teofilo Singh MD at [facility] 75 ovaries    PAIN MANAGEMENT PROCEDURE N/A 2020    L4L5 INTERLAMINAR EPIDURAL STEROID INJECTION WITH FLUOROSCOPY performed by Teofilo Singh MD at [address] [place] [address]    WISDOM TOOTH EXTRACTION       Current Medications:   Prior to Admission medications    Medication Sig Start Date End Date Taking?  Authorizing Provider   potassium chloride (KLOR-CON M) 10 MEQ extended release tablet Take 1 tablet by mouth daily as needed (w// lasix for swelling) 20  Yes Brice Abbasi MD   tiZANidine (ZANAFLEX) 4 MG tablet Take 1 tablet by mouth nightly as needed (spasms) 20  Yes Brice Abbasi MD   meloxicam [facility]) 15 MG tablet Take 1 tablet by mouth daily 20  Yes Brice Abbasi MD   omeprazole (PRILOSEC) 40 MG delayed release capsule TAKE 1 CAPSULE EVERY DAY 20  Yes Brice Abbasi MD   thyroid [facility] THYROID) 120 MG tablet TAKE 1 TABLET EVERY DAY 4/23/20  Yes Edward Barrett MD   lisinopril-hydroCHLOROthiazide MON FND \[facility]\"" - [facility]) 20-25 MG per tablet TAKE 1 TABLET EVERY DAY 4/23/20  Yes Edward Barrett MD   Specialty Vitamins Products (MM BIOTIN/KERATIN PO) Take by mouth   Yes Historical Provider, MD   Multiple Vitamins-Minerals (MULTIVITAMIN PO) Take 1 tablet by mouth daily   Yes Historical MD Rufina   POTASSIUM PO Take 1 tablet by mouth daily. Yes Historical Provider, MD   furosemide (LASIX) 20 MG tablet Take 1 tablet by mouth daily as needed (swelling) 5/4/20   Edward Barrett MD   estradiol (ESTRACE) 0.5 MG tablet Take 1 tablet by mouth daily 4/23/20   Edward Barrett MD   Magnesium 200 MG CHEW Take by mouth    Historical Provider, MD   calcipotriene (DOVONEX) 0.005 % cream Apply topically 2 times daily. 7/24/19   Edward Barrett MD   Cholecalciferol (VITAMIN D3) 2000 UNITS TABS Take 2 tablets by mouth daily    Historical Provider, MD   Omega-3 Fatty Acids (FISH OIL) 1000 MG CAPS Take 3,000 mg by mouth daily    Historical Provider, MD     Allergies:  Codeine and Prednisone  Social History:    reports that she has been smoking. She has a 22.00 pack-year smoking history. She has never used smokeless tobacco. She reports that she does not drink alcohol or use drugs. Family History:   Family History   Problem Relation Age of Onset    Dementia Mother     Mental Illness Mother     High Blood Pressure Mother     Heart Disease Father     High Blood Pressure Father     Stroke Maternal Grandmother     Heart Disease Other         dad's family       Vitals: Blood pressure (!) 158/82, pulse 77, temperature 97.5 °F (36.4 °C), temperature source Temporal, resp. rate 16, height 5' 2\" (1.575 m), weight 201 lb (91.2 kg), SpO2 99 %, not currently breastfeeding. PHYSICAL EXAM:including affected areas  HENT: Airway patent and reviewed  Cardiovascular: Normal rate, regular rhythm, normal heart sounds. Pulmonary/Chest: No wheezes. No rhonchi.  No rales.   Abdominal: Soft. Bowel sounds are normal. No distension. Extremities: Moves all extremities equally  Cervical and Lumbar Spine: Painful range of motion, no midline tenderness       Diagnosis:Cervical radiculopathy  M54.12  M50.30  M47.812    Plan: Proceed with planned procedure      ASA CLASS:         []   I. Normal, healthy adult           [x]   II.  Mild systemic disease            []   III. Severe systemic disease      Mallampati: Mallampati Class II - (soft palate, fauces & uvula are visible)      Sedation plan:   [x]  Local              []  Minimal                  []  General anesthesia    Patient's condition acceptable for planned procedure/sedation. Post Procedure Plan   Return to same level of care   ______________________     The patient was counseled at length about the risks of ciara Covid-19 in the ashlee-operative and post-operative states including the recovery window of their procedure. The patient was made aware that ciara Covid-19 after a surgical procedure may worsen their prognosis for recovering from the virus and lend to a higher morbidity and or mortality risk. The patient was given the options of postponing their procedure. All of the risks, benefits, and alternatives were discussed. The patient does wish to proceed with the procedure. The risks and benefits as well as alternatives to the procedure have been discussed with the patient and or family. The patient and or next of kin understands and agrees to proceed.     Alli Alejandre M.D.

## 2020-07-08 NOTE — OP NOTE
Patient:  Amelia Recinos  YOB: 1950  Medical Record #:  [id number]   Place: [address] [place] [address]  Date:  7/8/2020   Physician:  Kamar Smalls MD LEYDA    Procedure:  Cervical Epidural Interlaminar Steroid Injection  C6 - C7    Pre-Procedure Diagnosis:  Cervical radiculopathy    Post-Procedure Diagnosis: Same    Sedation: Local with 1% Lidocaine 3 ml and 1 mg of IV Versed    EBL: None    Complications: None    Procedure Summary:    The patient was seen in the office for complaints of neck and arm pain. Review of the imaging and physical exam of the patient confirmed the pre-procedure diagnosis. After a thorough discussion of risks, benefits and alternatives informed consent was obtained. The patient was brought to the procedure suite and placed in the prone position. The skin overlying the cervical spine was prepped and draped in the usual sterile fashion. Using fluoroscopic guidance, the C6 - C7 interlaminar space was identified. Through anesthetized skin, a 22 gauge Touhy needle was advanced into the epidural space using continuous loss of resistance to saline technique. Isovue M300 was instilled and an epidurogram was noted without evidence of vascular or intrathecal spread. Following which, 5 ml of a solution containing preservative free normal saline and 15 mg of betamethasone was instilled. The needle was removed and a band-aid applied. The patient was transferred to the post-operative suite in stable condition.

## 2020-07-23 ENCOUNTER — OFFICE VISIT (OUTPATIENT)
Dept: ORTHOPEDIC SURGERY | Age: 70
End: 2020-07-23
Payer: MEDICARE

## 2020-07-23 VITALS — WEIGHT: 201 LBS | RESPIRATION RATE: 12 BRPM | HEIGHT: 62 IN | TEMPERATURE: 97.3 F | BODY MASS INDEX: 36.99 KG/M2

## 2020-07-23 PROCEDURE — 3017F COLORECTAL CA SCREEN DOC REV: CPT | Performed by: PHYSICAL MEDICINE & REHABILITATION

## 2020-07-23 PROCEDURE — 4040F PNEUMOC VAC/ADMIN/RCVD: CPT | Performed by: PHYSICAL MEDICINE & REHABILITATION

## 2020-07-23 PROCEDURE — 4004F PT TOBACCO SCREEN RCVD TLK: CPT | Performed by: PHYSICAL MEDICINE & REHABILITATION

## 2020-07-23 PROCEDURE — 99213 OFFICE O/P EST LOW 20 MIN: CPT | Performed by: PHYSICAL MEDICINE & REHABILITATION

## 2020-07-23 PROCEDURE — 1123F ACP DISCUSS/DSCN MKR DOCD: CPT | Performed by: PHYSICAL MEDICINE & REHABILITATION

## 2020-07-23 PROCEDURE — G8427 DOCREV CUR MEDS BY ELIG CLIN: HCPCS | Performed by: PHYSICAL MEDICINE & REHABILITATION

## 2020-07-23 PROCEDURE — G8417 CALC BMI ABV UP PARAM F/U: HCPCS | Performed by: PHYSICAL MEDICINE & REHABILITATION

## 2020-07-23 PROCEDURE — 1090F PRES/ABSN URINE INCON ASSESS: CPT | Performed by: PHYSICAL MEDICINE & REHABILITATION

## 2020-07-23 PROCEDURE — G8400 PT W/DXA NO RESULTS DOC: HCPCS | Performed by: PHYSICAL MEDICINE & REHABILITATION

## 2020-07-23 NOTE — PROGRESS NOTES
Follow up: Minna Jose  1950  [id number]         Chief Complaint   Patient presents with    Neck Pain     F/u C6-C7 IL 7/8         HISTORY OF PRESENT ILLNESS:  Ms. Howard Zhao is a 72 y.o. female returns for a follow up visit for multiple medical problems. Her current presenting problems are   1. Cervical radiculopathy    2. DDD (degenerative disc disease), cervical    3. Cervical spondylosis without myelopathy    . As per information/history obtained from the PADT(patient assessment and documentation tool) - She complains of pain in the neck with radiation to the shoulders Right and arms Right She rates the pain 0/10 and describes it as N/A. Pain is made worse by: standing, lifting. She denies side effects from the current pain regimen. Patient reports that since the last follow up visit the physical functioning is better, family/social relationships are better, mood is better and sleep patterns are better, and that the overall functioning is better. Patient denies neurological bowel or bladder. The patient underwent a C6/C7 interlaminar epidural steroid injection on 7/8/2020. She reports 100% improvement following the procedure. The patient rates her pain 0/10 and does not report any symptoms. The pain does not radiate and the numbness she felt prior to the procedure has subsided. She states lifting intermittently causes pain, however this subsides within a few hours. She would like to try home exercises for the neck and shoulder. The patient can stand for 30 minutes and walk for 40-60 minutes without pain. Associated signs and symptoms:   Neurogenic bowel or bladder symptoms:  no   Perceived weakness:  no   Difficulty walking:  no            Past medical, surgical, social and family history reviewed with the patient.  No pertinent relevant history  Past Medical History:   Past Medical History:   Diagnosis Date    Delayed gastric emptying 3/27/2014    Hayfever     Hypertension  Hypothyroid 4/20/2011    Right knee pain 7/12/2013      Past Surgical History:     Past Surgical History:   Procedure Laterality Date    ADENOIDECTOMY      AS A CHILD    APPENDECTOMY      CERVICAL SPINE SURGERY N/A 7/8/2020    C6C7 INTERLAMINAR EPIDURAL STEROID INJECTION WITH FLUOROSCOPY performed by Damon Pitts MD at [facility]  2008    normal    EPIDURAL STEROID INJECTION N/A 4/19/2019    L4/L5 INTERLAMINAR EPIDURAL STEROID INJECTION WITH FLUOROSCOPY performed by Damon Pitts MD at [place] [address] ovaries    PAIN MANAGEMENT PROCEDURE N/A 1/22/2020    L4L5 INTERLAMINAR EPIDURAL STEROID INJECTION WITH FLUOROSCOPY performed by Damon Pitts MD at [address] [place] [address]    WISDOM TOOTH EXTRACTION       Current Medications:     Current Outpatient Medications:     furosemide (LASIX) 20 MG tablet, Take 1 tablet by mouth daily as needed (swelling), Disp: 10 tablet, Rfl: 0    potassium chloride (KLOR-CON M) 10 MEQ extended release tablet, Take 1 tablet by mouth daily as needed (w// lasix for swelling), Disp: 10 tablet, Rfl: 0    tiZANidine (ZANAFLEX) 4 MG tablet, Take 1 tablet by mouth nightly as needed (spasms), Disp: 30 tablet, Rfl: 5    meloxicam (MOBIC) 15 MG tablet, Take 1 tablet by mouth daily, Disp: 30 tablet, Rfl: 5    omeprazole (PRILOSEC) 40 MG delayed release capsule, TAKE 1 CAPSULE EVERY DAY, Disp: 90 capsule, Rfl: 3    thyroid (ARMOUR THYROID) 120 MG tablet, TAKE 1 TABLET EVERY DAY, Disp: 90 tablet, Rfl: 3    lisinopril-hydroCHLOROthiazide (PRINZIDE;ZESTORETIC) 20-25 MG per tablet, TAKE 1 TABLET EVERY DAY, Disp: 90 tablet, Rfl: 3    estradiol (ESTRACE) 0.5 MG tablet, Take 1 tablet by mouth daily, Disp: 90 tablet, Rfl: 3    Magnesium 200 MG CHEW, Take by mouth, Disp: , Rfl:     calcipotriene (DOVONEX) 0.005 % cream, Apply topically 2 times daily. , Disp: 1 Tube, Rfl: 5    Specialty Vitamins Products (MM BIOTIN/KERATIN PO), Take lower extremity does not show any tenderness, deformity or injury. Range of motion is normal and pain-free. There is no gross instability. There are no rashes, ulcerations or lesions. Strength and tone are normal. No atrophy or abnormal movements are noted. Diagnostic Testing:    MR cervical spine shows 3/10/20:  Impression    Mild reversal of the cervical curvature.  Disc and osteophytes result in    narrowing of the neural foramina at C3-C4, C4-C5, C5-C6, and C6-C7 with mild    stenosis of the thecal sac as discussed above. Results for orders placed or performed in visit on 20   COVID-19   Result Value Ref Range    SARS-CoV-2, NAAT Not Detected Not Detected     Impression:       1. Cervical radiculopathy    2. DDD (degenerative disc disease), cervical    3. Cervical spondylosis without myelopathy        Plan:  Clinical Course: Above diagnoses are improving     I discussed the diagnosis and the treatment options with Jorge Isaac today. In Summary:  The various treatment options were outlined and discussed with Jorge Isaac including:  Conservative care options: physical therapy, ice, medications, bracing, and activity modification. The indications for therapeutic injections. The indications for additional imaging/laboratory studies. The indications for (possible future) interventions. After considering the various options discussed, Jorge Gray elected to pursue a course of treatment that includes the followin. Medications:  No further recommendations for new medications. 2. PT:  Prescribed home exercise program.    3. Further studies: No further studies. 4. Interventional:  Significant improvement following cervical epidural steroid injection. No further intervention at this time. 5. Follow up:  Patient will call as needed.        Jorge Gray was instructed to call the office if her symptoms worsen or if new symptoms appear prior to the next scheduled visit. She is specifically instructed to contact the office between now & her scheduled appointment if she has concerns related to her condition or if she needs assistance in scheduling the above tests. She is welcome to call for an appointment sooner if she has any additional concerns or questions. Vanessa CHOUDHURY, am scribing for Dr. Ana Hale.  07/23/20 11:17 AM Vanessa Bourgeois. The physical examination was performed between the patient and Dr. Ana Hale. All counseling during the appointment was performed between the patient and the provider. I, Dr. Ana Hale, personally performed the services described in this documentation as scribed by Vanessa Mahoney ATC in my presence and it is both accurate and complete. Devin Matthews MD, LEYDA, [facility]  Board Certified in [address] [place] [address]  Certified and Fellowship Trained in [facility] ([facility])             This dictation was performed with a verbal recognition program [facility]) and it was checked for errors. It is possible that there are still dictated errors within this office note. If so, please bring any errors to my attention for an addendum. All efforts were made to ensure that this office note is accurate.

## 2020-08-05 ENCOUNTER — TELEPHONE (OUTPATIENT)
Dept: ORTHOPEDIC SURGERY | Age: 70
End: 2020-08-05

## 2020-08-05 NOTE — TELEPHONE ENCOUNTER
Patient last seen 7/23/20. Please call to schedule an office visit  1. Medications:  No further recommendations for new medications.     2. PT:  Prescribed home exercise program.     3. Further studies: No further studies.       4. Interventional:  Significant improvement following cervical epidural steroid injection. No further intervention at this time.      5.  Follow up:  Patient will call as needed

## 2020-08-05 NOTE — TELEPHONE ENCOUNTER
S/w patient. She states her neck is doing okay but her right shoulder and arm pain continues to be bothersome. She is wanting to try physical therapy. ATC will place order and fax to location in [place] she wishes to go to. Please advise if okay to refer patient to physical therapy at this time.

## 2020-08-05 NOTE — TELEPHONE ENCOUNTER
L/m on patient's v/m stating PT order has been approved by provider. Order has been faxed to [facility]. Fax number provided by patient of [phone]. She was asked to call back with further questions.

## 2020-09-16 NOTE — PROGRESS NOTES
Name_______________________________________Printed:____________________  Date and time of surgery___9/23/2020  1000_____________________Arrival Time:____0830  FEC____________   1. The instructions given regarding when and if a patient needs to stop oral intake prior to surgery varies. Follow the specific instructions you were given                  ___Nothing to eat or to drink after Midnight the night before. XXX____Endoscopy patient follow your DRS instructions-generally you will be doing a part of the prep after Midnight                   ____Carbo loading or ERAS instructions will be given to select patients-if you have been given those instructions -please do the following                           The evening before your surgery after dinner before midnight drink 40 ounces of gatorade. If you are diabetic use sugar free. The morning of surgery drink 40 ounces of water. This needs to be finished 3 hours prior to your surgery start time. 2. Take the following pills with a small sip of water on the morning of surgery___lisinopril-hctz, armour thyroid____l___________________________________________                 3. Aspirin, Ibuprofen, Advil, Naproxen, Vitamin E and other Anti-inflammatory products and supplements should be stopped for 5 -7days before surgery or as directed by your physician. 4. Check with your Doctor regarding stopping Plavix, Coumadin,Eliquis, Lovenox,Effient,Pradaxa,Xarelto, Fragmin or other blood thinners and follow their instructions. 5. Do not smoke, and do not drink any alcoholic beverages 24 hours prior to surgery. This includes NA Beer. Refrain from the usage of any recreational drugs. 6. You may brush your teeth and gargle the morning of surgery. DO NOT SWALLOW WATER   7. You MUST make arrangements for a responsible adult to stay on site while you are here and take you home after your surgery.  You will not be allowed to leave alone or drive yourself home.  It is strongly suggested someone stay with you the first 24 hrs. Your surgery will be cancelled if you do not have a ride home. 8. A parent/legal guardian must accompany a child scheduled for surgery and plan to stay at the hospital until the child is discharged. Please do not bring other children with you. 9. Please wear simple, loose fitting clothing to the hospital.  Shannon Khalil not bring valuables (money, credit cards, checkbooks, etc.) Do not wear any makeup (including no eye makeup) or nail polish on your fingers or toes. 10. DO NOT wear any jewelry or piercings on day of surgery. All body piercing jewelry must be removed. 11. If you have ___dentures, they will be removed before going to the OR; we will provide you a container. If you wear ___contact lenses or ___glasses, they will be removed; please bring a case for them. 12. Please see your family doctor/pediatrician for a history & physical and/or concerning medications. Bring any test results/reports from your physician's office. PCP__________________Phone___________H&P Appt. Date________             13 If you  have a Living Will and Durable Power of  for Healthcare, please bring in a copy. 15. Notify your Surgeon if you develop any illness between now and surgery  time, cough, cold, fever, sore throat, nausea, vomiting, etc.  Please notify your surgeon if you experience dizziness, shortness of breath or blurred vision between now & the time of your surgery             15. DO NOT shave your operative site 96 hours prior to surgery. For face & neck surgery, men may use an electric razor 48 hours prior to surgery. 16. Shower the night before or morning of surgery using an antibacterial soap or as you have been instructed. 17. To provide excellent care visitors will be limited to one in the room at any given time.              18.  Please bring picture ID and insurance card.             19.  Visit our web site for additional information:  [email or web address]              20.During flu season no children under the age of 15 are permitted in the hospital for the safety of all patients. 21. If you take a long acting insulin in the evening only  take half of your usual  dose the night  before your procedure              22. If you use a c-pap please bring DOS if staying overnight,             23.For your convenience [facility] has a pharmacy on site to fill your prescriptions. 24. If you use oxygen and have a portable tank please bring it  with you the DOS             25. Bring a complete list of all your medications with name and dose include any supplements. 26. Other__________________________________________   *Please call pre admission testing if you any further questions   [facility]   [address]    [facility] [address] [facility]  [phone]   [address] [place] [address]       All above information reviewed with patient in person or by phone. Patient verbalizes understanding. All questions and concerns addressed. Patient/Rep____________________                                                                                                                                    Patient instructed to get their COVID-19 test done as directed by their doctor (5-7 days prior to procedure)  or patient states will get on _9/17/2020  MFF_________. Patient was notified that they need to have an appointment,number to call provided. The day the COVID test is done is considered day one. Instructed to self quarantine after test until DOS. There is a one visitor policy at [facility] for all surgeries and endoscopies. Whether the visitor can stay or will be asked to wait in the car will depend on the current policy and if social distancing can be maintained. The policy is subject to change at any time. Please make sure the visitor has a cell phone that is on,charged and able to accept calls, as this may be the way that the staff communicates with them. Pain management is NO VISITOR policyThe patients ride is expected to remain in the car with a cell phone for communication. If the ride is leaving the hospital grounds please make sure they are back in time for pickup. Have the patient inform the staff on arrival what their rides plans are while the patient is in the facility. At the MAIN there is one visitor allowed. Please note that the visitor policy is subject to change.

## 2020-09-17 ENCOUNTER — OFFICE VISIT (OUTPATIENT)
Dept: PRIMARY CARE CLINIC | Age: 70
End: 2020-09-17
Payer: MEDICARE

## 2020-09-17 PROCEDURE — 99211 OFF/OP EST MAY X REQ PHY/QHP: CPT | Performed by: NURSE PRACTITIONER

## 2020-09-17 PROCEDURE — G8428 CUR MEDS NOT DOCUMENT: HCPCS | Performed by: NURSE PRACTITIONER

## 2020-09-17 PROCEDURE — G8417 CALC BMI ABV UP PARAM F/U: HCPCS | Performed by: NURSE PRACTITIONER

## 2020-09-17 NOTE — PATIENT INSTRUCTIONS

## 2020-09-18 LAB — SARS-COV-2, NAA: NOT DETECTED

## 2020-09-23 ENCOUNTER — ANESTHESIA (OUTPATIENT)
Dept: ENDOSCOPY | Age: 70
End: 2020-09-23
Payer: MEDICARE

## 2020-09-23 ENCOUNTER — ANESTHESIA EVENT (OUTPATIENT)
Dept: ENDOSCOPY | Age: 70
End: 2020-09-23
Payer: MEDICARE

## 2020-09-23 ENCOUNTER — HOSPITAL ENCOUNTER (OUTPATIENT)
Age: 70
Setting detail: OUTPATIENT SURGERY
Discharge: HOME | End: 2020-09-23
Attending: INTERNAL MEDICINE | Admitting: INTERNAL MEDICINE
Payer: MEDICARE

## 2020-09-23 VITALS
RESPIRATION RATE: 16 BRPM | HEART RATE: 76 BPM | HEIGHT: 62 IN | WEIGHT: 201 LBS | SYSTOLIC BLOOD PRESSURE: 135 MMHG | DIASTOLIC BLOOD PRESSURE: 62 MMHG | OXYGEN SATURATION: 97 % | TEMPERATURE: 97.2 F | BODY MASS INDEX: 36.99 KG/M2

## 2020-09-23 VITALS
DIASTOLIC BLOOD PRESSURE: 96 MMHG | RESPIRATION RATE: 20 BRPM | SYSTOLIC BLOOD PRESSURE: 147 MMHG | OXYGEN SATURATION: 98 %

## 2020-09-23 PROCEDURE — 3609010600 HC COLONOSCOPY POLYPECTOMY SNARE/COLD BIOPSY: Performed by: INTERNAL MEDICINE

## 2020-09-23 PROCEDURE — 3700000000 HC ANESTHESIA ATTENDED CARE: Performed by: INTERNAL MEDICINE

## 2020-09-23 PROCEDURE — 88305 TISSUE EXAM BY PATHOLOGIST: CPT

## 2020-09-23 PROCEDURE — 7100000010 HC PHASE II RECOVERY - FIRST 15 MIN: Performed by: INTERNAL MEDICINE

## 2020-09-23 PROCEDURE — 2500000003 HC RX 250 WO HCPCS: Performed by: NURSE ANESTHETIST, CERTIFIED REGISTERED

## 2020-09-23 PROCEDURE — 6360000002 HC RX W HCPCS: Performed by: NURSE ANESTHETIST, CERTIFIED REGISTERED

## 2020-09-23 PROCEDURE — 2709999900 HC NON-CHARGEABLE SUPPLY: Performed by: INTERNAL MEDICINE

## 2020-09-23 PROCEDURE — 6370000000 HC RX 637 (ALT 250 FOR IP): Performed by: INTERNAL MEDICINE

## 2020-09-23 PROCEDURE — C1726 CATH, BAL DIL, NON-VASCULAR: HCPCS | Performed by: INTERNAL MEDICINE

## 2020-09-23 PROCEDURE — 3700000001 HC ADD 15 MINUTES (ANESTHESIA): Performed by: INTERNAL MEDICINE

## 2020-09-23 PROCEDURE — 2580000003 HC RX 258: Performed by: INTERNAL MEDICINE

## 2020-09-23 PROCEDURE — 7100000011 HC PHASE II RECOVERY - ADDTL 15 MIN: Performed by: INTERNAL MEDICINE

## 2020-09-23 PROCEDURE — 3609017700 HC EGD DILATION GASTRIC/DUODENAL STRICTURE: Performed by: INTERNAL MEDICINE

## 2020-09-23 RX ORDER — LIDOCAINE HYDROCHLORIDE 20 MG/ML
INJECTION, SOLUTION EPIDURAL; INFILTRATION; INTRACAUDAL; PERINEURAL PRN
Status: DISCONTINUED | OUTPATIENT
Start: 2020-09-23 | End: 2020-09-23 | Stop reason: SDUPTHER

## 2020-09-23 RX ORDER — PROPOFOL 10 MG/ML
INJECTION, EMULSION INTRAVENOUS PRN
Status: DISCONTINUED | OUTPATIENT
Start: 2020-09-23 | End: 2020-09-23 | Stop reason: SDUPTHER

## 2020-09-23 RX ORDER — SODIUM CHLORIDE 9 MG/ML
INJECTION, SOLUTION INTRAVENOUS CONTINUOUS
Status: DISCONTINUED | OUTPATIENT
Start: 2020-09-23 | End: 2020-09-23 | Stop reason: HOSPADM

## 2020-09-23 RX ORDER — GLYCOPYRROLATE 0.2 MG/ML
INJECTION INTRAMUSCULAR; INTRAVENOUS PRN
Status: DISCONTINUED | OUTPATIENT
Start: 2020-09-23 | End: 2020-09-23 | Stop reason: SDUPTHER

## 2020-09-23 RX ADMIN — PROPOFOL 50 MG: 10 INJECTION, EMULSION INTRAVENOUS at 10:02

## 2020-09-23 RX ADMIN — SODIUM CHLORIDE: 9 INJECTION, SOLUTION INTRAVENOUS at 09:05

## 2020-09-23 RX ADMIN — PROPOFOL 50 MG: 10 INJECTION, EMULSION INTRAVENOUS at 10:04

## 2020-09-23 RX ADMIN — LIDOCAINE HYDROCHLORIDE 100 MG: 20 INJECTION, SOLUTION EPIDURAL; INFILTRATION; INTRACAUDAL; PERINEURAL at 09:57

## 2020-09-23 RX ADMIN — PROPOFOL 50 MG: 10 INJECTION, EMULSION INTRAVENOUS at 10:00

## 2020-09-23 RX ADMIN — PROPOFOL 50 MG: 10 INJECTION, EMULSION INTRAVENOUS at 10:15

## 2020-09-23 RX ADMIN — PROPOFOL 50 MG: 10 INJECTION, EMULSION INTRAVENOUS at 09:56

## 2020-09-23 RX ADMIN — PROPOFOL 50 MG: 10 INJECTION, EMULSION INTRAVENOUS at 09:58

## 2020-09-23 RX ADMIN — GLYCOPYRROLATE 0.2 MG: 0.2 INJECTION INTRAMUSCULAR; INTRAVENOUS at 09:51

## 2020-09-23 RX ADMIN — PROPOFOL 50 MG: 10 INJECTION, EMULSION INTRAVENOUS at 10:11

## 2020-09-23 RX ADMIN — PROPOFOL 50 MG: 10 INJECTION, EMULSION INTRAVENOUS at 10:07

## 2020-09-23 ASSESSMENT — PAIN - FUNCTIONAL ASSESSMENT: PAIN_FUNCTIONAL_ASSESSMENT: 0-10

## 2020-09-23 ASSESSMENT — PAIN SCALES - GENERAL
PAINLEVEL_OUTOF10: 0

## 2020-09-23 ASSESSMENT — LIFESTYLE VARIABLES: SMOKING_STATUS: 1

## 2020-09-23 NOTE — H&P
[address] [place] [address]    Pre-operative History and Physical    Patient: Delfino Solorzano  : 1950  CSN:     History Obtained From:  patient and/or guardian. HISTORY OF PRESENT ILLNESS:    The patient is a 72 y.o. female  here for colonoscopy. Past Medical History:    Past Medical History:   Diagnosis Date    Delayed gastric emptying 3/27/2014    Hayfever     Hypertension     Hypothyroid 2011    Right knee pain 2013    Sleep apnea     CPAP     Past Surgical History:    Past Surgical History:   Procedure Laterality Date    ADENOIDECTOMY      AS A CHILD    APPENDECTOMY      CERVICAL SPINE SURGERY N/A 2020    C6C7 INTERLAMINAR EPIDURAL STEROID INJECTION WITH FLUOROSCOPY performed by Heather Cho MD at [place] [address]    normal    EPIDURAL STEROID INJECTION N/A 2019    L4/L5 INTERLAMINAR EPIDURAL STEROID INJECTION WITH FLUOROSCOPY performed by Heather Cho MD at [place] [address] ovaries    PAIN MANAGEMENT PROCEDURE N/A 2020    L4L5 INTERLAMINAR EPIDURAL STEROID INJECTION WITH FLUOROSCOPY performed by Heather Cho MD at [address] [place] [address]      AS CHILD    WISDOM TOOTH EXTRACTION       Medications Prior to Admission:   No current facility-administered medications on file prior to encounter.       Current Outpatient Medications on File Prior to Encounter   Medication Sig Dispense Refill    furosemide (LASIX) 20 MG tablet Take 1 tablet by mouth daily as needed (swelling) 10 tablet 0    potassium chloride (KLOR-CON M) 10 MEQ extended release tablet Take 1 tablet by mouth daily as needed (w// lasix for swelling) 10 tablet 0    meloxicam (MOBIC) 15 MG tablet Take 1 tablet by mouth daily 30 tablet 5    omeprazole (PRILOSEC) 40 MG delayed release capsule TAKE 1 CAPSULE EVERY DAY 90 capsule 3    thyroid (ARMOUR THYROID) 120 MG tablet TAKE 1 TABLET EVERY DAY 90 tablet 3    lisinopril-hydroCHLOROthiazide visible  __________  Class IV: Hard palate only visible   __________        ASSESSMENT AND PLAN:    1. Patient is a 72 y.o. female here for colonoscopy with MAC.   2.  Procedure options, risks and benefits reviewed with patient. Patient expresses understanding.

## 2020-09-23 NOTE — ANESTHESIA PRE PROCEDURE
Department of Anesthesiology  Preprocedure Note       Name:  Torres Ray   Age:  72 y.o.  :  1950                                          MRN:  [id number]         Date:  2020      Surgeon: Danita Clark):  Zainab Jules MD    Procedure: Procedure(s):  EGD DIAGNOSTIC ONLY  COLONOSCOPY DIAGNOSTIC    Medications prior to admission:   Prior to Admission medications    Medication Sig Start Date End Date Taking? Authorizing Provider   furosemide (LASIX) 20 MG tablet Take 1 tablet by mouth daily as needed (swelling) 20   Magnus Nettles MD   potassium chloride (KLOR-CON M) 10 MEQ extended release tablet Take 1 tablet by mouth daily as needed (w// lasix for swelling) 20   Magnus Nettles MD   tiZANidine (ZANAFLEX) 4 MG tablet Take 1 tablet by mouth nightly as needed (spasms) 20   Magnus Nettles MD   meloxicam [facility]) 15 MG tablet Take 1 tablet by mouth daily 20   Magnus Nettles MD   omeprazole (PRILOSEC) 40 MG delayed release capsule TAKE 1 CAPSULE EVERY DAY 20   Magnus Nettles MD   thyroid [facility] THYROID) 120 MG tablet TAKE 1 TABLET EVERY DAY 20   Magnus Nettles MD   lisinopril-hydroCHLOROthiazide [facility]) 20-25 MG per tablet TAKE 1 TABLET EVERY DAY 20   Magnus Nettles MD   estradiol (ESTRACE) 0.5 MG tablet Take 1 tablet by mouth daily 20   Magnus Nettles MD   Magnesium 200 MG CHEW Take by mouth    Historical Provider, MD   calcipotriene (DOVONEX) 0.005 % cream Apply topically 2 times daily. 19   Magnus Nettles MD   Specialty Vitamins Products (MM BIOTIN/KERATIN PO) Take by mouth    Historical Provider, MD   Cholecalciferol (VITAMIN D3) 2000 UNITS TABS Take 2 tablets by mouth daily    Historical Provider, MD   Multiple Vitamins-Minerals (MULTIVITAMIN PO) Take 1 tablet by mouth daily    Historical Provider, MD   Omega-3 Fatty Acids (FISH OIL) 1000 MG CAPS Take 3,000 mg by mouth daily    Historical Provider, MD   POTASSIUM PO Take 1 tablet by mouth daily. Historical Provider, MD       Current medications:    No current facility-administered medications for this encounter. Allergies: Allergies   Allergen Reactions    Codeine     Prednisone Nausea Only, Palpitations and Other (See Comments)     Dizziness, swollen eye,heart paplitations       Problem List:    Patient Active Problem List   Diagnosis Code    Hypothyroid E03.9    Hypertension I10    Knee pain M25.569    Right knee pain M25.561    Delayed gastric emptying K30    Effusion of left knee M25.462       Past Medical History:        Diagnosis Date    Delayed gastric emptying 3/27/2014    Hayfever     Hypertension     Hypothyroid 2011    Right knee pain 2013       Past Surgical History:        Procedure Laterality Date    ADENOIDECTOMY      AS A CHILD    APPENDECTOMY      CERVICAL SPINE SURGERY N/A 2020    C6C7 INTERLAMINAR EPIDURAL STEROID INJECTION WITH FLUOROSCOPY performed by Angel Lindsey MD at [place] [address]    normal    EPIDURAL STEROID INJECTION N/A 2019    L4/L5 INTERLAMINAR EPIDURAL STEROID INJECTION WITH FLUOROSCOPY performed by Angel Lindsey MD at [place] [address] ovaries    PAIN MANAGEMENT PROCEDURE N/A 2020    L4L5 INTERLAMINAR EPIDURAL STEROID INJECTION WITH FLUOROSCOPY performed by Angel Lindsey MD at [address] [place] [address]    WISDOM TOOTH EXTRACTION         Social History:    Social History     Tobacco Use    Smoking status: Current Every Day Smoker     Packs/day: 1.00     Years: 22.00     Pack years: 22.00     Last attempt to quit: 2015     Years since quittin.3    Smokeless tobacco: Never Used    Tobacco comment: 9-10 per day   Substance Use Topics    Alcohol use: No     Alcohol/week: 0.0 standard drinks                                Ready to quit: Not Answered  Counseling given: Not Answered  Comment: 9-10 per day      Vital Signs (Current):  There were no vitals filed for this visit. BP Readings from Last 3 Encounters:   07/08/20 135/65   06/18/20 136/71   03/04/20 136/71       NPO Status:                                                                                 BMI:   Wt Readings from Last 3 Encounters:   07/23/20 201 lb (91.2 kg)   07/08/20 201 lb (91.2 kg)   06/18/20 197 lb 1.5 oz (89.4 kg)     There is no height or weight on file to calculate BMI.    CBC:   Lab Results   Component Value Date    WBC 9.8 07/24/2019    RBC 4.64 07/24/2019    HGB 13.4 07/24/2019    HCT 41.6 07/24/2019    MCV 89.8 07/24/2019    RDW 13.8 07/24/2019     07/24/2019       CMP:   Lab Results   Component Value Date     05/28/2020    K 4.1 05/28/2020     05/28/2020    CO2 30 05/28/2020    BUN 15 05/28/2020    CREATININE <0.5 05/28/2020    GFRAA >60 05/28/2020    GFRAA >60 11/28/2012    AGRATIO 1.2 05/28/2020    LABGLOM >60 05/28/2020    LABGLOM 102.0 06/09/2011    GLUCOSE 105 05/28/2020    GLUCOSE 106 06/09/2011    PROT 7.6 05/28/2020    PROT 6.8 11/28/2012    CALCIUM 9.2 05/28/2020    BILITOT 0.4 05/28/2020    ALKPHOS 90 05/28/2020    AST 14 05/28/2020    ALT 15 05/28/2020       POC Tests: No results for input(s): POCGLU, POCNA, POCK, POCCL, POCBUN, POCHEMO, POCHCT in the last 72 hours.     Coags: No results found for: PROTIME, INR, APTT    HCG (If Applicable): No results found for: PREGTESTUR, PREGSERUM, HCG, HCGQUANT     ABGs: No results found for: PHART, PO2ART, RLL3WQE, NMJ4YCD, BEART, I2BPBTJK     Type & Screen (If Applicable):  No results found for: LABABO, LABRH    Drug/Infectious Status (If Applicable):  No results found for: HIV, HEPCAB    COVID-19 Screening (If Applicable):   Lab Results   Component Value Date    COVID19 NOT DETECTED 09/17/2020         Anesthesia Evaluation  Patient summary reviewed and Nursing notes reviewed no history of anesthetic complications:   Airway: Mallampati: II  TM distance: >3 FB   Neck ROM:

## 2020-09-23 NOTE — ANESTHESIA POSTPROCEDURE EVALUATION
Department of Anesthesiology  Postprocedure Note    Patient: Christal Stack  MRN: [id number]  YOB: 1950  Date of evaluation: 9/23/2020  Time:  10:32 AM     Procedure Summary     Date:  09/23/20 Room / Location:  [address] [place] [address]    Anesthesia Start:  0193 Anesthesia Stop:  7555    Procedures:       EGD DILATION BALLOON (N/A Abdomen)      COLONOSCOPY POLYPECTOMY SNARE/COLD BIOPSY Diagnosis:       (DYSPEPSIA K30)      (SCREENING FOR COLON CANCER Z12.11)    Surgeon:  Rafael Masterson MD Responsible Provider:  Milvia Nelson MD    Anesthesia Type:  MAC ASA Status:  3          Anesthesia Type: MAC    Santiago Phase I: Santiago Score: 10    Santiago Phase II:      Last vitals: Reviewed and per EMR flowsheets.        Anesthesia Post Evaluation    Patient location during evaluation: PACU  Patient participation: complete - patient participated  Level of consciousness: awake and alert  Airway patency: patent  Nausea & Vomiting: no vomiting and no nausea  Complications: no  Cardiovascular status: hemodynamically stable  Respiratory status: acceptable  Hydration status: stable

## 2020-11-25 ENCOUNTER — OFFICE VISIT (OUTPATIENT)
Dept: ORTHOPEDIC SURGERY | Age: 70
End: 2020-11-25
Payer: MEDICARE

## 2020-11-25 VITALS — HEIGHT: 62 IN | WEIGHT: 201 LBS | BODY MASS INDEX: 36.99 KG/M2

## 2020-11-25 PROBLEM — M72.2 PLANTAR FASCIITIS OF LEFT FOOT: Status: ACTIVE | Noted: 2020-11-25

## 2020-11-25 PROBLEM — S92.422D: Status: ACTIVE | Noted: 2020-11-25

## 2020-11-25 PROCEDURE — 99203 OFFICE O/P NEW LOW 30 MIN: CPT | Performed by: ORTHOPAEDIC SURGERY

## 2020-11-25 PROCEDURE — 1123F ACP DISCUSS/DSCN MKR DOCD: CPT | Performed by: ORTHOPAEDIC SURGERY

## 2020-11-25 PROCEDURE — 1090F PRES/ABSN URINE INCON ASSESS: CPT | Performed by: ORTHOPAEDIC SURGERY

## 2020-11-25 PROCEDURE — 3017F COLORECTAL CA SCREEN DOC REV: CPT | Performed by: ORTHOPAEDIC SURGERY

## 2020-11-25 PROCEDURE — 4040F PNEUMOC VAC/ADMIN/RCVD: CPT | Performed by: ORTHOPAEDIC SURGERY

## 2020-11-25 PROCEDURE — G8428 CUR MEDS NOT DOCUMENT: HCPCS | Performed by: ORTHOPAEDIC SURGERY

## 2020-11-25 PROCEDURE — G8483 FLU IMM NO ADMIN DOC REA: HCPCS | Performed by: ORTHOPAEDIC SURGERY

## 2020-11-25 PROCEDURE — G8417 CALC BMI ABV UP PARAM F/U: HCPCS | Performed by: ORTHOPAEDIC SURGERY

## 2020-11-25 PROCEDURE — G8400 PT W/DXA NO RESULTS DOC: HCPCS | Performed by: ORTHOPAEDIC SURGERY

## 2020-11-25 PROCEDURE — 4004F PT TOBACCO SCREEN RCVD TLK: CPT | Performed by: ORTHOPAEDIC SURGERY

## 2020-11-25 NOTE — PROGRESS NOTES
Chief Complaint    New Patient (Left Foot - Medical Arch Pain/Spring Ligament)      History of Present Illness:  Hannah Faulkner is a 72 y.o. female for evaluation of chief complaint left heel pain. This started approximately back in September when she had an injury to her toe and saw a podiatrist who put her in a hard healed orthotic. He told her to continue trying with that but her heel pain got progressively worse and so she actually went back into a boot that he recommended for a distal phalanx of her right great toe. Symptoms are worse with weightbearing and throughout the day and better with rest. Patient endorses heel pain but denies numbness tingling. Treatment to date includes: Boot immobilization      Medical History:  Patient's medications, allergies, past medical, surgical, social and family histories were reviewed and updated as appropriate. Review of Systems:  Relevant review of systems reviewed and available in the patient's chart. They are negative or noncontributory except as per HPI. Vital Signs: There were no vitals filed for this visit. General: well-developed, well-nourished; adequately groomed  CV: RR  RESP: Respirations unlabored on RA  Skin: No rashes or ulcerations appreciated  Psych: appropriate mood and affect      Musculoskeletal:    Extremity: Left lower    Inspection: No swelling or ecchymosis    Palpation: No tenderness to palpation about the first IP joint.   Tenderness palpation over the anteromedial tubercle of the left calcaneus    Range of Motion: Tight hamstrings and gastroc    Stability: Ankle stable    Strength: 5 out of 5 plantar flexion dorsiflexion inversion eversion    Special Tests: Plantarflexion with Renee's test, positive Silfverskiold    Gait: Antalgic    Pulse/perfusion: 2+ dorsalis pedis pulse, foot warm and well perfused    Neurological:    Sensation: Sensation intact to light touch throughout     Reflexes: Deep tendon reflexes intact     Functional: the patient has good coordination and balance     Radiology:     X-rays obtained and reviewed in office:  Views AP lateral oblique of the left foot  Impression show healed fracture of the distal phalanx of the left great toe and spur consistent with plantar fasciitis, no acute osseous abnormality    Assessment : 36-year-old female with plantar fasciitis of the left heel as well as healed distal phalanx fracture of left great toe    Impression:  Encounter Diagnosis   Name Primary?  Plantar fasciitis of left foot Yes       Office Procedures:  Orders Placed This Encounter   Procedures    XR FOOT LEFT (MIN 3 VIEWS)     Standing Status:   Future     Number of Occurrences:   1     Standing Expiration Date:   11/25/2021       Treatment Plan:      I had a nice discussion with the patient today. I think that the heart orthotic certainly contributes commencement of her symptoms. To that end we will have her do heel cord stretches, a soft gel heel pad, and Aleve on a scheduled basis for 2 weeks. We will see how that works and I would like to see her back in about 4 to 6 weeks. If she is getting worse or having further problems I would like to see her back sooner.

## 2020-12-08 ENCOUNTER — TELEPHONE (OUTPATIENT)
Dept: ORTHOPEDIC SURGERY | Age: 70
End: 2020-12-08

## 2020-12-08 NOTE — TELEPHONE ENCOUNTER
PT NEEDS A STATEMENT OF THE DATES OF HER INJECTIONS FOR THIS YEAR. ALSO, PT WOULD LIKE TO SCHEDULE ANOTHER INJECTION. PLEASE CALL [phone].

## 2020-12-08 NOTE — TELEPHONE ENCOUNTER
S/w patient. All questions answered.  She has been scheduled for an ov, she is requesting repeat kaleb from January 2020

## 2020-12-09 ENCOUNTER — OFFICE VISIT (OUTPATIENT)
Dept: ORTHOPEDIC SURGERY | Age: 70
End: 2020-12-09
Payer: MEDICARE

## 2020-12-09 VITALS — TEMPERATURE: 96.7 F | HEIGHT: 62 IN | BODY MASS INDEX: 36.99 KG/M2 | RESPIRATION RATE: 12 BRPM | WEIGHT: 201 LBS

## 2020-12-09 PROCEDURE — 4004F PT TOBACCO SCREEN RCVD TLK: CPT | Performed by: STUDENT IN AN ORGANIZED HEALTH CARE EDUCATION/TRAINING PROGRAM

## 2020-12-09 PROCEDURE — 3017F COLORECTAL CA SCREEN DOC REV: CPT | Performed by: STUDENT IN AN ORGANIZED HEALTH CARE EDUCATION/TRAINING PROGRAM

## 2020-12-09 PROCEDURE — G8427 DOCREV CUR MEDS BY ELIG CLIN: HCPCS | Performed by: STUDENT IN AN ORGANIZED HEALTH CARE EDUCATION/TRAINING PROGRAM

## 2020-12-09 PROCEDURE — 1123F ACP DISCUSS/DSCN MKR DOCD: CPT | Performed by: STUDENT IN AN ORGANIZED HEALTH CARE EDUCATION/TRAINING PROGRAM

## 2020-12-09 PROCEDURE — 1090F PRES/ABSN URINE INCON ASSESS: CPT | Performed by: STUDENT IN AN ORGANIZED HEALTH CARE EDUCATION/TRAINING PROGRAM

## 2020-12-09 PROCEDURE — 99214 OFFICE O/P EST MOD 30 MIN: CPT | Performed by: STUDENT IN AN ORGANIZED HEALTH CARE EDUCATION/TRAINING PROGRAM

## 2020-12-09 PROCEDURE — G8417 CALC BMI ABV UP PARAM F/U: HCPCS | Performed by: STUDENT IN AN ORGANIZED HEALTH CARE EDUCATION/TRAINING PROGRAM

## 2020-12-09 PROCEDURE — G8483 FLU IMM NO ADMIN DOC REA: HCPCS | Performed by: STUDENT IN AN ORGANIZED HEALTH CARE EDUCATION/TRAINING PROGRAM

## 2020-12-09 PROCEDURE — 4040F PNEUMOC VAC/ADMIN/RCVD: CPT | Performed by: STUDENT IN AN ORGANIZED HEALTH CARE EDUCATION/TRAINING PROGRAM

## 2020-12-09 PROCEDURE — G8400 PT W/DXA NO RESULTS DOC: HCPCS | Performed by: STUDENT IN AN ORGANIZED HEALTH CARE EDUCATION/TRAINING PROGRAM

## 2020-12-09 PROCEDURE — 20610 DRAIN/INJ JOINT/BURSA W/O US: CPT | Performed by: STUDENT IN AN ORGANIZED HEALTH CARE EDUCATION/TRAINING PROGRAM

## 2020-12-09 RX ORDER — MELOXICAM 15 MG/1
15 TABLET ORAL DAILY
Qty: 30 TABLET | Refills: 0 | Status: SHIPPED | OUTPATIENT
Start: 2020-12-09 | End: 2021-03-03 | Stop reason: SDUPTHER

## 2020-12-09 RX ORDER — TIZANIDINE 4 MG/1
4 TABLET ORAL NIGHTLY
Qty: 30 TABLET | Refills: 0 | Status: SHIPPED | OUTPATIENT
Start: 2020-12-09 | End: 2021-01-08

## 2020-12-09 RX ORDER — TRIAMCINOLONE ACETONIDE 40 MG/ML
40 INJECTION, SUSPENSION INTRA-ARTICULAR; INTRAMUSCULAR ONCE
Status: COMPLETED | OUTPATIENT
Start: 2020-12-09 | End: 2020-12-09

## 2020-12-09 RX ADMIN — TRIAMCINOLONE ACETONIDE 40 MG: 40 INJECTION, SUSPENSION INTRA-ARTICULAR; INTRAMUSCULAR at 11:40

## 2020-12-09 NOTE — PROGRESS NOTES
12/9/20 11:22 AM          NDC: 0646-5194-88   -   LIDOCAINE 1%    Lot Number: [id number]       Comments: LEFT GT BURSA INJ

## 2020-12-09 NOTE — LETTER
Please schedule the following with:     Date:   20 @ 9:00    Account: [id number]  Patient: Hannah Faulkner    : 1950  Address:  [address] [facility]    Phone (H):  [phone] (home)      ----------------------------------------------------------------------------------------------  Diagnosis:     ICD-10-CM   1. Lumbar disc herniation  M51.26   2. Spondylolisthesis of lumbar region  M43.16   3. Greater trochanteric bursitis of left hip  M70.62       Levels:L4/L5  SideL Midline  Procedure: Interlaminar epidural steroid injection  CPT Codes 16242    ----------------------------------------------------------------------------------------------  Injection # 2  [facility]    Attending Physician       Judi Mcneill MD.  ----------------------------------------------------------------------------------------------  Injection Scheduled For:    At:    1st [employer] [facility]    Pre-Cert#    2nd Insurance [employer] [facility]  Pre-Cert#    Comments:  COVID TEST Needed: yes    · Infection control  · Tested positive for MRSA in past 12 months:  no  · Tested positive for MSSA \"staph infection\" in past 12 months: no  · Tested positive for VRE (Vancomycin Resistant Enterococci) in past 12 months:   no  · Currently on any antibiotics for an infection: no  · Anticoagulants:  · On a blood thinner:  no   · Any history of bleeding disorder: no   · Advanced Liver disease: no   · Advanced Renal disease: no   · Glaucoma: no   · Diabetes: no     Sedation:  No  -----------------------------------------------------------------------------------------------  Allergies   Allergen Reactions    Codeine     Prednisone Nausea Only, Palpitations and Other (See Comments)     Dizziness, swollen eye,heart paplitations

## 2020-12-09 NOTE — PROGRESS NOTES
Hayfever     Hypertension     Hypothyroid 4/20/2011    Right knee pain 7/12/2013    Sleep apnea     CPAP      Past Surgical History:     Past Surgical History:   Procedure Laterality Date    ADENOIDECTOMY      AS A CHILD    APPENDECTOMY      CERVICAL SPINE SURGERY N/A 7/8/2020    C6C7 INTERLAMINAR EPIDURAL STEROID INJECTION WITH FLUOROSCOPY performed by Josefa Reese MD at [facility]  2008    normal    COLONOSCOPY  9/23/2020    COLONOSCOPY POLYPECTOMY SNARE/COLD BIOPSY performed by Socorro Cruz MD at [address] N/A 4/19/2019    L4/L5 INTERLAMINAR EPIDURAL STEROID INJECTION WITH FLUOROSCOPY performed by Josefa Reese MD at [place] [address] ovaries    PAIN MANAGEMENT PROCEDURE N/A 1/22/2020    L4L5 INTERLAMINAR EPIDURAL STEROID INJECTION WITH FLUOROSCOPY performed by Josefa Reese MD at [facility] ENDOSCOPY N/A 9/23/2020    EGD DILATION BALLOON performed by Socorro Cruz MD at [address] [facility]       Current Medications:     Current Outpatient Medications:     tiZANidine (ZANAFLEX) 4 MG tablet, Take 1 tablet by mouth nightly, Disp: 30 tablet, Rfl: 0    meloxicam (MOBIC) 15 MG tablet, Take 1 tablet by mouth daily, Disp: 30 tablet, Rfl: 0    furosemide (LASIX) 20 MG tablet, Take 1 tablet by mouth daily as needed (swelling), Disp: 10 tablet, Rfl: 0    potassium chloride (KLOR-CON M) 10 MEQ extended release tablet, Take 1 tablet by mouth daily as needed (w// lasix for swelling), Disp: 10 tablet, Rfl: 0    tiZANidine (ZANAFLEX) 4 MG tablet, Take 1 tablet by mouth nightly as needed (spasms), Disp: 30 tablet, Rfl: 5    omeprazole (PRILOSEC) 40 MG delayed release capsule, TAKE 1 CAPSULE EVERY DAY, Disp: 90 capsule, Rfl: 3    thyroid (ARMOUR THYROID) 120 MG tablet, TAKE 1 TABLET EVERY DAY, Disp: 90 tablet, Rfl: 3    lisinopril-hydroCHLOROthiazide reveals no tenderness in upper or lower extremities. Good capillary refill. · The lymphatic examination of the neck, axillae and groin reveals all areas to be without enlargement or induration  · Sensation is intact without deficit in the upper and lower extremities to light touch and pinprick  · Coordination of the upper and lower extremities are normal.  · RIGHT UPPER EXTREMITY:  Inspection/examination of the right upper extremity does not show any tenderness, deformity or injury. Range of motion is unremarkable and pain-free. There is no gross instability. There are no rashes, ulcerations or lesions. Strength and tone are normal. No atrophy or abnormal movements are noted. · LEFT UPPER EXTREMITY: Inspection/examination of the left upper extremity does not show any tenderness, deformity or injury. Range of motion is unremarkable and pain-free. There is no gross instability. There are no rashes, ulcerations or lesions. Strength and tone are normal. No atrophy or abnormal movements are noted. LUMBAR/SACRAL EXAMINATION:  · Inspection: Local inspection shows no step-off or bruising. Lumbar alignment is normal. No instability is noted. · Palpation:   No evidence of tenderness at the midline. Lumbar paraspinal tenderness: Mild L4/5 and L5/S1 tenderness  Bursal tenderness Left greater trochanteric tenderness  There is no paraspinal spasm. · Range of Motion: limited by 25% in all planes due to pain  · Strength:   Strength testing is 5/5 in all muscle groups tested. · Special Tests:   Straight leg raise and crossed SLR negative. Skyler's testing is negative bilaterally. FADIR's testing is negative bilaterally. · Skin: There are no rashes, ulcerations or lesions. · Reflexes: Reflexes are symmetrically 1+ at the patellar and ankle tendons. Clonus absent bilaterally at the feet.   · Gait & station: normal, patient ambulates without assistance  · Additional Examinations:  · RIGHT LOWER EXTREMITY: Inspection/examination of the right lower extremity does not show any tenderness, deformity or injury. Range of motion is normal and pain-free. There is no gross instability. There are no rashes, ulcerations or lesions. Strength and tone are normal. No atrophy or abnormal movements are noted. · LEFT LOWER EXTREMITY:  Inspection/examination of the left lower extremity does not show any tenderness, deformity or injury. Range of motion is normal and pain-free. There is no gross instability. There are no rashes, ulcerations or lesions. Strength and tone are normal. No atrophy or abnormal movements are noted. Diagnostic Testing:    MR Lumbar spine shows   3/12/2019       L1-L2: There is no significant disc herniation, spinal canal stenosis or    neural foraminal narrowing.         L2-L3: There is no significant disc herniation, spinal canal stenosis or    neural foraminal narrowing.         L3-L4: There is anterolisthesis, disc bulge, mild bilateral facet arthrosis,    with mild spinal canal narrowing without foraminal narrowing.         L4-L5: There is disc bulge, annular fissure, with flattening of the ventral    thecal sac, minimal bilateral foraminal narrowing.  No spinal canal narrowing.         L5-S1: There is central disc protrusion without spinal canal narrowing. There is minimal bilateral foraminal narrowing. Results for orders placed or performed in visit on 09/17/20   COVID-19   Result Value Ref Range    SARS-CoV-2, FERMIN NOT DETECTED NOT DETECTED     Impression:       1. Lumbar disc herniation    2. Spondylolisthesis of lumbar region    3. Greater trochanteric bursitis of left hip        Plan:  Clinical Course: Above diagnoses are worsening    I discussed the diagnosis and the treatment options with Stewart Mccloud today.      In Summary:  The various treatment options were outlined and discussed with Stewart Mccloud including:  Conservative care options: physical therapy, ice, medications, bracing, and activity modification. The indications for therapeutic injections. The indications for additional imaging/laboratory studies. The indications for (possible future) interventions. After considering the various options discussed, Dennie Challenger elected to pursue a course of treatment that includes the followin. Medications: I will refill meloxicam 15 mg p.o. daily and Zanaflex 4 mg p.o. nightly. Nephro and GI risk reviewed. Instructed to take medication with food. Reviewed risks of operating heavy machinery with muscle relaxants. Patient voiced understanding. 2. PT:  Encouraged to continue with HEP. 3. Further studies:  COVID-19 PCR prior to procedure. 4. Interventional:  We discussed pursuing a L4/L5 interlaminar epidural steroid injection to address the pain. Radiologic imaging and symptoms confirm the pain etiology. Risks, benefits and alternatives of interventional options were discussed. These include and are not limited to bleeding, infection, spinal headache, nerve injury and lack of pain relief. The patient verbalized understanding and would like to proceed. The patient will be scheduled accordingly. After risks benefits alternatives discussed a left greater trochanter bursa injection was undertaken the bedside. The skin overlying the left hip was prepped with ChloraPrep ×3. The point of maximal tenderness over the left greater trochanteric bursa was identified and marked. A mixture of 80 mg of Kenalog with 3 ml of 1% lidocaine was drawn up and instilled over the most tender point of the left greater trochanter with a 22-gauge needle. The needle was removed after the mixture was instilled and a Band-Aid was applied      5. Follow up:  4-6 weeks      Dennie Challenger was instructed to call the office if her symptoms worsen or if new symptoms appear prior to the next scheduled visit.  She is specifically instructed to contact the office between now & her scheduled appointment if she has concerns related to her condition or if she needs assistance in scheduling the above tests. She is welcome to call for an appointment sooner if she has any additional concerns or questions. Ofelia Jon PA-C   Board Certified by the [employer] on Certification of Physician Assistants  Alyssa Gibson 59  Partner of [facility] ([facility])             This dictation was performed with a verbal recognition program [facility]) and it was checked for errors. It is possible that there are still dictated errors within this office note. If so, please bring any errors to my attention for an addendum. All efforts were made to ensure that this office note is accurate.

## 2020-12-14 ENCOUNTER — TELEPHONE (OUTPATIENT)
Dept: ORTHOPEDIC SURGERY | Age: 70
End: 2020-12-14

## 2020-12-17 NOTE — PROGRESS NOTES
PATIENT REACHED   YES_X    DATE_12/23/2020________ TIME__0900_______ARRIVAL__0800______PLACE__pain__________  NOTHING TO EAT OR DRINK  AFTER MIDNIGHT THE EVENING PRIOR OR AS INSTRUCTED BY YOUR DR.  Eda Carvalho NEED A RESPONSIBLE ADULT AGE 18 OR OLDER TO DRIVE YOU HOME  PLEASE BRING INSURANCE CARD. PICTURE ID AND COMPLETE LIST OF MEDS  WEAR LOOSE COMFORTABLE CLOTHING  FOLLOW ANY INSTRUCTIONS YOUR DRS OFFICE HAS GIVEN YOU,INCLUDING WHAT MEDICATIONS TO TAKE THE AM OF PROCEDURE AND WHEN AND IF YOU NEED TO STOP ANY BLOOD THINNERS. IF YOU HAVE QUESTIONS REGARDING THIS CALL THE OFFICE  THE GOAL BLOOD SUGAR THE AM OF PROCEDURE  OR LESS ABOVE THAT THE PROCEDURE MAY BE CANCELLED  ANY QUESTIONS CALL YOUR DOCTOR. ALSO,PLEASE READ THE INSTRUCTION PACKET FROM YOUR DR IF YOU RECEIVED ONE. SPINE INTERVENTION NUMBER [phone]    There is a one visitor policy at [facility] for all surgeries and endoscopies. Whether the visitor can stay or will be asked to wait in the car will depend on the current policy and if social distancing can be maintained. The policy is subject to change at any time. Please make sure the visitor has a cell phone that is on,charged and able to accept calls, as this may be the way that the staff communicates with them. Pain management is NO VISITOR policyThe patients ride is expected to remain in the car with a cell phone for communication. If the ride is leaving the hospital grounds please make sure they are back in time for pickup. Have the patient inform the staff on arrival what their rides plans are while the patient is in the facility. At the MAIN there is one visitor allowed. Please note that the visitor policy is subject to change.

## 2020-12-18 ENCOUNTER — OFFICE VISIT (OUTPATIENT)
Dept: PRIMARY CARE CLINIC | Age: 70
End: 2020-12-18
Payer: MEDICARE

## 2020-12-18 PROCEDURE — G8428 CUR MEDS NOT DOCUMENT: HCPCS | Performed by: NURSE PRACTITIONER

## 2020-12-18 PROCEDURE — 99211 OFF/OP EST MAY X REQ PHY/QHP: CPT | Performed by: NURSE PRACTITIONER

## 2020-12-18 PROCEDURE — G8417 CALC BMI ABV UP PARAM F/U: HCPCS | Performed by: NURSE PRACTITIONER

## 2020-12-18 NOTE — PATIENT INSTRUCTIONS
You have received a viral test for COVID-19. Below is education on quarantine per the CDC guidelines. For any symptoms, seek care from your PCP, call [phone] to establish care with a doctor, or go directly to an urgent care or the emergency room. Test results will take 2-7 days and will be sent to you in your [employer] account. If you test positive, you will be contacted via phone. If you test negative, the ONLY communication will be through [address] GO TO [employer] AND SIGN UP FOR [employer]  (LOWER LEFT OF THE HOME PAGE)  No test is 100%. If you have symptoms, you should follow the guidance of quarantine as previously stated. You can still be contagious if you have symptoms. Your [facility] will reach out to you if you have a positive result. They will provide you with a return to work date and note. If you were tested for a pre-op, then you should remain in quarantine until your procedure. How do I know if I need to be in quarantine? If you live in a community where COVID-19 is or might be spreading (currently, that is virtually everywhere in the [place])  Be alert for symptoms. Watch for fever, cough, shortness of breath, or other symptoms of COVID-19.  ? Take your temperature if symptoms develop. ? Practice social distancing. Maintain 6 feet of distance from others and stay out of crowded places. ? Follow CDC guidance if symptoms develop. If you feel healthy but:  ? Recently had close contact with a person with COVID-19 you need to Quarantine:  ? Stay home until 14 days after your last exposure. ? Check your temperature twice a day and watch for symptoms of COVID-19.  ? If possible, stay away from people who are at higher-risk for getting very sick from COVID-19. Stay Home and Monitor Your Health if you:  ? Have been diagnosed with COVID-19, or  ? Are waiting for test results, or  ?  Have cough, fever, or shortness of breath, or symptoms of COVID-19 When You Can be Around Others After You Had or Likely Had COVID-19     If you have or think you might have COVID-19, it is important to stay home and away from other people. Staying away from others helps stop the spread of COVID-19. If you have an emergency warning sign (including trouble breathing), get emergency medical care immediately. When you can be around others (end home isolation) depends on different factors for different situations. Find CDC's recommendations for your situation below. I think or know I had COVID-19, and I had symptoms  You can be with others after  ? 3 days with no fever and  ? Respiratory symptoms have improved (e.g. cough, shortness of breath) and  ? 10 days since symptoms first appeared  Depending on your healthcare provider's advice and availability of testing, you might get tested to see if you still have COVID-19. If you will be tested, you can be around others when you have no fever, respiratory symptoms have improved, and you receive two negative test results in a row, at least 24 hours apart. I tested positive for COVID-19 but had no symptoms  If you continue to have no symptoms, you can be with others after:  ? 10 days have passed since test or 14 days since your exposure test   Depending on your healthcare provider's advice and availability of testing, you might get tested to see if you still have COVID-19. If you will be tested, you can be around others after you receive two negative test results in a row, at least 24 hours apart. If you develop symptoms after testing positive, follow the guidance above for I think or know I had COVID, and I had symptoms.   For Anyone Who Has Been Around a Person with COVID-19  It is important to remember that anyone who has close contact with someone with COVID-19 should stay home for 14 days after exposure based on the time it takes to develop illness. Testing is not necessary.     www.cdc.gov/coronavirus/2019-ncov/index.html

## 2020-12-18 NOTE — PROGRESS NOTES
Stephanie Kern received a viral test for COVID-19. They were educated on isolation and quarantine as appropriate. For any symptoms, they were directed to seek care from their PCP, given contact information to establish with a doctor, directed to an urgent care or the emergency room.

## 2020-12-19 LAB — SARS-COV-2: NOT DETECTED

## 2020-12-23 ENCOUNTER — HOSPITAL ENCOUNTER (OUTPATIENT)
Age: 70
Setting detail: OUTPATIENT SURGERY
Discharge: HOME | End: 2020-12-23
Attending: PHYSICAL MEDICINE & REHABILITATION | Admitting: PHYSICAL MEDICINE & REHABILITATION
Payer: MEDICARE

## 2020-12-23 ENCOUNTER — APPOINTMENT (OUTPATIENT)
Dept: GENERAL RADIOLOGY | Age: 70
End: 2020-12-23
Attending: PHYSICAL MEDICINE & REHABILITATION
Payer: MEDICARE

## 2020-12-23 VITALS
TEMPERATURE: 97.2 F | HEIGHT: 62 IN | OXYGEN SATURATION: 99 % | SYSTOLIC BLOOD PRESSURE: 135 MMHG | HEART RATE: 78 BPM | WEIGHT: 193 LBS | RESPIRATION RATE: 16 BRPM | BODY MASS INDEX: 35.51 KG/M2 | DIASTOLIC BLOOD PRESSURE: 69 MMHG

## 2020-12-23 PROCEDURE — 2580000003 HC RX 258: Performed by: PHYSICAL MEDICINE & REHABILITATION

## 2020-12-23 PROCEDURE — 2709999900 HC NON-CHARGEABLE SUPPLY: Performed by: PHYSICAL MEDICINE & REHABILITATION

## 2020-12-23 PROCEDURE — 99152 MOD SED SAME PHYS/QHP 5/>YRS: CPT | Performed by: PHYSICAL MEDICINE & REHABILITATION

## 2020-12-23 PROCEDURE — 3610000054 HC PAIN LEVEL 3 BASE (NON-OR): Performed by: PHYSICAL MEDICINE & REHABILITATION

## 2020-12-23 PROCEDURE — 6360000002 HC RX W HCPCS: Performed by: PHYSICAL MEDICINE & REHABILITATION

## 2020-12-23 PROCEDURE — 77003 FLUOROGUIDE FOR SPINE INJECT: CPT

## 2020-12-23 PROCEDURE — 2500000003 HC RX 250 WO HCPCS: Performed by: PHYSICAL MEDICINE & REHABILITATION

## 2020-12-23 RX ORDER — MIDAZOLAM HYDROCHLORIDE 1 MG/ML
INJECTION INTRAMUSCULAR; INTRAVENOUS
Status: COMPLETED | OUTPATIENT
Start: 2020-12-23 | End: 2020-12-23

## 2020-12-23 RX ORDER — FENTANYL CITRATE 50 UG/ML
INJECTION, SOLUTION INTRAMUSCULAR; INTRAVENOUS
Status: COMPLETED | OUTPATIENT
Start: 2020-12-23 | End: 2020-12-23

## 2020-12-23 RX ORDER — LIDOCAINE HYDROCHLORIDE 10 MG/ML
INJECTION, SOLUTION INFILTRATION; PERINEURAL
Status: COMPLETED | OUTPATIENT
Start: 2020-12-23 | End: 2020-12-23

## 2020-12-23 RX ORDER — METHYLPREDNISOLONE ACETATE 80 MG/ML
INJECTION, SUSPENSION INTRA-ARTICULAR; INTRALESIONAL; INTRAMUSCULAR; SOFT TISSUE
Status: COMPLETED | OUTPATIENT
Start: 2020-12-23 | End: 2020-12-23

## 2020-12-23 RX ORDER — SODIUM CHLORIDE 9 MG/ML
INJECTION INTRAVENOUS
Status: COMPLETED | OUTPATIENT
Start: 2020-12-23 | End: 2020-12-23

## 2020-12-23 ASSESSMENT — PAIN DESCRIPTION - DESCRIPTORS: DESCRIPTORS: SORE

## 2020-12-23 ASSESSMENT — PAIN SCALES - GENERAL
PAINLEVEL_OUTOF10: 0
PAINLEVEL_OUTOF10: 0

## 2020-12-23 ASSESSMENT — PAIN - FUNCTIONAL ASSESSMENT
PAIN_FUNCTIONAL_ASSESSMENT: 0-10
PAIN_FUNCTIONAL_ASSESSMENT: ACTIVITIES ARE NOT PREVENTED

## 2020-12-23 NOTE — PROGRESS NOTES
Teaching / education initiated regarding perioperative experience, expectations, and pain management during stay. Patient verbalized understanding.   Dang Bowers RN

## 2020-12-23 NOTE — H&P
HISTORY AND PHYSICAL/PRE-SEDATION ASSESSMENT    Patient:  Kristin Swan   :  1950  Medical Record No.:  [id number]   Date:  2020  Physician:  Garrett Woodard M.D. Facility: [address] [place] [address]    HISTORY OF PRESENT ILLNESS:                 The patient is a 72 y.o. female whom presents with lower back and leg pain. Review of the imaging and physical exam of the patient confirmed the pre-procedure diagnosis. After a thorough discussion of risks, benefits and alternatives informed consent was obtained. Past Medical History:   Past Medical History:   Diagnosis Date    Delayed gastric emptying 3/27/2014    Hayfever     Hypertension     Hypothyroid 2011    Right knee pain 2013    Sleep apnea     CPAP      Past Surgical History:     Past Surgical History:   Procedure Laterality Date    ADENOIDECTOMY      AS A CHILD    APPENDECTOMY      CERVICAL SPINE SURGERY N/A 2020    C6C7 INTERLAMINAR EPIDURAL STEROID INJECTION WITH FLUOROSCOPY performed by Garrett Woodard MD at [place] [address]    normal    COLONOSCOPY  2020    COLONOSCOPY POLYPECTOMY SNARE/COLD BIOPSY performed by Salbador Green MD at [facility] N/A 2019    L4/L5 INTERLAMINAR EPIDURAL STEROID INJECTION WITH FLUOROSCOPY performed by Garrett Woodard MD at [place] [address] ovaries    PAIN MANAGEMENT PROCEDURE N/A 2020    L4L5 INTERLAMINAR EPIDURAL STEROID INJECTION WITH FLUOROSCOPY performed by Garrett Woodard MD at [address] [place] [address] ENDOSCOPY N/A 2020    EGD DILATION BALLOON performed by Salbador Green MD at [address] [place] [address]       Current Medications:   Prior to Admission medications    Medication Sig Start Date End Date Taking?  Authorizing Provider   tiZANidine (ZANAFLEX) 4 MG tablet Take 1 tablet by mouth nightly 20 Yes CHOCO Pan   potassium chloride (KLOR-CON M) 10 MEQ extended release tablet Take 1 tablet by mouth daily as needed (w// lasix for swelling) 5/4/20  Yes Luis Russell MD   tiZANidine (ZANAFLEX) 4 MG tablet Take 1 tablet by mouth nightly as needed (spasms) 4/23/20  Yes Luis Russell MD   omeprazole (PRILOSEC) 40 MG delayed release capsule TAKE 1 CAPSULE EVERY DAY 4/23/20  Yes Luis Russell MD   thyroid [facility] THYROID) 120 MG tablet TAKE 1 TABLET EVERY DAY 4/23/20  Yes Luis Russell MD   lisinopril-hydroCHLOROthiazide MON [facility]) 20-25 MG per tablet TAKE 1 TABLET EVERY DAY 4/23/20  Yes Luis Russell MD   estradiol (ESTRACE) 0.5 MG tablet Take 1 tablet by mouth daily 4/23/20  Yes Luis Russell MD   Magnesium 200 MG CHEW Take by mouth   Yes Historical Provider, MD   calcipotriene (DOVONEX) 0.005 % cream Apply topically 2 times daily. 7/24/19  Yes Luis Russell MD   Specialty Vitamins Products (MM BIOTIN/KERATIN PO) Take by mouth   Yes Historical Provider, MD   Cholecalciferol (VITAMIN D3) 2000 UNITS TABS Take 2 tablets by mouth daily   Yes Historical Provider, MD   Multiple Vitamins-Minerals (MULTIVITAMIN PO) Take 1 tablet by mouth daily   Yes Historical Provider, MD   meloxicam (MOBIC) 15 MG tablet Take 1 tablet by mouth daily 12/9/20 1/8/21  CHOCO Pan   furosemide (LASIX) 20 MG tablet Take 1 tablet by mouth daily as needed (swelling) 5/4/20   Luis Russell MD   Omega-3 Fatty Acids (FISH OIL) 1000 MG CAPS Take 3,000 mg by mouth daily    Historical Provider, MD     Allergies:  Codeine and Prednisone  Social History:    reports that she has been smoking. She has a 22.00 pack-year smoking history. She has never used smokeless tobacco. She reports that she does not drink alcohol or use drugs.   Family History:   Family History   Problem Relation Age of Onset    Dementia Mother     Mental Illness Mother     High Blood Pressure Mother     Heart Disease Father     High Blood Pressure Father  Stroke Maternal Grandmother     Heart Disease Other         dad's family       Vitals: Blood pressure (!) 157/77, pulse 75, temperature 97.2 °F (36.2 °C), temperature source Temporal, resp. rate 20, height 5' 2\" (1.575 m), weight 193 lb (87.5 kg), SpO2 100 %, not currently breastfeeding. PHYSICAL EXAM:including affected areas  Cardiovascular: Normal rate, regular rhythm, normal heart sounds. Pulmonary/Chest: No wheezes. Extremities: Moves all extremities equally  Cervical and Lumbar Spine: Painful range of motion, no midline tenderness       Diagnosis:Lumbar radiculopathy  M51.26  M43.16  M70.62    Plan: Proceed with planned procedure      ASA CLASS:         []   I. Normal, healthy adult           [x]   II.  Mild systemic disease            []   III. Severe systemic disease      Mallampati: Mallampati Class II - (soft palate, fauces & uvula are visible)      Sedation plan:   [x]  Local              []  Minimal                  []  General anesthesia    Patient's condition acceptable for planned procedure/sedation. Post Procedure Plan   Return to same level of care   ______________________     The patient was counseled at length about the risks of ciara Covid-19 in the ashlee-operative and post-operative states including the recovery window of their procedure. The patient was made aware that ciara Covid-19 after a surgical procedure may worsen their prognosis for recovering from the virus and lend to a higher morbidity and or mortality risk. The patient was given the options of postponing their procedure. All of the risks, benefits, and alternatives were discussed. The patient does wish to proceed with the procedure. The risks and benefits as well as alternatives to the procedure have been discussed with the patient and or family. The patient and or next of kin understands and agrees to proceed.     Garrett Woodard M.D.

## 2020-12-23 NOTE — OP NOTE
Patient:  Anthony Phillip  YOB: 1950  Medical Record #:  [id number]   Place:  [address] [place] [address]  Date:  12/23/2020   Physician:  Jil Serna MD, LEYDA    Procedure:  Lumbar Epidural Steroid Injection - Interlaminar approach L4-5     BRIE #2    CPT 56323     Pre-Procedure Diagnosis: Lumbar radiculopathy     Post-Procedure Diagnosis: Same     Sedation: Local with 1% Lidocaine 3 ml and 1 mg of IV Versed with 25 mcg of IV Fentanyl     EBL: None     Complications: None     Procedure Summary:     The patient was brought to the procedure suite and placed in the prone position. The skin overlying the lumbar spine was prepped and draped in the usual sterile fashion. Using fluoroscopic guidance, the L4-5 interlaminar space was identified. Through anesthetized skin a 20 gauge Touhy needle was advanced into the epidural space using continuous loss of resistance to saline technique. Isovue M300 was instilled and an epidurogram was noted without evidence of intrathecal or vascular spread. 10 ml of a solution containing preservative free normal saline and 80 mg of Depomedrol was instilled. The needle was removed and a band-aid applied. The patient was transferred to the post-operative area in stable condition.

## 2020-12-31 ENCOUNTER — TELEPHONE (OUTPATIENT)
Dept: FAMILY MEDICINE CLINIC | Age: 70
End: 2020-12-31

## 2020-12-31 RX ORDER — ESTRADIOL 1 MG/1
1 TABLET ORAL DAILY
Qty: 90 TABLET | Refills: 0 | Status: SHIPPED | OUTPATIENT
Start: 2020-12-31 | End: 2021-03-03 | Stop reason: SDUPTHER

## 2020-12-31 RX ORDER — OMEPRAZOLE 40 MG/1
CAPSULE, DELAYED RELEASE ORAL
Qty: 90 CAPSULE | Refills: 0 | Status: SHIPPED | OUTPATIENT
Start: 2020-12-31 | End: 2021-03-03 | Stop reason: SDUPTHER

## 2020-12-31 NOTE — TELEPHONE ENCOUNTER
PT @  [phone]    SHE IS ON ESTRADIOL 0.5 MG - IT IS MAKING HER HAVE BAD HOT FLASHES    SHE IS WANTING TO GO BACK ON THE ESTRADIOL 1 MG  - 80 DAYS        [address], [place] - [address] Brian jose david [address] [place] [address] [phone] - f [phone]

## 2020-12-31 NOTE — TELEPHONE ENCOUNTER
90 day supply sent - please f/u w/ me in next 3 months - also needs to schedule a mammogram 2/2 increased risk of breast cancer with estrogen use.

## 2021-02-24 RX ORDER — LISINOPRIL AND HYDROCHLOROTHIAZIDE 25; 20 MG/1; MG/1
TABLET ORAL
Qty: 30 TABLET | Refills: 0 | Status: SHIPPED | OUTPATIENT
Start: 2021-02-24 | End: 2021-03-03 | Stop reason: SDUPTHER

## 2021-02-24 NOTE — TELEPHONE ENCOUNTER
Called patient and left voicemail instructing pt to call and schedule a thyroid/ HTN f/u   appt with fasting  labs  Sent 30 day supply

## 2021-03-03 ENCOUNTER — OFFICE VISIT (OUTPATIENT)
Dept: FAMILY MEDICINE CLINIC | Age: 71
End: 2021-03-03
Payer: MEDICARE

## 2021-03-03 ENCOUNTER — TELEPHONE (OUTPATIENT)
Dept: FAMILY MEDICINE CLINIC | Age: 71
End: 2021-03-03

## 2021-03-03 VITALS
HEART RATE: 70 BPM | HEIGHT: 62 IN | SYSTOLIC BLOOD PRESSURE: 112 MMHG | DIASTOLIC BLOOD PRESSURE: 68 MMHG | WEIGHT: 220.6 LBS | BODY MASS INDEX: 40.59 KG/M2 | TEMPERATURE: 98.6 F | OXYGEN SATURATION: 98 %

## 2021-03-03 DIAGNOSIS — K30 DELAYED GASTRIC EMPTYING: ICD-10-CM

## 2021-03-03 DIAGNOSIS — I10 ESSENTIAL HYPERTENSION: ICD-10-CM

## 2021-03-03 DIAGNOSIS — N61.1 ABSCESS OF RIGHT BREAST: ICD-10-CM

## 2021-03-03 DIAGNOSIS — E78.1 HYPERTRIGLYCERIDEMIA: ICD-10-CM

## 2021-03-03 DIAGNOSIS — E78.1 HYPERTRIGLYCERIDEMIA: Primary | ICD-10-CM

## 2021-03-03 DIAGNOSIS — M50.20 CERVICAL HERNIATED DISC: ICD-10-CM

## 2021-03-03 DIAGNOSIS — R23.2 HOT FLASHES: ICD-10-CM

## 2021-03-03 DIAGNOSIS — Z00.00 ROUTINE GENERAL MEDICAL EXAMINATION AT A HEALTH CARE FACILITY: Primary | ICD-10-CM

## 2021-03-03 DIAGNOSIS — R73.01 IMPAIRED FASTING GLUCOSE: ICD-10-CM

## 2021-03-03 DIAGNOSIS — Z11.59 ENCOUNTER FOR HEPATITIS C SCREENING TEST FOR LOW RISK PATIENT: ICD-10-CM

## 2021-03-03 DIAGNOSIS — M51.36 DDD (DEGENERATIVE DISC DISEASE), LUMBAR: ICD-10-CM

## 2021-03-03 DIAGNOSIS — Z71.89 ACP (ADVANCE CARE PLANNING): ICD-10-CM

## 2021-03-03 DIAGNOSIS — Z28.21 PNEUMOCOCCAL VACCINATION DECLINED: ICD-10-CM

## 2021-03-03 DIAGNOSIS — Z71.6 ENCOUNTER FOR TOBACCO USE CESSATION COUNSELING: ICD-10-CM

## 2021-03-03 DIAGNOSIS — E78.2 MIXED HYPERLIPIDEMIA: ICD-10-CM

## 2021-03-03 DIAGNOSIS — Z78.0 ASYMPTOMATIC MENOPAUSAL STATE: ICD-10-CM

## 2021-03-03 DIAGNOSIS — E03.9 ACQUIRED HYPOTHYROIDISM: ICD-10-CM

## 2021-03-03 DIAGNOSIS — Z72.0 TOBACCO ABUSE: ICD-10-CM

## 2021-03-03 DIAGNOSIS — Z91.81 AT HIGH RISK FOR FALLS: ICD-10-CM

## 2021-03-03 DIAGNOSIS — Z12.31 ENCOUNTER FOR SCREENING MAMMOGRAM FOR BREAST CANCER: ICD-10-CM

## 2021-03-03 LAB
A/G RATIO: 1.2 (ref 1.1–2.2)
ALBUMIN SERPL-MCNC: 3.9 G/DL (ref 3.4–5)
ALP BLD-CCNC: 103 U/L (ref 40–129)
ALT SERPL-CCNC: 14 U/L (ref 10–40)
ANION GAP SERPL CALCULATED.3IONS-SCNC: 10 MMOL/L (ref 3–16)
AST SERPL-CCNC: 14 U/L (ref 15–37)
BILIRUB SERPL-MCNC: 0.3 MG/DL (ref 0–1)
BUN BLDV-MCNC: 13 MG/DL (ref 7–20)
CALCIUM SERPL-MCNC: 9.4 MG/DL (ref 8.3–10.6)
CHLORIDE BLD-SCNC: 104 MMOL/L (ref 99–110)
CHOLESTEROL, TOTAL: 201 MG/DL (ref 0–199)
CO2: 28 MMOL/L (ref 21–32)
CREAT SERPL-MCNC: 0.6 MG/DL (ref 0.6–1.2)
GFR AFRICAN AMERICAN: >60
GFR NON-AFRICAN AMERICAN: >60
GLOBULIN: 3.3 G/DL
GLUCOSE BLD-MCNC: 91 MG/DL (ref 70–99)
HDLC SERPL-MCNC: 58 MG/DL (ref 40–60)
HEPATITIS C ANTIBODY INTERPRETATION: NORMAL
LDL CHOLESTEROL CALCULATED: 119 MG/DL
POTASSIUM SERPL-SCNC: 4.2 MMOL/L (ref 3.5–5.1)
SODIUM BLD-SCNC: 142 MMOL/L (ref 136–145)
T3 TOTAL: 2.53 NG/ML (ref 0.8–2)
T4 FREE: 1.1 NG/DL (ref 0.9–1.8)
TOTAL PROTEIN: 7.2 G/DL (ref 6.4–8.2)
TRIGL SERPL-MCNC: 119 MG/DL (ref 0–150)
TSH REFLEX: 0.08 UIU/ML (ref 0.27–4.2)
VLDLC SERPL CALC-MCNC: 24 MG/DL

## 2021-03-03 PROCEDURE — G8483 FLU IMM NO ADMIN DOC REA: HCPCS | Performed by: NURSE PRACTITIONER

## 2021-03-03 PROCEDURE — 99497 ADVNCD CARE PLAN 30 MIN: CPT | Performed by: NURSE PRACTITIONER

## 2021-03-03 PROCEDURE — 4040F PNEUMOC VAC/ADMIN/RCVD: CPT | Performed by: NURSE PRACTITIONER

## 2021-03-03 PROCEDURE — 36415 COLL VENOUS BLD VENIPUNCTURE: CPT | Performed by: NURSE PRACTITIONER

## 2021-03-03 PROCEDURE — 3017F COLORECTAL CA SCREEN DOC REV: CPT | Performed by: NURSE PRACTITIONER

## 2021-03-03 PROCEDURE — 1123F ACP DISCUSS/DSCN MKR DOCD: CPT | Performed by: NURSE PRACTITIONER

## 2021-03-03 PROCEDURE — G0438 PPPS, INITIAL VISIT: HCPCS | Performed by: NURSE PRACTITIONER

## 2021-03-03 RX ORDER — TIZANIDINE 4 MG/1
4 TABLET ORAL NIGHTLY PRN
Qty: 30 TABLET | Refills: 5 | Status: SHIPPED | OUTPATIENT
Start: 2021-03-03 | End: 2021-05-11

## 2021-03-03 RX ORDER — GABAPENTIN 300 MG/1
300 CAPSULE ORAL 3 TIMES DAILY
COMMUNITY
End: 2021-03-03 | Stop reason: SDUPTHER

## 2021-03-03 RX ORDER — LEVOTHYROXINE AND LIOTHYRONINE 76; 18 UG/1; UG/1
TABLET ORAL
Qty: 78 TABLET | Refills: 3 | Status: SHIPPED | OUTPATIENT
Start: 2021-03-03 | End: 2022-02-25 | Stop reason: SDUPTHER

## 2021-03-03 RX ORDER — BUPROPION HYDROCHLORIDE 150 MG/1
TABLET, EXTENDED RELEASE ORAL
Qty: 60 TABLET | Refills: 5 | Status: SHIPPED | OUTPATIENT
Start: 2021-03-03 | End: 2021-08-18 | Stop reason: ALTCHOICE

## 2021-03-03 RX ORDER — LEVOTHYROXINE AND LIOTHYRONINE 76; 18 UG/1; UG/1
TABLET ORAL
Qty: 90 TABLET | Refills: 3 | Status: SHIPPED | OUTPATIENT
Start: 2021-03-03 | End: 2021-03-03 | Stop reason: SDUPTHER

## 2021-03-03 RX ORDER — ESTRADIOL 1 MG/1
1 TABLET ORAL DAILY
Qty: 90 TABLET | Refills: 3 | Status: SHIPPED | OUTPATIENT
Start: 2021-03-03 | End: 2022-02-25 | Stop reason: SDUPTHER

## 2021-03-03 RX ORDER — SIMVASTATIN 10 MG
10 TABLET ORAL NIGHTLY
Qty: 90 TABLET | Refills: 3 | Status: SHIPPED | OUTPATIENT
Start: 2021-03-03 | End: 2021-08-18

## 2021-03-03 RX ORDER — GABAPENTIN 300 MG/1
300 CAPSULE ORAL 3 TIMES DAILY
Qty: 90 CAPSULE | Refills: 0 | Status: SHIPPED | OUTPATIENT
Start: 2021-03-03 | End: 2021-12-14 | Stop reason: SDUPTHER

## 2021-03-03 RX ORDER — OMEPRAZOLE 40 MG/1
CAPSULE, DELAYED RELEASE ORAL
Qty: 90 CAPSULE | Refills: 3 | Status: SHIPPED | OUTPATIENT
Start: 2021-03-03 | End: 2022-02-25 | Stop reason: SDUPTHER

## 2021-03-03 RX ORDER — SULFAMETHOXAZOLE AND TRIMETHOPRIM 800; 160 MG/1; MG/1
1 TABLET ORAL 2 TIMES DAILY
Qty: 14 TABLET | Refills: 0 | Status: SHIPPED | OUTPATIENT
Start: 2021-03-03 | End: 2021-03-08 | Stop reason: SDUPTHER

## 2021-03-03 RX ORDER — LISINOPRIL AND HYDROCHLOROTHIAZIDE 25; 20 MG/1; MG/1
TABLET ORAL
Qty: 90 TABLET | Refills: 3 | Status: SHIPPED | OUTPATIENT
Start: 2021-03-03 | End: 2022-02-25 | Stop reason: SDUPTHER

## 2021-03-03 RX ORDER — MELOXICAM 15 MG/1
15 TABLET ORAL DAILY
Qty: 90 TABLET | Refills: 1 | Status: SHIPPED | OUTPATIENT
Start: 2021-03-03 | End: 2021-05-11

## 2021-03-03 ASSESSMENT — ENCOUNTER SYMPTOMS
SINUS PRESSURE: 0
EYE DISCHARGE: 0
BACK PAIN: 0
SINUS PAIN: 0
NAUSEA: 0
ABDOMINAL PAIN: 0
ABDOMINAL DISTENTION: 0
COUGH: 0
DIARRHEA: 0
COLOR CHANGE: 0
CONSTIPATION: 0
CHEST TIGHTNESS: 0
SHORTNESS OF BREATH: 0

## 2021-03-03 ASSESSMENT — LIFESTYLE VARIABLES
HOW OFTEN DO YOU HAVE A DRINK CONTAINING ALCOHOL: 1
HOW OFTEN DURING THE LAST YEAR HAVE YOU HAD A FEELING OF GUILT OR REMORSE AFTER DRINKING: 0
HAS A RELATIVE, FRIEND, DOCTOR, OR ANOTHER HEALTH PROFESSIONAL EXPRESSED CONCERN ABOUT YOUR DRINKING OR SUGGESTED YOU CUT DOWN: 0
HOW OFTEN DURING THE LAST YEAR HAVE YOU FAILED TO DO WHAT WAS NORMALLY EXPECTED FROM YOU BECAUSE OF DRINKING: 0
HOW OFTEN DURING THE LAST YEAR HAVE YOU FOUND THAT YOU WERE NOT ABLE TO STOP DRINKING ONCE YOU HAD STARTED: 0
HOW OFTEN DURING THE LAST YEAR HAVE YOU BEEN UNABLE TO REMEMBER WHAT HAPPENED THE NIGHT BEFORE BECAUSE YOU HAD BEEN DRINKING: 0
AUDIT-C TOTAL SCORE: 1

## 2021-03-03 ASSESSMENT — PATIENT HEALTH QUESTIONNAIRE - PHQ9
SUM OF ALL RESPONSES TO PHQ9 QUESTIONS 1 & 2: 0
1. LITTLE INTEREST OR PLEASURE IN DOING THINGS: 0
SUM OF ALL RESPONSES TO PHQ QUESTIONS 1-9: 0
2. FEELING DOWN, DEPRESSED OR HOPELESS: 0
SUM OF ALL RESPONSES TO PHQ QUESTIONS 1-9: 0

## 2021-03-03 NOTE — PATIENT INSTRUCTIONS
Call insurance company to discuss coverage for shingles vaccine (Shingrix) 2 dose series- go through [address] scan- bone density testing, call [facility] to schedule    Call [facility] to schedule mammogram ASAP    Due for pneumonia 23 vaccine. Can get here at office with lab visit or at pharmacy     Advance Directives: Care Instructions  Overview  An advance directive is a legal way to state your wishes at the end of your life. It tells your family and your doctor what to do if you can't say what you want. There are two main types of advance directives. You can change them any time your wishes change. Living will. This form tells your family and your doctor your wishes about life support and other treatment. The form is also called a declaration. Medical power of . This form lets you name a person to make treatment decisions for you when you can't speak for yourself. This person is called a health care agent (health care proxy, health care surrogate). The form is also called a durable power of  for health care. If you do not have an advance directive, decisions about your medical care may be made by a family member, or by a doctor or a  who doesn't know you. It may help to think of an advance directive as a gift to the people who care for you. If you have one, they won't have to make tough decisions by themselves. Follow-up care is a key part of your treatment and safety. Be sure to make and go to all appointments, and call your doctor if you are having problems. It's also a good idea to know your test results and keep a list of the medicines you take. What should you include in an advance directive? Many states have a unique advance directive form. (It may ask you to address specific issues.) Or you might use a universal form that's approved by many states. If your form doesn't tell you what to address, it may be hard to know what to include in your advance directive. Use the questions below to help you get started. · Who do you want to make decisions about your medical care if you are not able to? · What life-support measures do you want if you have a serious illness that gets worse over time or can't be cured? · What are you most afraid of that might happen? (Maybe you're afraid of having pain, losing your independence, or being kept alive by machines.)  · Where would you prefer to die? (Your home? A hospital? A nursing home?)  · Do you want to donate your organs when you die? · Do you want certain Religion practices performed before you die? When should you call for help? Be sure to contact your doctor if you have any questions. Where can you learn more? Go to https://chpepicewreji.Playtabase. org and sign in to your Crashmob account. Enter R264 in the Stirplate.io box to learn more about \"Advance Directives: Care Instructions. \"     If you do not have an account, please click on the \"Sign Up Now\" link. Current as of: December 9, 2019               Content Version: 12.6  © 9099-9638 Healthwise, Incorporated. Care instructions adapted under license by [facility] ([facility]). If you have questions about a medical condition or this instruction, always ask your healthcare professional. [place] [address] any warranty or liability for your use of this information. Learning About Medical Power of   What is a medical power of ? A medical power of , also called a durable power of  for health care, is one type of the legal forms called advance directives. It lets you name the person you want to make treatment decisions for you if you can't speak or decide for yourself. The person you choose is called your health care agent. This person is also called a health care proxy or health care surrogate. A medical power of  may be called something else in your state. How do you choose a health care agent? Choose your health care agent carefully. This person may or may not be a family member. Talk to the person before you make your final decision. Make sure he or she is comfortable with this responsibility. It's a good idea to choose someone who:  · Is at least 25years old. · Knows you well and understands what makes life meaningful for you. · Understands your Rastafarian and moral values. · Will do what you want, not what he or she wants. · Will be able to make difficult choices at a stressful time. · Will be able to refuse or stop treatment, if that is what you would want, even if you could die. · Will be firm and confident with health professionals if needed. · Will ask questions to get needed information. · Lives near you or agrees to travel to you if needed. Your family may help you make medical decisions while you can still be part of that process. But it's important to choose one person to be your health care agent in case you aren't able to make decisions for yourself. If you don't fill out the legal form and name a health care agent, the decisions your family can make may be limited. A health care agent may be called something else in your state. Who will make decisions for you if you don't have a health care agent? If you don't have a health care agent or a living will, you may not get the care you want. Decisions may be made by family members who disagree about your medical care. Or decisions may be made by a medical professional who doesn't know you well. In some cases, a  makes the decisions. When you name a health care agent, it is very clear who has the power to make health decisions for you. How do you name a health care agent? You name your health care agent on a legal form. This form is usually called a medical power of . Ask your hospital, state bar association, or office on aging where to find these forms. You must sign the form to make it legal. Some states require you to get the form notarized. This means that a person called a  watches you sign the form and then he or she signs the form. Some states also require that two or more witnesses sign the form. Be sure to tell your family members and doctors who your health care agent is. Where can you learn more? Go to https://chpepiceweb.MobiDough. org and sign in to your OncoEthix account. Enter [id number] in the Halldis box to learn more about \"Learning About [address]. \"     If you do not have an account, please click on the \"Sign Up Now\" link. Current as of: December 9, 2019               Content Version: 12.6  © 2381-5774 Whooch. Care instructions adapted under license by [facility] ([facility]). If you have questions about a medical condition or this instruction, always ask your healthcare professional. [address] any warranty or liability for your use of this information. Learning About Living Geeta Caldera  What is a living will? A living will, also called a declaration, is a legal form. It tells your family and your doctor your wishes when you can't speak for yourself. It's used by the health professionals who will treat you as you near the end of your life or if you get seriously hurt or ill. If you put your wishes in writing, your loved ones and others will know what kind of care you want. They won't need to guess. This can ease your mind and be helpful to others. And you can change or cancel your living will at any time. A living will is not the same as an estate or property will. An estate will explains what you want to happen with your money and property after you die. How do you use it? A living will is used to describe the kinds of treatment or life support you want as you near the end of your life or if you get seriously hurt or ill. Keep these facts in mind about living bhatti. · Your living will is used only if you can't speak or make decisions for yourself. Most often, one or more doctors must certify that you can't speak or decide for yourself before your living will takes effect. · If you get better and can speak for yourself again, you can accept or refuse any treatment. It doesn't matter what you said in your living will. · Some states may limit your right to refuse treatment in certain cases. For example, you may need to clearly state in your living will that you don't want artificial hydration and nutrition, such as being fed through a tube. Is a living will a legal document? A living will is a legal document. Each state has its own laws about living bhatti. And a living will may be called something else in your state. Here are some things to know about living bhatti. · You don't need an  to complete a living will. But legal advice can be helpful if your state's laws are unclear. It can also help if your health history is complicated or your family can't agree on what should be in your living will. · You can change your living will at any time. Some people find that their wishes about end-of-life care change as their health changes. If you make big changes to your living will, complete a new form. · If you move to another state, make sure that your living will is legal in the state where you now live. In most cases, doctors will respect your wishes even if you have a form from a different state. · Put a copy of your living will where it can be easily found. For example, some people may put a copy on their refrigerator door. If you are using a digital copy, be sure your doctor, family members, and health care agent know how to find and access it. Where can you learn more? Go to https://chpepiceweb.Lumexis. org and sign in to your Ink361 account. Enter H593 in the KylesAlekto box to learn more about \"Learning About Living Foster Hunger. \"     If you do not have an account, please click on the \"Sign Up Now\" link. Current as of: December 9, 2019               Content Version: 12.6  © 1297-9399 [employer], [employer]. Care instructions adapted under license by [facility] ([facility]). If you have questions about a medical condition or this instruction, always ask your healthcare professional. [place] [address] any warranty or liability for your use of this information. Eating Healthy Foods: Care Instructions  Your Care Instructions     Eating healthy foods can help lower your risk for disease. Healthy food gives you energy and keeps your heart strong, your brain active, your muscles working, and your bones strong. A healthy diet includes a variety of foods from the basic food groups: grains, vegetables, fruits, milk and milk products, and meat and beans. Some people may eat more of their favorite foods from only one food group and, as a result, miss getting the nutrients they need. So, it is important to pay attention not only to what you eat but also to what you are missing from your diet. You can eat a healthy, balanced diet by making a few small changes. Follow-up care is a key part of your treatment and safety. Be sure to make and go to all appointments, and call your doctor if you are having problems. It's also a good idea to know your test results and keep a list of the medicines you take. How can you care for yourself at home?   Look at what you eat · Keep a food diary for a week or two and record everything you eat or drink. Track the number of servings you eat from each food group. · For a balanced diet every day, eat a variety of:  ? 6 or more ounce-equivalents of grains, such as cereals, breads, crackers, rice, or pasta, every day. An ounce-equivalent is 1 slice of bread, 1 cup of ready-to-eat cereal, or ½ cup of cooked rice, cooked pasta, or cooked cereal.  ? 2½ cups of vegetables, especially:  § Dark-green vegetables such as broccoli and spinach. § Orange vegetables such as carrots and sweet potatoes. § Dry beans (such as harrell and kidney beans) and peas (such as lentils). ? 2 cups of fresh, frozen, or canned fruit. A small apple or 1 banana or orange equals 1 cup. ? 3 cups of nonfat or low-fat milk, yogurt, or other milk products. ? 5½ ounces of meat and beans, such as chicken, fish, lean meat, beans, nuts, and seeds. One egg, 1 tablespoon of peanut butter, ½ ounce nuts or seeds, or ¼ cup of cooked beans equals 1 ounce of meat. · Learn how to read food labels for serving sizes and ingredients. Fast-food and convenience-food meals often contain few or no fruits or vegetables. Make sure you eat some fruits and vegetables to make the meal more nutritious. · Look at your food diary. For each food group, add up what you have eaten and then divide the total by the number of days. This will give you an idea of how much you are eating from each food group. See if you can find some ways to change your diet to make it more healthy. Start small  · Do not try to make dramatic changes to your diet all at once. You might feel that you are missing out on your favorite foods and then be more likely to fail. · Start slowly, and gradually change your habits. Try some of the following:  ? Use whole wheat bread instead of white bread. ? Use nonfat or low-fat milk instead of whole milk. ? Eat brown rice instead of white rice, and eat whole wheat pasta instead of white-flour pasta. ? Try low-fat cheeses and low-fat yogurt. ? Add more fruits and vegetables to meals and have them for snacks. ? Add lettuce, tomato, cucumber, and onion to sandwiches. ? Add fruit to yogurt and cereal.  Enjoy food  · You can still eat your favorite foods. You just may need to eat less of them. If your favorite foods are high in fat, salt, and sugar, limit how often you eat them, but do not cut them out entirely. · Eat a wide variety of foods. Make healthy choices when eating out  · The type of restaurant you choose can help you make healthy choices. Even fast-food chains are now offering more low-fat or healthier choices on the menu. · Choose smaller portions, or take half of your meal home. · When eating out, try:  ? A veggie pizza with a whole wheat crust or grilled chicken (instead of sausage or pepperoni). ? Pasta with roasted vegetables, grilled chicken, or marinara sauce instead of cream sauce. ? A vegetable wrap or grilled chicken wrap. ? Broiled or poached food instead of fried or breaded items. Make healthy choices easy  · Buy packaged, prewashed, ready-to-eat fresh vegetables and fruits, such as baby carrots, salad mixes, and chopped or shredded broccoli and cauliflower. · Buy packaged, presliced fruits, such as melon or pineapple. · Choose 100% fruit or vegetable juice instead of soda. Limit juice intake to 4 to 6 oz (½ to ¾ cup) a day. · Blend low-fat yogurt, fruit juice, and canned or frozen fruit to make a smoothie for breakfast or a snack. Where can you learn more? Go to https://Cytomedixkylie.Clear Creek Networks. org and sign in to your LTG Exam Prep Platform account. Enter C947 in the F3 Foods box to learn more about \"Eating Healthy Foods: Care Instructions. \"     If you do not have an account, please click on the \"Sign Up Now\" link. Current as of: August 22, 2019               Content Version: 12.6  © 5914-0694 Takwin Labs, Incorporated. Care instructions adapted under license by [facility] ([facility]). If you have questions about a medical condition or this instruction, always ask your healthcare professional. [address] any warranty or liability for your use of this information. Personalized Preventive Plan for Angie Negron - 3/3/2021  Medicare offers a range of preventive health benefits. Some of the tests and screenings are paid in full while other may be subject to a deductible, co-insurance, and/or copay. Some of these benefits include a comprehensive review of your medical history including lifestyle, illnesses that may run in your family, and various assessments and screenings as appropriate. After reviewing your medical record and screening and assessments performed today your provider may have ordered immunizations, labs, imaging, and/or referrals for you. A list of these orders (if applicable) as well as your Preventive Care list are included within your After Visit Summary for your review. Other Preventive Recommendations:    · A preventive eye exam performed by an eye specialist is recommended every 1-2 years to screen for glaucoma; cataracts, macular degeneration, and other eye disorders. · A preventive dental visit is recommended every 6 months. · Try to get at least 150 minutes of exercise per week or 10,000 steps per day on a pedometer . · Order or download the FREE \"Exercise & Physical Activity: Your Everyday Guide\" from [employer]. Call 2-457.861.2859 or search [employer] online. · You need 9948-2319 mg of calcium and 0666-2744 IU of vitamin D per day.  It is possible to meet your calcium requirement with diet alone, but a vitamin D supplement is usually necessary to meet this goal. Recombinant shingles vaccine is the preferred vaccine for the prevention of shingles. However, a different vaccine, live shingles vaccine, may be used in some circumstances. The recombinant shingles vaccine is recommended for adults 50 years and older without serious immune problems. It is given as a two-dose series. This vaccine is also recommended for people who have already gotten another type of shingles vaccine, the live shingles vaccine. There is no live virus in this vaccine. Shingles vaccine may be given at the same time as other vaccines. Talk with your health care provider  Tell your vaccine provider if the person getting the vaccine:  Has had an allergic reaction after a previous dose of recombinant shingles vaccine, or has any severe, life-threatening allergies. Is pregnant or breastfeeding. Is currently experiencing an episode of shingles. In some cases, your health care provider may decide to postpone shingles vaccination to a future visit. People with minor illnesses, such as a cold, may be vaccinated. People who are moderately or severely ill should usually wait until they recover before getting recombinant shingles vaccine. Your health care provider can give you more information. Risks of a vaccine reaction  A sore arm with mild or moderate pain is very common after recombinant shingles vaccine, affecting about 80% of vaccinated people. Redness and swelling can also happen at the site of the injection. Tiredness, muscle pain, headache, shivering, fever, stomach pain, and nausea happen after vaccination in more than half of people who receive recombinant shingles vaccine. In clinical trials, about 1 out of 6 people who got recombinant zoster vaccine experienced side effects that prevented them from doing regular activities. Symptoms usually went away on their own in 2 to 3 days. Care instructions adapted under license by [facility] ([facility]). If you have questions about a medical condition or this instruction, always ask your healthcare professional. [place] [address] any warranty or liability for your use of this information. Patient Education        pneumococcal polysaccharides vaccine (PPSV), 23-valent  Pronunciation:  KEYUR Lobo ee SAK a leon, 23-VABORIS zepeda  Brand:  Pneumovax 23  What is the most important information I should know about this vaccine? PPSV should be given at least 2 weeks before the start of any treatment that can weaken your immune system. PPSV is also given at least 2 weeks before you undergo a splenectomy (surgical removal of the spleen). The timing of this vaccination is very important for it to be effective. Follow your doctor's instructions. You can still receive a vaccine if you have a cold or fever. In the case of a more severe illness with a fever or any type of infection, wait until you get better before receiving this vaccine. You should not receive a booster vaccine if you had a life-threatening allergic reaction after the first shot. Keep track of any and all side effects you have after receiving this vaccine. If you ever need to receive a booster dose, you will need to tell your doctor if the previous shot caused any side effects. Becoming infected with pneumococcal disease (such as pneumonia or meningitis) is much more dangerous to your health than receiving this vaccine. However, like any medicine, this vaccine can cause side effects but the risk of serious side effects is extremely low. What is pneumococcal polysaccharides vaccine (PPSV)? Pneumococcal disease is a serious infection caused by a bacteria. Pneumococcal bacteria can infect the sinuses and inner ear.  It can also infect the lungs, blood, and brain and these conditions can be fatal. Pneumococcal polysaccharides vaccine (PPSV) is used to prevent infection caused by pneumococcal bacteria. PPSV contains 23 of the most common types of pneumococcal bacteria. PPSV works by exposing you to a small dose of the bacteria or a protein from the bacteria, which causes your body to develop immunity to the disease. PPSV will not treat an active infection that has already developed in the body. PPSV is for use only in adults and children who are at least 3years old. For children younger than 3years old, another vaccine called Prevnar (pneumococcal conjugate vaccine [PCV] 7-valent) is used, usually given between the ages of 2 months and 15 months. Like any vaccine, PPSV may not provide protection from disease in every person. What should I discuss with my healthcare provider before receiving this vaccine? You should not receive this vaccine if you have ever had a life-threatening allergic reaction to any pneumococcal polysaccharides vaccine. Before receiving this vaccine, tell your doctor if you are allergic to any drugs, or if you have a bleeding or blood clotting disorder such as hemophilia, or easy bruising. The timing and number of PPSV doses you receive will depend on whether you have any of these other conditions:  cancer, leukemia, lymphoma, or multiple myeloma;  HIV or AIDS;  sickle cell disease;  a kidney condition called nephrotic syndrome;  a history of organ or bone marrow transplant;  if you are receiving chemotherapy;  if you have been using steroid medication for a long period of time;  if you are scheduled to have your spleen removed (splenectomy); or  if you have received a pneumococcal vaccine within the past 3 to 5 years. You can still receive a vaccine if you have a cold or fever. In the case of a more severe illness with a fever or any type of infection, wait until you get better before receiving this vaccine. Vaccines may be harmful to an unborn baby and generally should not be given to a pregnant woman. However, not vaccinating the mother could be more harmful to the baby if the mother becomes infected with a disease that this vaccine could prevent. Your doctor will decide whether you should receive this vaccine, especially if you have a high risk of infection with pneumococcal disease. It is not known whether PPSV passes into breast milk or if it could harm a nursing baby. Do not use this medication without telling your doctor if you are breast-feeding a baby. How is this vaccine given? PPSV is given as an injection (shot) under the skin or into a muscle of your arm or thigh. You will receive this injection in a doctor's office or other clinic setting. PPSV is usually given as a routine vaccination in adults who are 72 years and older. PPSV may also be given to people between the ages 3and 59years old who have:  heart disease, lung disease, or diabetes;  a cerebrospinal fluid leak, or a cochlear implant (an electronic hearing device);  alcoholism or liver disease (including cirrhosis);  sickle cell disease or a disorder of the spleen;  a weak immune system caused by HIV, AIDS, cancer, kidney failure, organ transplantation, or a damaged spleen; or  a weak immune system caused by taking steroids or receiving chemotherapy or radiation treatment. PPSV may also be given to people between the ages 23and 59years old who smoke or have asthma. PPSV should be given at least 2 weeks before the start of any treatment that can weaken your immune system. PPSV is also given at least 2 weeks before you undergo a splenectomy (surgical removal of the spleen). The timing of this vaccination is very important for it to be effective. Follow your doctor's instructions. Your doctor may recommend treating fever and pain with an aspirin-free pain reliever such as acetaminophen (Tylenol) or ibuprofen (Motrin, Advil, and others) when the shot is given and for the next 24 hours. Follow the label directions or your doctor's instructions about how much of this medicine to take. If your doctor has prescribed an antibiotic (such as penicillin) to help prevent infection with pneumococcal bacteria, do not stop using the antibiotic after you receive the PPSV. Take the antibiotic for the entire length of time prescribed by your doctor. Most people receive only one PPSV shot during their lifetime. However, people in certain age groups or with certain disease conditions that put them at risk of infection may need to receive more than one vaccine. Before receiving this vaccine, tell your doctor if you have received a pneumococcal vaccine within the past 3 to 5 years. What happens if I miss a dose? Since PPSV is usually given only one time, you will most likely not be on a dosing schedule. If you are receiving a repeat PPSV shot, be sure to tell your doctor if it has been less than 5 years since you last received a pneumococcal vaccine. What happens if I overdose? An overdose of this vaccine is not likely to occur. What should I avoid before or after receiving this vaccine ? Follow your doctor's instructions about any restrictions on food, beverages, or activity. What are the possible side effects of this vaccine? You should not receive a booster vaccine if you had a life-threatening allergic reaction after the first shot. Keep track of any and all side effects you have after receiving this vaccine. If you ever need to receive a booster dose, you will need to tell your doctor if the previous shot caused any side effects. Becoming infected with pneumococcal disease (such as pneumonia or meningitis) is much more dangerous to your health than receiving this vaccine. However, like any medicine, this vaccine can cause side effects but the risk of serious side effects is extremely low. Get emergency medical help if you have any of these signs of an allergic reaction: hives; difficulty breathing; swelling of your face, lips, tongue, or throat. Call your doctor at once if you have a serious side effect such as:  high fever (103 degrees or higher);  easy bruising or bleeding;  swollen glands with skin rash or itching, joint pain, and general ill feeling;  pale or yellowed skin, dark colored urine, confusion or weakness;  numbness or tingly feeling in your feet and spreading upward, severe lower back pain;  changes in behavior, problems with vision, speech, swallowing, or bladder and bowel functions; or  slow heart rate, trouble breathing, feeling like you might pass out. Less serious side effects are more likely to occur, such as:  low fever (102 degrees or less), chills, tired feeling;  swelling, pain, tenderness, or redness anywhere on your body;  headache, nausea, vomiting;  joint or muscle pain;  swelling or stiffness in the arm or leg the vaccine was injected into;  mild skin rash; or  mild soreness, warmth, redness, swelling, or a hard lump where the shot was given. This is not a complete list of side effects and others may occur. Call your doctor for medical advice about side effects. You may report vaccine side effects to the [place] [address] and Human Services at [phone]. What other drugs will affect pneumococcal polysaccharides vaccine (PPSV)? Before receiving this vaccine, tell the doctor about all other vaccines you have recently received.   Also tell the doctor if you have recently received drugs or treatments that can weaken the immune system, including: an oral, nasal, inhaled, or injectable steroid medicine;  medications to treat psoriasis, rheumatoid arthritis, or other autoimmune disorders, such as azathioprine (Imuran), etanercept (Enbrel), leflunomide ([place]), and others; or  medicines to treat or prevent organ transplant rejection, such as basiliximab (Simulect), cyclosporine (Sandimmune, Neoral, Gengraf), muromonab-CD3 (Orthoclone), mycophenolate mofetil (CellCept), sirolimus (Rapamune), or tacrolimus (Prograf). If you are using any of these medications, you may not be able to receive the vaccine, or may need to wait until the other treatments are finished. This list is not complete and other drugs may interact with PPSV. Tell your doctor about all medications you use. This includes prescription, over-the-counter, vitamin, and herbal products. Do not start a new medication without telling your doctor. Where can I get more information? Your doctor or pharmacist may have additional information about pneumococcal polysaccharides vaccine. You may also find additional information from your local health department or the Centers for Disease Control and Prevention. Remember, keep this and all other medicines out of the reach of children, never share your medicines with others, and use this medication only for the indication prescribed. Every effort has been made to ensure that the information provided by [address] is accurate, up-to-date, and complete, but no guarantee is made to that effect. Drug information contained herein may be time sensitive. [facility] information has been compiled for use by healthcare practitioners and consumers in the [place] and therefore [facility] does not warrant that uses outside of the [place] are appropriate, unless specifically indicated otherwise. [facility]'s drug information does not endorse drugs, diagnose patients or recommend therapy. [facility]'s drug information is an informational resource designed to assist licensed healthcare practitioners in caring for their patients and/or to serve consumers viewing this service as a supplement to, and not a substitute for, the expertise, skill, knowledge and judgment of healthcare practitioners. The absence of a warning for a given drug or drug combination in no way should be construed to indicate that the drug or drug combination is safe, effective or appropriate for any given patient. [facility] does not assume any responsibility for any aspect of healthcare administered with the aid of information [facility] provides. The information contained herein is not intended to cover all possible uses, directions, precautions, warnings, drug interactions, allergic reactions, or adverse effects. If you have questions about the drugs you are taking, check with your doctor, nurse or pharmacist.  Copyright 3619-2145 [address] [place] [address] Dr FAYE. Version: 5.02. Revision date: 10/17/2012. Care instructions adapted under license by [facility] ([facility]). If you have questions about a medical condition or this instruction, always ask your healthcare professional. [place] [address] any warranty or liability for your use of this information. Patient Education        Pneumococcal Polysaccharide Vaccine: What You Need to Know  Why get vaccinated? People with minor illnesses, such as a cold, may be vaccinated. People who are moderately or severely ill should usually wait until they recover before getting PPSV23. Your health care provider can give you more information. Risks of a vaccine reaction  Redness or pain where the shot is given, feeling tired, fever, or muscle aches can happen after PPSV23. People sometimes faint after medical procedures, including vaccination. Tell your provider if you feel dizzy or have vision changes or ringing in the ears. As with any medicine, there is a very remote chance of a vaccine causing a severe allergic reaction, other serious injury, or death. What if there is a serious problem? An allergic reaction could occur after the vaccinated person leaves the clinic. If you see signs of a severe allergic reaction (hives, swelling of the face and throat, difficulty breathing, a fast heartbeat, dizziness, or weakness), call 9-1-1 and get the person to the nearest hospital.  For other signs that concern you, call your health care provider. Adverse reactions should be reported to the Vaccine Adverse Event Reporting System (VAERS). Your health care provider will usually file this report, or you can do it yourself. Visit the VAERS website at www.vaers. hhs.gov at www.vaers. hhs.gov or call 9-797.890.5307. VAERS is only for reporting reactions, and VAERS staff do not give medical advice. How can I learn more? Ask your health care provider. Call your local or state health department. Contact the Centers for Disease Control and Prevention (CDC): Call 9-898.633.2090 (1-800-CDC-INFO) or  Visit CDC's website at www.cdc.gov/vaccines  Vaccine Information Statement  PPSV23 Vaccine  10/30/2019  [facility] and [facility] for Disease Control and Prevention  Many Vaccine Information Statements are available in Hebrew and other languages. See www.immunize.org/vis. Hojas de información Sobre Vacunas están disponibles en español y en muchos otros idiomas. Visite [employer]. Care instructions adapted under license by [facility] ([facility]). If you have questions about a medical condition or this instruction, always ask your healthcare professional. [address] any warranty or liability for your use of this information.

## 2021-03-03 NOTE — PROGRESS NOTES
Medicare Annual Wellness Visit  Name: Catina Camara Date: 3/3/2021   MRN: [id number] Sex: Female   Age: 72 y.o. Ethnicity: Non-/Non    : 1950 Race: Antolin Soria is here for Medicare AWV (MEDICARE ANNUAL WELL VISIT- BLOOD WORK )    Screenings for behavioral, psychosocial and functional/safety risks, and cognitive dysfunction are all negative except as indicated below. These results, as well as other patient data from the [address] form, are documented in Flowsheets linked to this Encounter. Allergies   Allergen Reactions    Codeine      delusions    Prednisone Nausea Only, Palpitations and Other (See Comments)     Dizziness, swollen eye,heart paplitations         Prior to Visit Medications    Medication Sig Taking? Authorizing Provider   gabapentin (NEURONTIN) 300 MG capsule Take 1 capsule by mouth 3 times daily for 30 days. Take 300 mg by mouth 3 times daily.  Yes PRAMOD Miles CNP   lisinopril-hydroCHLOROthiazide (PRINZIDE;ZESTORETIC) 20-25 MG per tablet TAKE 1 TABLET EVERY DAY Yes PRAMOD Miles CNP   omeprazole (PRILOSEC) 40 MG delayed release capsule TAKE 1 CAPSULE EVERY DAY Yes PRAMOD Miles CNP   meloxicam (MOBIC) 15 MG tablet Take 1 tablet by mouth daily Yes PRAMOD Miles CNP   tiZANidine (ZANAFLEX) 4 MG tablet Take 1 tablet by mouth nightly as needed (spasms) Yes PRAMOD Miles CNP   thyroid (ARMOUR THYROID) 120 MG tablet TAKE 1 TABLET EVERY DAY Yes PRAMOD Miles CNP   estradiol (ESTRACE) 1 MG tablet Take 1 tablet by mouth daily Yes PRAMOD Miles CNP   buPROPion [facility] SR) 150 MG extended release tablet 1 in AM x 3 days and then 1 tab twice daily with 2nd dose prior to 4 pm Yes PRAMOD Miles CNP sulfamethoxazole-trimethoprim (BACTRIM DS;SEPTRA DS) 800-160 MG per tablet Take 1 tablet by mouth 2 times daily for 7 days Yes Merrianne Dakins, APRN - CNP   mupirocin (BACTROBAN) 2 % ointment Apply 2 times daily to right breast site Yes Merrianne Dakins, APRN - CNP   Magnesium 200 MG CHEW Take by mouth Yes Historical Provider, MD   calcipotriene (DOVONEX) 0.005 % cream Apply topically 2 times daily.  Yes William Ray MD   Specialty Vitamins Products (MM BIOTIN/KERATIN PO) Take by mouth Yes Historical Provider, MD   Cholecalciferol (VITAMIN D3) 2000 UNITS TABS Take 2 tablets by mouth daily Yes Historical Provider, MD   Omega-3 Fatty Acids (FISH OIL) 1000 MG CAPS Take 3,000 mg by mouth daily Yes Historical Provider, MD   furosemide (LASIX) 20 MG tablet Take 1 tablet by mouth daily as needed (swelling)  Patient not taking: Reported on 3/3/2021  William Ray MD   potassium chloride (KLOR-CON M) 10 MEQ extended release tablet Take 1 tablet by mouth daily as needed (w// lasix for swelling)  Patient not taking: Reported on 3/3/2021  William Ray MD   Multiple Vitamins-Minerals (MULTIVITAMIN PO) Take 1 tablet by mouth daily  Historical Provider, MD         Past Medical History:   Diagnosis Date    Delayed gastric emptying 3/27/2014    Hayfever     Hypertension     Hypothyroid 4/20/2011    Right knee pain 7/12/2013    Sleep apnea     CPAP       Past Surgical History:   Procedure Laterality Date    ADENOIDECTOMY      AS A CHILD    APPENDECTOMY      CERVICAL SPINE SURGERY N/A 7/8/2020    C6C7 INTERLAMINAR EPIDURAL STEROID INJECTION WITH FLUOROSCOPY performed by Kylee Berman MD at [facility]  2008    normal    COLONOSCOPY  9/23/2020    COLONOSCOPY POLYPECTOMY SNARE/COLD BIOPSY performed by Kari Laguna MD at [address] [facility] N/A 4/19/2019    L4/L5 INTERLAMINAR EPIDURAL STEROID INJECTION WITH FLUOROSCOPY performed by Kylee Berman MD at [address] [facility]  HYSTERECTOMY      bilat ovaries    PAIN MANAGEMENT PROCEDURE N/A 1/22/2020    L4L5 INTERLAMINAR EPIDURAL STEROID INJECTION WITH FLUOROSCOPY performed by Wilfred Anguiano MD at [address] [facility] N/A 12/23/2020    MIDLINE L4L5 INTERLAMINAR EPIDURAL STEROID INJECTION WITH FLUOROSCOPY performed by Wilfred Anguiano MD at [address]    UPPER GASTROINTESTINAL ENDOSCOPY N/A 9/23/2020    EGD DILATION BALLOON performed by Taras Rodriguez MD at [address] [facility] EXTRACTION           Family History   Problem Relation Age of Onset    Dementia Mother     Mental Illness Mother     High Blood Pressure Mother     Heart Disease Father     High Blood Pressure Father     Stroke Maternal Grandmother     Heart Disease Other         dad's family       CareTeam (Including outside providers/suppliers regularly involved in providing care):   Patient Care Team:  Sherice Shine MD as PCP - General (Family Medicine)  Sherice Shine MD as PCP - [facility] Empaneled Provider  Wilfred Anguiano MD as Consulting Physician (Pain Management)    Wt Readings from Last 3 Encounters:   03/03/21 220 lb 9.6 oz (100.1 kg)   12/23/20 193 lb (87.5 kg)   12/09/20 201 lb (91.2 kg)     Vitals:    03/03/21 1053   BP: 112/68   Site: Right Upper Arm   Position: Sitting   Cuff Size: Medium Adult   Pulse: 70   Temp: 98.6 °F (37 °C)   TempSrc: Infrared   SpO2: 98%   Weight: 220 lb 9.6 oz (100.1 kg)   Height: 5' 2\" (1.575 m)     Body mass index is 40.35 kg/m². Based upon direct observation of the patient, evaluation of cognition reveals recent and remote memory intact. Review of Systems   Constitutional: Negative for activity change, appetite change, fatigue, fever and unexpected weight change. HENT: Negative for congestion, ear pain, sinus pressure and sinus pain. Eyes: Negative for discharge and visual disturbance. Respiratory: Negative for cough, chest tightness and shortness of breath. Cardiovascular: Negative for chest pain, palpitations and leg swelling. Gastrointestinal: Negative for abdominal distention, abdominal pain, constipation, diarrhea and nausea. Endocrine: Negative for cold intolerance, heat intolerance, polydipsia, polyphagia and polyuria. Genitourinary: Negative for decreased urine volume, difficulty urinating, dysuria, flank pain, frequency and urgency. 1 week ago noticed on right breast the size of a pimple and now it has enlarged to the size of small lemon (the redness)   Musculoskeletal: Negative for arthralgias, back pain, gait problem, joint swelling, myalgias and neck pain. Skin: Positive for wound (right breast). Negative for color change and rash. Allergic/Immunologic: Negative for food allergies and immunocompromised state. Neurological: Negative for dizziness, tremors, speech difficulty, weakness, light-headedness, numbness and headaches. Hematological: Negative for adenopathy. Does not bruise/bleed easily. Psychiatric/Behavioral: Negative for confusion, decreased concentration, self-injury, sleep disturbance and suicidal ideas. The patient is not nervous/anxious. Patient's complete Health Risk Assessment and screening values have been reviewed and are found in Flowsheets. The following problems were reviewed today and where indicated follow up appointments were made and/or referrals ordered. Physical Exam  Vitals signs reviewed. Constitutional:       General: She is awake. Appearance: Normal appearance. She is well-developed and well-groomed. She is morbidly obese. She is not ill-appearing. HENT:      Head: Normocephalic and atraumatic. Right Ear: Hearing, tympanic membrane, ear canal and external ear normal.      Left Ear: Hearing, tympanic membrane, ear canal and external ear normal.      Nose: Nose normal.      Mouth/Throat:      Lips: Pink. Mouth: Mucous membranes are moist.      Pharynx: Oropharynx is clear. Eyes:      General: Lids are normal.      Extraocular Movements: Extraocular movements intact. Conjunctiva/sclera: Conjunctivae normal.      Pupils: Pupils are equal, round, and reactive to light. Neck:      Musculoskeletal: Full passive range of motion without pain, normal range of motion and neck supple. Thyroid: No thyromegaly. Vascular: No carotid bruit. Cardiovascular:      Rate and Rhythm: Normal rate. Pulses:           Carotid pulses are 2+ on the right side and 2+ on the left side. Radial pulses are 2+ on the right side and 2+ on the left side. Posterior tibial pulses are 2+ on the right side and 2+ on the left side. Heart sounds: Normal heart sounds, S1 normal and S2 normal. No murmur. Pulmonary:      Effort: Pulmonary effort is normal.      Breath sounds: Normal breath sounds. Abdominal:      General: Bowel sounds are normal. There is no abdominal bruit. Palpations: Abdomen is soft. Tenderness: There is no abdominal tenderness. Genitourinary:     Comments: Deferred  Musculoskeletal: Normal range of motion. Right lower leg: No edema. Left lower leg: No edema. Lymphadenopathy:      Head:      Right side of head: No submental, submandibular, tonsillar, preauricular, posterior auricular or occipital adenopathy. Left side of head: No submental, submandibular, tonsillar, preauricular, posterior auricular or occipital adenopathy. Cervical: No cervical adenopathy. Right cervical: No superficial, deep or posterior cervical adenopathy. Left cervical: No superficial, deep or posterior cervical adenopathy. Upper Body:      Right upper body: No supraclavicular adenopathy. Left upper body: No supraclavicular adenopathy. Skin:     General: Skin is warm and dry. Capillary Refill: Capillary refill takes less than 2 seconds. General Health and ACP:  General  In general, how would you say your health is?: Very Good  In the past 7 days, have you experienced any of the following? New or Increased Pain, New or Increased Fatigue, Loneliness, Social Isolation, Stress or Anger?: (!) New or Increased Pain  Do you get the social and emotional support that you need?: Yes  Do you have a Living Will?: Yes  Advance Directives     [facility] ACP-Advance Directive ACP-Power of     Not on File Not on File Not on File Not on File      General Health Risk Interventions:  · Pain issues: home exercises provided, follow up with Dr Daniella Jennings Habits/Nutrition:  Health Habits/Nutrition  Do you exercise for at least 20 minutes 2-3 times per week?: Yes  Have you lost any weight without trying in the past 3 months?: No  Do you eat only one meal per day?: No  Have you seen the dentist within the past year?: Yes  Body mass index: (!) 40.34  Health Habits/Nutrition Interventions:  · . Physical activity 150 minutes weekly recommended   · Weight loss, initial goal 10% of body weight recommended  ·     Hearing/Vision:  No exam data present  Hearing/Vision  Do you or your family notice any trouble with your hearing that hasn't been managed with hearing aids?: (!) Yes  Do you have difficulty driving, watching TV, or doing any of your daily activities because of your eyesight?: No  Have you had an eye exam within the past year?: Yes      Personalized Preventive Plan   Current Health Maintenance Status  Immunization History   Administered Date(s) Administered    Pneumococcal Conjugate 13-valent ([id number]) 09/12/2017    Td (Adult), 5 Lf Tetanus Toxoid, Pf (Tenivac, Decavac) 01/01/2007    Td, unspecified formulation 01/01/2007    Tdap (Boostrix, Adacel) 02/10/2015    Zoster Live (Zostavax) 03/27/2014      Pt cannot afford hearing aids, looking into different insurance plan for next year. On a fixed income.      Health Maintenance Topic Date Due    Hepatitis C screen  Never done    Diabetes screen  Never done    DEXA (modify frequency per FRAX score)  Never done    Breast cancer screen  09/18/2013    Shingles Vaccine (2 of 3) 05/22/2014    Pneumococcal 65+ years Vaccine (2 of 2 - PPSV23) 09/12/2018    Annual Wellness Visit (AWV)  Never done    TSH testing  07/24/2020    Potassium monitoring  05/28/2021    Creatinine monitoring  05/28/2021    Lipid screen  04/21/2022    DTaP/Tdap/Td vaccine (2 - Td) 02/10/2025    Colon cancer screen colonoscopy  09/23/2030    Hepatitis A vaccine  Aged Out    Hepatitis B vaccine  Aged Out    Hib vaccine  Aged Out    Meningococcal (ACWY) vaccine  Aged Out     Recommendations for [employer] Due: see orders and patient instructions/AVS.  . Recommended screening schedule for the next 5-10 years is provided to the patient in written form: see Patient Instructions/AVS.    Petros Muniz was seen today for medicare awv. Diagnoses and all orders for this visit:    Routine general medical examination at a health care facility  -     Comprehensive Metabolic Panel    ACP (advance care planning)  -     [address] [[id number]]  - Pt wants to be DNR. Will complete form at home, given to her at appt, and then drop off for healthcare provider to sign. Reviewed DNR paperwork together. Requested copy of adv direc, living will, HCPOA. Says family has copies as well. Asymptomatic menopausal state  -     DEXA Bone Density Axial Skeleton; Future    Encounter for screening mammogram for breast cancer  -     VERNA Digital Screen Bilateral [[id number]];  Future  - Information printed and reviewed    Essential hypertension  -     lisinopril-hydroCHLOROthiazide (PRINZIDE;ZESTORETIC) 20-25 MG per tablet; TAKE 1 TABLET EVERY DAY  - BP well controlled today  - Follow a low sodium diet  Physical activity 150 minutes weekly recommended   Weight loss, initial goal 10% of body weight recommended -     Comprehensive Metabolic Panel    Acquired hypothyroidism  -     thyroid (ARMOUR THYROID) 120 MG tablet; TAKE 1 TABLET EVERY DAY  -     Pt does not want drastic decrease in her medication because she feels lethargic if TSH levels are too high but pt TSH level has been <0.01 for over 1-2 years now. Discussed with pt potentially splitting pill in half for 1-2 doses per week if TSH level remains low  -     TSH with Reflex    At high risk for falls   Discussed home safety tips    Hypertriglyceridemia  -     Work on limiting saturated fats in diet, and eating a healthy balance of fruits, vegetables, lean proteins, and multigrains.  - Physical activity 150 minutes weekly recommended   - Weight loss, initial goal 10% of body weight recommended  -     Lipid Panel    Impaired fasting glucose  -     Hemoglobin A1C    Cervical herniated disc  -     gabapentin (NEURONTIN) 300 MG capsule; Take 1 capsule by mouth 3 times daily for 30 days. Take 300 mg by mouth 3 times daily.  - Pt to follow up with Dr Sruthi Silverio to discuss cortisone injection  -     meloxicam (MOBIC) 15 MG tablet; Take 1 tablet by mouth daily  -     tiZANidine (ZANAFLEX) 4 MG tablet; Take 1 tablet by mouth nightly as needed (spasms)   Follows with pain management- Dr Sruthi Silverio    Delayed gastric emptying  -     omeprazole (PRILOSEC) 40 MG delayed release capsule; TAKE 1 CAPSULE EVERY DAY  - Pt follows with GI PRN    DDD (degenerative disc disease), lumbar  -     meloxicam (MOBIC) 15 MG tablet; Take 1 tablet by mouth daily  -     tiZANidine (ZANAFLEX) 4 MG tablet;  Take 1 tablet by mouth nightly as needed (spasms)    Tobacco abuse  -     buPROPion (WELLBUTRIN SR) 150 MG extended release tablet; 1 in AM x 3 days and then 1 tab twice daily with 2nd dose prior to 4 pm  - Pt insurance does not cover chantix, successfully quit smoking on wellbutrin in past and requested this again    Encounter for tobacco use cessation counseling Reviewed the process of designating a competent adult as an Agent (or -in-fact) that could take make health care decisions for the patient if incompetent. Patient was asked to complete the declaration forms, either acknowledge the forms by a public notary or an eligible witness and provide a signed copy to the practice office. Time spent (minutes): 15    Pt wants to be DNR. Will complete form at home, given to her at appt, and then drop off for healthcare provider to sign. Reviewed DNR paperwork together. Requested copy of adv direc, living will, HCPOA. Says family has copies as well. Obesity Counseling: Assessed behavioral health risks and factors affecting choice of behavior. Suggested weight control approaches, including dietary changes behavioral modification and follow up plan. Provided educational and support documentation. Time spent (minutes): 15  Cardiovascular Disease Risk Counseling: Assessed the patient's risk to develop cardiovascular disease and reviewed main risk factors. Reviewed steps to reduce disease risk including:   · Quitting tobacco use, reducing amount smoked, or not starting the habit  · Making healthy food choices  · Being physically active and gradualy increasing activity levels   · Reduce weight and determine a healthy BMI goal  · Monitor blood pressure and treat if higher than 140/90 mmHg  · Maintain blood total cholesterol levels under 5 mmol/l or 190 mg/dl  · Maintain LDL cholesterol levels under 3.0 mmol/l or 115 mg/dl   · Control blood glucose levels  · Consider taking aspirin (75 mg daily), once blood pressure is controlled   Provided a follow up plan.   Time spent (minutes): 15    Hepatitis C Screening: Patient's exposure considered high risk due to none    Care Gaps Addressed  Hep C screen with bloodwork today  A1C today with IF[facility]  DEXA scan ordered  AWV today  Colonoscopy 2020  Due for mammogram- order given and educated Call insurance company to discuss coverage for shingles vaccine (Shingrix) 2 dose series   Declined PNA vaccine    I have reviewed patient's pertinent medical history, relevant laboratory and imaging studies, and past/future health maintenance. Discussed with the patient the importance of adhering to their current medication regimen as directed. Advised the patient that they should continue to work on eating a healthy balanced diet and staying active by exercising within their personal limits. Orders as listed above. Patient was advised to keep future appointments with their respective specialty care team(s). Patient had the opportunity to ask questions, all of which were answered to the best of my ability and with patient satisfaction. Patient understands and is agreeable with the care plan following today's visit. Patient is to schedule an appointment for any new or worsening symptoms. Go to ER for significant shortness of breath, chest pain, or uncontrolled pain or fever. I discussed with patient the risk and benefits of any medications that were prescribed today. I verified that the patient understands their medications, labs, and/or procedures. The patient is doing well with current medication regimen and does not have any barriers to adherence. The patient's self-management abilities are good.      Follow Up in 6 Months for HTN Follow Up

## 2021-03-04 LAB
ESTIMATED AVERAGE GLUCOSE: 116.9 MG/DL
HBA1C MFR BLD: 5.7 %

## 2021-03-04 NOTE — TELEPHONE ENCOUNTER
Patient mentioned wanting a cardiology referral during her visit but then we failed to address this by the completion of her visit.   Please reach out to patient and find out what she wanted cardiology for and where she would prefer to see cardiology at [facility] or if she has another cardiology in mind

## 2021-03-05 PROBLEM — R73.03 PREDIABETES: Status: ACTIVE | Noted: 2021-03-05

## 2021-03-06 ENCOUNTER — TELEPHONE (OUTPATIENT)
Dept: FAMILY MEDICINE CLINIC | Age: 71
End: 2021-03-06

## 2021-03-06 NOTE — TELEPHONE ENCOUNTER
Received a call from the patient this morning. She had been seen on 3/3 for her annual wellness visit and was found that she had an abscess to her breast.  She was started on Bactrim and educated the monitor. She called today stating that the abscess is opened up and its draining pus. She denies any fevers. She does state that the area is painful. I encouraged the patient that this is a positive occurrence. She should keep the area clean, allow it to drain, and dress as necessary. She will follow-up if symptoms worsen, or pussy drainage remains present following completion of the antibiotic.

## 2021-03-08 ENCOUNTER — TELEPHONE (OUTPATIENT)
Dept: FAMILY MEDICINE CLINIC | Age: 71
End: 2021-03-08

## 2021-03-08 DIAGNOSIS — N61.1 ABSCESS OF RIGHT BREAST: ICD-10-CM

## 2021-03-08 RX ORDER — SULFAMETHOXAZOLE AND TRIMETHOPRIM 800; 160 MG/1; MG/1
1 TABLET ORAL 2 TIMES DAILY
Qty: 6 TABLET | Refills: 0 | Status: SHIPPED | OUTPATIENT
Start: 2021-03-08 | End: 2021-03-09 | Stop reason: SDUPTHER

## 2021-03-08 NOTE — TELEPHONE ENCOUNTER
CALLING FOR LAB RESULTS AND SHE NEEDS AN ANTIBIOTIC REORDER FOR HER ABCESS -- IT IS STILL SEEPING       SULFAMETHOXIZOLE - TRIMETHOPRIM      Nitin Branham [address] & [facility], [address] - P [phone] - F [phone]

## 2021-03-08 NOTE — TELEPHONE ENCOUNTER
Sent 3 more days.  If still present after that, will need referral to general surgery for possible incision and drainage

## 2021-03-08 NOTE — TELEPHONE ENCOUNTER
SPOKE TO PT, THE BREAST WOUND IS IMPROVING BUT STILL SEEPING AFTER FINISHING HER ANTIBIOTIC. PT CAN'T GET HER MAMMOGRAM UNTIL THIS HEALS UP MORE.  SHE WANTS TO KNOW IF SHE NEEDS ANOTHER ROUND OF ANTIBIOTICS TO GET THIS HEALED UP BETTER. I DISCUSSED HER LABS WITH HER AND SHE WANTS TO WAIT ON THE CHOLESTEROL MED UNTIL SHE SEES CARDIOLOGY.   [facility]

## 2021-03-09 RX ORDER — SULFAMETHOXAZOLE AND TRIMETHOPRIM 800; 160 MG/1; MG/1
1 TABLET ORAL 2 TIMES DAILY
Qty: 6 TABLET | Refills: 0 | Status: SHIPPED | OUTPATIENT
Start: 2021-03-09 | End: 2021-03-12

## 2021-03-09 NOTE — TELEPHONE ENCOUNTER
SCRIPT WAS SENT TO THE WRONG PHARMACY, RESENT TO [facility].     CALLED PT AND LEFT A DETAILED MESSAGE ADVISIGN HS

## 2021-03-09 NOTE — TELEPHONE ENCOUNTER
PT CALLING THE ANTIBIOTIC ISN'T AT THE PHARMACY -  THEY SAY THEY DON'T HAVE IT - PLEASE CALL IN AGAIN -- ASAP - SHE IS WANTING TO  IN THE NEXT HOUR

## 2021-03-12 ENCOUNTER — OFFICE VISIT (OUTPATIENT)
Dept: FAMILY MEDICINE CLINIC | Age: 71
End: 2021-03-12
Payer: MEDICARE

## 2021-03-12 ENCOUNTER — TELEPHONE (OUTPATIENT)
Dept: FAMILY MEDICINE CLINIC | Age: 71
End: 2021-03-12

## 2021-03-12 VITALS
OXYGEN SATURATION: 96 % | HEIGHT: 62 IN | BODY MASS INDEX: 39.75 KG/M2 | DIASTOLIC BLOOD PRESSURE: 70 MMHG | SYSTOLIC BLOOD PRESSURE: 112 MMHG | HEART RATE: 97 BPM | RESPIRATION RATE: 12 BRPM | TEMPERATURE: 97.2 F | WEIGHT: 216 LBS

## 2021-03-12 DIAGNOSIS — N61.1 ABSCESS OF RIGHT BREAST: Primary | ICD-10-CM

## 2021-03-12 PROCEDURE — 1090F PRES/ABSN URINE INCON ASSESS: CPT | Performed by: NURSE PRACTITIONER

## 2021-03-12 PROCEDURE — 4040F PNEUMOC VAC/ADMIN/RCVD: CPT | Performed by: NURSE PRACTITIONER

## 2021-03-12 PROCEDURE — G8400 PT W/DXA NO RESULTS DOC: HCPCS | Performed by: NURSE PRACTITIONER

## 2021-03-12 PROCEDURE — G8417 CALC BMI ABV UP PARAM F/U: HCPCS | Performed by: NURSE PRACTITIONER

## 2021-03-12 PROCEDURE — G8427 DOCREV CUR MEDS BY ELIG CLIN: HCPCS | Performed by: NURSE PRACTITIONER

## 2021-03-12 PROCEDURE — 3017F COLORECTAL CA SCREEN DOC REV: CPT | Performed by: NURSE PRACTITIONER

## 2021-03-12 PROCEDURE — 99213 OFFICE O/P EST LOW 20 MIN: CPT | Performed by: NURSE PRACTITIONER

## 2021-03-12 PROCEDURE — G8483 FLU IMM NO ADMIN DOC REA: HCPCS | Performed by: NURSE PRACTITIONER

## 2021-03-12 PROCEDURE — 1123F ACP DISCUSS/DSCN MKR DOCD: CPT | Performed by: NURSE PRACTITIONER

## 2021-03-12 PROCEDURE — 4004F PT TOBACCO SCREEN RCVD TLK: CPT | Performed by: NURSE PRACTITIONER

## 2021-03-12 NOTE — PROGRESS NOTES
Mark Stone (:  1950) is a 72 y.o. female,Established patient, here for evaluation of the following chief complaint(s):  Breast Problem (BOIL ON RIGHT BREAST X 2 WEEKS; GIVEN ANTIBIOTICS BY [facility]; GAVE 3 MORE PILLS BY [facility] THIS WEEKEND; PATIENT IS STILL HAVE LEAKAGE AND A MASS THERE)      ASSESSMENT/PLAN:  1. Abscess of right breast  -Provide reassurance to the patient that the abscess is healing as expected. She can expect some occasional purulent drainage as its healing. The cyst area is clean. There is no erythema. No drainage at this time. No additional antibiotic indicated. She has been educated to follow-up as needed if redness were to develop, the abscess started to reappear, she develops a fever, or drainage significantly increased. No follow-ups on file. SUBJECTIVE/OBJECTIVE:  KIMBERLI  Oscar Almaraz presents today for evaluation of an abscess to her right breast.  She had been seen previously in the office for this. She had called me over the weekend and stated that the abscess started draining. She states that continued to drain over the weekend. She was given an additional 3 days of Bactrim in addition to the already prescribed amount. She states that she is going to be finishing the antibiotic tonight. She had a tiny bit of purulent drainage today and wanted to have it checked given that she was going be done with antibiotic. Denies any fever or chills. Review of Systems   Constitutional: Negative for chills and fever. Skin:        Boil under the right breast   Neurological: Negative for dizziness, weakness, light-headedness, numbness and headaches. Physical Exam  Constitutional:       General: She is not in acute distress. Appearance: Normal appearance. She is obese. Skin:     Comments: Healing abscess to the inferior aspect of the right breast as seen previous. Clean and dry without erythema or drainage. No tunneling.    Neurological:      Mental Status: She is alert and oriented to person, place, and time. Psychiatric:         Mood and Affect: Mood normal.         Behavior: Behavior normal.         Thought Content: Thought content normal.         Judgment: Judgment normal.           On this date 3/12/2021 I have spent 20 minutes reviewing previous notes, test results and face to face with the patient discussing the diagnosis and importance of compliance with the treatment plan as well as documenting on the day of the visit. An electronic signature was used to authenticate this note.     --Sarah Doty, PRAMOD - CNP

## 2021-03-12 NOTE — TELEPHONE ENCOUNTER
Patient has been on antibiotics. Draining had stopped , area was black and blue. Just checked it and there is pus again, has like a quevedo again. Is very concerned. Made appointment to be seen today at 1:20 with Shayne Shore.

## 2021-05-11 DIAGNOSIS — M50.20 CERVICAL HERNIATED DISC: ICD-10-CM

## 2021-05-11 DIAGNOSIS — M51.36 DDD (DEGENERATIVE DISC DISEASE), LUMBAR: ICD-10-CM

## 2021-05-11 RX ORDER — MELOXICAM 15 MG/1
TABLET ORAL
Qty: 30 TABLET | Refills: 3 | Status: SHIPPED | OUTPATIENT
Start: 2021-05-11 | End: 2022-02-25 | Stop reason: SDUPTHER

## 2021-05-11 RX ORDER — TIZANIDINE 4 MG/1
4 TABLET ORAL NIGHTLY PRN
Qty: 90 TABLET | Refills: 0 | Status: SHIPPED | OUTPATIENT
Start: 2021-05-11 | End: 2022-02-25 | Stop reason: SDUPTHER

## 2021-06-03 ENCOUNTER — HOSPITAL ENCOUNTER (OUTPATIENT)
Dept: MRI IMAGING | Age: 71
Discharge: HOME | End: 2021-06-03
Payer: MEDICARE

## 2021-06-03 DIAGNOSIS — M54.12 CERVICAL RADICULOPATHY: ICD-10-CM

## 2021-06-03 PROCEDURE — 72141 MRI NECK SPINE W/O DYE: CPT

## 2021-08-18 ENCOUNTER — OFFICE VISIT (OUTPATIENT)
Dept: FAMILY MEDICINE CLINIC | Age: 71
End: 2021-08-18
Payer: MEDICARE

## 2021-08-18 VITALS
HEIGHT: 62 IN | BODY MASS INDEX: 39.38 KG/M2 | DIASTOLIC BLOOD PRESSURE: 72 MMHG | TEMPERATURE: 97.3 F | SYSTOLIC BLOOD PRESSURE: 116 MMHG | HEART RATE: 66 BPM | OXYGEN SATURATION: 97 % | WEIGHT: 214 LBS

## 2021-08-18 DIAGNOSIS — R60.1 GENERALIZED EDEMA: ICD-10-CM

## 2021-08-18 DIAGNOSIS — E03.9 ACQUIRED HYPOTHYROIDISM: ICD-10-CM

## 2021-08-18 DIAGNOSIS — M54.12 CERVICAL RADICULOPATHY: Primary | ICD-10-CM

## 2021-08-18 DIAGNOSIS — M25.59 PAIN IN OTHER SPECIFIED JOINT: ICD-10-CM

## 2021-08-18 DIAGNOSIS — Z01.818 PREOPERATIVE TESTING: ICD-10-CM

## 2021-08-18 DIAGNOSIS — Z01.818 PRE-OP EXAM: ICD-10-CM

## 2021-08-18 LAB
ANION GAP SERPL CALCULATED.3IONS-SCNC: 16 MMOL/L (ref 3–16)
APTT: 34.9 SEC (ref 26.2–38.6)
BASOPHILS ABSOLUTE: 0.1 K/UL (ref 0–0.2)
BASOPHILS RELATIVE PERCENT: 0.9 %
BUN BLDV-MCNC: 12 MG/DL (ref 7–20)
CALCIUM SERPL-MCNC: 9.7 MG/DL (ref 8.3–10.6)
CHLORIDE BLD-SCNC: 104 MMOL/L (ref 99–110)
CO2: 22 MMOL/L (ref 21–32)
CREAT SERPL-MCNC: 0.6 MG/DL (ref 0.6–1.2)
EOSINOPHILS ABSOLUTE: 0.2 K/UL (ref 0–0.6)
EOSINOPHILS RELATIVE PERCENT: 2 %
GFR AFRICAN AMERICAN: >60
GFR NON-AFRICAN AMERICAN: >60
GLUCOSE BLD-MCNC: 99 MG/DL (ref 70–99)
HCT VFR BLD CALC: 40.3 % (ref 36–48)
HEMOGLOBIN: 13.4 G/DL (ref 12–16)
INR BLD: 0.97 (ref 0.88–1.12)
LYMPHOCYTES ABSOLUTE: 2.1 K/UL (ref 1–5.1)
LYMPHOCYTES RELATIVE PERCENT: 27.1 %
MCH RBC QN AUTO: 28.9 PG (ref 26–34)
MCHC RBC AUTO-ENTMCNC: 33.4 G/DL (ref 31–36)
MCV RBC AUTO: 86.4 FL (ref 80–100)
MONOCYTES ABSOLUTE: 0.4 K/UL (ref 0–1.3)
MONOCYTES RELATIVE PERCENT: 4.9 %
NEUTROPHILS ABSOLUTE: 5.1 K/UL (ref 1.7–7.7)
NEUTROPHILS RELATIVE PERCENT: 65.1 %
PDW BLD-RTO: 13.8 % (ref 12.4–15.4)
PLATELET # BLD: 344 K/UL (ref 135–450)
PMV BLD AUTO: 9.1 FL (ref 5–10.5)
POTASSIUM SERPL-SCNC: 4.1 MMOL/L (ref 3.5–5.1)
PROTHROMBIN TIME: 11 SEC (ref 9.9–12.7)
RBC # BLD: 4.66 M/UL (ref 4–5.2)
SODIUM BLD-SCNC: 142 MMOL/L (ref 136–145)
TSH REFLEX: 0.68 UIU/ML (ref 0.27–4.2)
WBC # BLD: 7.9 K/UL (ref 4–11)

## 2021-08-18 PROCEDURE — 3017F COLORECTAL CA SCREEN DOC REV: CPT | Performed by: NURSE PRACTITIONER

## 2021-08-18 PROCEDURE — G8400 PT W/DXA NO RESULTS DOC: HCPCS | Performed by: NURSE PRACTITIONER

## 2021-08-18 PROCEDURE — 1123F ACP DISCUSS/DSCN MKR DOCD: CPT | Performed by: NURSE PRACTITIONER

## 2021-08-18 PROCEDURE — 1090F PRES/ABSN URINE INCON ASSESS: CPT | Performed by: NURSE PRACTITIONER

## 2021-08-18 PROCEDURE — G8427 DOCREV CUR MEDS BY ELIG CLIN: HCPCS | Performed by: NURSE PRACTITIONER

## 2021-08-18 PROCEDURE — 93000 ELECTROCARDIOGRAM COMPLETE: CPT | Performed by: NURSE PRACTITIONER

## 2021-08-18 PROCEDURE — 4040F PNEUMOC VAC/ADMIN/RCVD: CPT | Performed by: NURSE PRACTITIONER

## 2021-08-18 PROCEDURE — 99214 OFFICE O/P EST MOD 30 MIN: CPT | Performed by: NURSE PRACTITIONER

## 2021-08-18 PROCEDURE — 1036F TOBACCO NON-USER: CPT | Performed by: NURSE PRACTITIONER

## 2021-08-18 PROCEDURE — 36415 COLL VENOUS BLD VENIPUNCTURE: CPT | Performed by: NURSE PRACTITIONER

## 2021-08-18 PROCEDURE — G8417 CALC BMI ABV UP PARAM F/U: HCPCS | Performed by: NURSE PRACTITIONER

## 2021-08-18 SDOH — ECONOMIC STABILITY: TRANSPORTATION INSECURITY
IN THE PAST 12 MONTHS, HAS LACK OF TRANSPORTATION KEPT YOU FROM MEETINGS, WORK, OR FROM GETTING THINGS NEEDED FOR DAILY LIVING?: NO

## 2021-08-18 SDOH — ECONOMIC STABILITY: FOOD INSECURITY: WITHIN THE PAST 12 MONTHS, YOU WORRIED THAT YOUR FOOD WOULD RUN OUT BEFORE YOU GOT MONEY TO BUY MORE.: NEVER TRUE

## 2021-08-18 SDOH — ECONOMIC STABILITY: FOOD INSECURITY: WITHIN THE PAST 12 MONTHS, THE FOOD YOU BOUGHT JUST DIDN'T LAST AND YOU DIDN'T HAVE MONEY TO GET MORE.: NEVER TRUE

## 2021-08-18 SDOH — ECONOMIC STABILITY: TRANSPORTATION INSECURITY
IN THE PAST 12 MONTHS, HAS THE LACK OF TRANSPORTATION KEPT YOU FROM MEDICAL APPOINTMENTS OR FROM GETTING MEDICATIONS?: NO

## 2021-08-18 ASSESSMENT — SOCIAL DETERMINANTS OF HEALTH (SDOH): HOW HARD IS IT FOR YOU TO PAY FOR THE VERY BASICS LIKE FOOD, HOUSING, MEDICAL CARE, AND HEATING?: NOT HARD AT ALL

## 2021-08-18 NOTE — PROGRESS NOTES
Preoperative Consultation      Mindy Rojas  YOB: 1950    Date of Service:  8/18/2021    Vitals:    08/18/21 1018   BP: 116/72   Site: Left Upper Arm   Position: Sitting   Cuff Size: Large Adult   Pulse: 66   Temp: 97.3 °F (36.3 °C)   TempSrc: Temporal   SpO2: 97%   Weight: 214 lb (97.1 kg)   Height: 5' 2\" (1.575 m)      Wt Readings from Last 2 Encounters:   08/18/21 214 lb (97.1 kg)   03/12/21 216 lb (98 kg)     BP Readings from Last 3 Encounters:   08/18/21 116/72   03/12/21 112/70   03/03/21 112/68        Chief Complaint   Patient presents with    Other     ACP    Pre-op Exam     PT HAVING SURGERY ON 9/2/21 AT [address] [place] [address] WITH DR ESTRADA FOR NECK SURGERY/FUSION      Allergies   Allergen Reactions    Codeine      delusions    Prednisone Nausea Only, Palpitations and Other (See Comments)     Dizziness, swollen eye,heart paplitations     Outpatient Medications Marked as Taking for the 8/18/21 encounter (Office Visit) with PRAMOD Westbrook CNP   Medication Sig Dispense Refill    tiZANidine (ZANAFLEX) 4 MG tablet TAKE 1 TABLET BY MOUTH NIGHTLY AS NEEDED (SPASMS) 90 tablet 0    meloxicam (MOBIC) 15 MG tablet TAKE 1 TABLET BY MOUTH EVERY DAY 30 tablet 3    gabapentin (NEURONTIN) 300 MG capsule Take 1 capsule by mouth 3 times daily for 30 days. Take 300 mg by mouth 3 times daily. 90 capsule 0    lisinopril-hydroCHLOROthiazide (PRINZIDE;ZESTORETIC) 20-25 MG per tablet TAKE 1 TABLET EVERY DAY 90 tablet 3    omeprazole (PRILOSEC) 40 MG delayed release capsule TAKE 1 CAPSULE EVERY DAY 90 capsule 3    estradiol (ESTRACE) 1 MG tablet Take 1 tablet by mouth daily 90 tablet 3    thyroid (ARMOUR THYROID) 120 MG tablet TAKE 1 TABLET EVERY DAY except on Monday and Thursday, take only 1/2 tablet. Repeat thyroid levels in May. 78 tablet 3    Magnesium 200 MG CHEW Take by mouth      calcipotriene (DOVONEX) 0.005 % cream Apply topically 2 times daily.  1 Tube 5    Specialty Vitamins Products (MM BIOTIN/KERATIN PO) Take by mouth      Cholecalciferol (VITAMIN D3) 2000 UNITS TABS Take 2 tablets by mouth daily      Omega-3 Fatty Acids (FISH OIL) 1000 MG CAPS Take 3,000 mg by mouth daily         This patient presents to the office today for a preoperative consultation at the request of surgeon, Dr. Carissa Agarwal, who plans on performing C4-C7 ACDF on September 2 at [facility]. The current problem began multiple years ago, but symptoms have been worsening over the past 5 years. Conservative therapy: Yes: injections, PT, which has been ineffective.     Planned anesthesia: General   Known anesthesia problems: None   Bleeding risk: No recent or remote history of abnormal bleeding  Personal or FH of DVT/PE: No    Patient objection to receiving blood products: No    Patient Active Problem List   Diagnosis    Hypothyroid    Hypertension    Knee pain    Right knee pain    Delayed gastric emptying    Effusion of left knee    Plantar fasciitis of left foot    Closed displaced fracture of distal phalanx of left great toe with routine healing    Prediabetes    Edema    BPPV (benign paroxysmal positional vertigo)       Past Medical History:   Diagnosis Date    Delayed gastric emptying 03/27/2014    Hayfever     Hypertension     Hypothyroid 04/20/2011    Prediabetes 03/05/2021    A1C 5.7    Right knee pain 07/12/2013    Sleep apnea     CPAP     Past Surgical History:   Procedure Laterality Date    ADENOIDECTOMY      AS A CHILD    APPENDECTOMY      CERVICAL SPINE SURGERY N/A 7/8/2020    C6C7 INTERLAMINAR EPIDURAL STEROID INJECTION WITH FLUOROSCOPY performed by Giorgio Vincent MD at [address] [id number]  2008    normal    COLONOSCOPY  9/23/2020    COLONOSCOPY POLYPECTOMY SNARE/COLD BIOPSY performed by Sil Sharpe MD at [address] [facility] N/A 4/19/2019    L4/L5 INTERLAMINAR EPIDURAL STEROID INJECTION WITH FLUOROSCOPY performed by Cortney YOU Adry Pineda MD at [place] [address] ovaries    KNEE SURGERY      Broken right patella    PAIN MANAGEMENT PROCEDURE N/A 2020    L4L5 INTERLAMINAR EPIDURAL STEROID INJECTION WITH FLUOROSCOPY performed by Shirley Siegel MD at [address] [place] [address] N/A 2020    MIDLINE L4L5 INTERLAMINAR EPIDURAL STEROID INJECTION WITH FLUOROSCOPY performed by Shirley Siegel MD at [address] [place] [address] J    UPPER GASTROINTESTINAL ENDOSCOPY N/A 2020    EGD DILATION BALLOON performed by Ary Velazquez MD at [facility] EXTRACTION       Family History   Problem Relation Age of Onset    Dementia Mother     Mental Illness Mother     High Blood Pressure Mother     Heart Disease Father     High Blood Pressure Father     Stroke Maternal Grandmother     Heart Disease Other         dad's family     Social History     Socioeconomic History    Marital status:      Spouse name: Not on file    Number of children: Not on file    Years of education: Not on file    Highest education level: Not on file   Occupational History    Not on file   Tobacco Use    Smoking status: Former Smoker     Packs/day: 1.00     Years: 22.00     Pack years: 22.00     Quit date: 2021     Years since quittin.1    Smokeless tobacco: Never Used    Tobacco comment: 9-10 per day.  Quit successfully and restarted during 2020   Vaping Use    Vaping Use: Never used   Substance and Sexual Activity    Alcohol use: No     Alcohol/week: 0.0 standard drinks    Drug use: No    Sexual activity: Not on file   Other Topics Concern    Not on file   Social History Narrative    Caffeine: SODA - 0 TEA - 0  COFFEE - 1.5 cups in the morning         Social Determinants of Health     Financial Resource Strain: Low Risk     Difficulty of Paying Living Expenses: Not hard at all   Food Insecurity: No Food Insecurity    Worried About Running Out of Food in the Last strength. No cranial nerve deficit or sensory deficit. Coordination and gait normal.   Skin: Skin is warm and dry. No rash noted. No erythema. Psychiatric: She has a normal mood and affect. Her behavior is normal.     EKG Interpretation:  normal EKG, normal sinus rhythm, unchanged from previous tracings. Lab Review   Office Visit on 03/03/2021   Component Date Value    Hemoglobin A1C 03/03/2021 5.7     eAG 03/03/2021 116.9     TSH 03/03/2021 0.08*    Cholesterol, Total 03/03/2021 201*    Triglycerides 03/03/2021 119     HDL 03/03/2021 58     LDL Calculated 03/03/2021 119*    VLDL Cholesterol Calcula* 03/03/2021 24     Sodium 03/03/2021 142     Potassium 03/03/2021 4.2     Chloride 03/03/2021 104     CO2 03/03/2021 28     Anion Gap 03/03/2021 10     Glucose 03/03/2021 91     BUN 03/03/2021 13     CREATININE 03/03/2021 0.6     GFR Non- 03/03/2021 >60     GFR  03/03/2021 >60     Calcium 03/03/2021 9.4     Total Protein 03/03/2021 7.2     Albumin 03/03/2021 3.9     Albumin/Globulin Ratio 03/03/2021 1.2     Total Bilirubin 03/03/2021 0.3     Alkaline Phosphatase 03/03/2021 103     ALT 03/03/2021 14     AST 03/03/2021 14*    Globulin 03/03/2021 3.3     Hep C Ab Interp 03/03/2021 Non-reactive     T4 Free 03/03/2021 1.1     T3, Total 03/03/2021 2.53*           Assessment:       80 y.o. patient with planned surgery as above. Known risk factors for perioperative complications: Hypertension, Obstructive sleep apnea  Current medications which may produce withdrawal symptoms if withheld perioperatively: none      Plan:   1. Cervical radiculopathy  -Cleared for surgery  - Basic Metabolic Panel; Future  - CBC Auto Differential; Future  - APTT; Future  - Protime-INR; Future  - Basic Metabolic Panel  - Protime-INR  - APTT  - CBC Auto Differential    2. Pre-op exam  1.  Preoperative workup as follows: ECG, hemoglobin, hematocrit, electrolytes, creatinine, glucose, coagulation studies  2. Change in medication regimen before surgery: Discontinue NSAIDs (meloxicam, ibuprofen, aleve) 7 days before surgery, Discontinue fish oil 7 days before surgery  3. Prophylaxis for cardiac events with perioperative beta-blockers: Not indicated  ACC/AHA indications for pre-operative beta-blocker use:    · Vascular surgery with history of postitive stress test  · Intermediate or high risk surgery with history of CAD   · Intermediate or high risk surgery with multiple clinical predictors of CAD- 2 of the following: history of compensated or prior heart failure, history of cerebrovascular disease, DM, or renal insufficiency    Routine administration of higher-dose, long-acting metoprolol in beta-blockernaïve patients on the day of surgery, and in the absence of dose titration is associated with an overall increase in mortality. Beta-blockers should be started days to weeks prior to surgery and titrated to pulse < 70.  4. Deep vein thrombosis prophylaxis: regimen to be chosen by surgical team  5. No contraindications to planned surgery  - EKG 12 Lead    3. Preoperative testing  -Preop testing as requested by surgical team  - Basic Metabolic Panel; Future  - CBC Auto Differential; Future  - APTT; Future  - Protime-INR; Future  - Basic Metabolic Panel  - Protime-INR  - APTT  - CBC Auto Differential    4. Acquired hypothyroidism  -Patient to change dose based upon previous results. Tolerating dose change without issue. Repeat TSH with lab work  - TSH with Reflex; Future  - TSH with Reflex    5. Pain in other specified joint   - APTT; Future  - APTT    6. Generalized edema   -We will obtain labs as above to check for any underlying causes. Patient has been getting frequent steroid injections. Monitor following surgery and follow-up as needed. Already on Prinzide.  - Protime-INR; Future  - Protime-INR           This dictation was generated by voice recognition computer software.   Although all attempts are made to edit the dictation for accuracy, there may be errors in the transcription that are not intended. An electronic signature was used to authenticate this note.     --Delene Boxer, APRN - CNP

## 2021-09-07 ENCOUNTER — TELEPHONE (OUTPATIENT)
Dept: FAMILY MEDICINE CLINIC | Age: 71
End: 2021-09-07

## 2021-09-07 NOTE — TELEPHONE ENCOUNTER
PT @ [phone]    SHE IS ON LISINOPRIL  -  FINGERS ARE NUMB SINCE SURGERY LAST WEEK - HER BP WAS ELEVATED /110 THEY GOT IT TO COME DOWN ENOUGH FOR SUGERY   WHENEVER SHE IS IN PAIN OR UPSET (WHICH SHE IS BOTH RIGHT NOW) HER BP SHOOTS UP -  SHE IS WANTING TO SPEAK TO DR. EDWARDS ABOUT ADDING ANOTHER BP MEDICATION    PLEASE CALL

## 2021-09-08 ENCOUNTER — VIRTUAL VISIT (OUTPATIENT)
Dept: FAMILY MEDICINE CLINIC | Age: 71
End: 2021-09-08
Payer: MEDICARE

## 2021-09-08 DIAGNOSIS — I10 ESSENTIAL HYPERTENSION: Primary | ICD-10-CM

## 2021-09-08 DIAGNOSIS — H81.10 BENIGN PAROXYSMAL POSITIONAL VERTIGO, UNSPECIFIED LATERALITY: ICD-10-CM

## 2021-09-08 DIAGNOSIS — M50.30 DDD (DEGENERATIVE DISC DISEASE), CERVICAL: ICD-10-CM

## 2021-09-08 DIAGNOSIS — M25.562 LEFT KNEE PAIN, UNSPECIFIED CHRONICITY: ICD-10-CM

## 2021-09-08 DIAGNOSIS — E03.9 ACQUIRED HYPOTHYROIDISM: ICD-10-CM

## 2021-09-08 PROCEDURE — 99442 PR PHYS/QHP TELEPHONE EVALUATION 11-20 MIN: CPT | Performed by: FAMILY MEDICINE

## 2021-09-08 RX ORDER — METOPROLOL SUCCINATE 50 MG/1
50 TABLET, EXTENDED RELEASE ORAL DAILY
Qty: 30 TABLET | Refills: 2 | Status: SHIPPED | OUTPATIENT
Start: 2021-09-08 | End: 2022-02-25

## 2021-09-08 NOTE — PROGRESS NOTES
tablet TAKE 1 TABLET EVERY DAY  Rayne GaribayPRAMOD CNP   omeprazole (PRILOSEC) 40 MG delayed release capsule TAKE 1 CAPSULE EVERY DAY  Rayne Garibay APRMARGIE Loredo CNP   estradiol (ESTRACE) 1 MG tablet Take 1 tablet by mouth daily  Rayne GaribayPRAMOD CNP   thyroid [facility] THYROID) 120 MG tablet TAKE 1 TABLET EVERY DAY except on Monday and Thursday, take only 1/2 tablet. Repeat thyroid levels in May. Rayne GaribayPRAMOD CNP   Magnesium 200 MG CHEW Take by mouth  Historical Provider, MD   calcipotriene (DOVONEX) 0.005 % cream Apply topically 2 times daily. Arlyne Gosselin, MD   Specialty Vitamins Products (MM BIOTIN/KERATIN PO) Take by mouth  Historical Provider, MD   Cholecalciferol (VITAMIN D3) 2000 UNITS TABS Take 2 tablets by mouth daily  Historical Provider, MD   Omega-3 Fatty Acids (FISH OIL) 1000 MG CAPS Take 3,000 mg by mouth daily  Historical Provider, MD       Social History     Tobacco Use    Smoking status: Former Smoker     Packs/day: 1.00     Years: 22.00     Pack years: 22.00     Quit date: 2021     Years since quittin.1    Smokeless tobacco: Never Used    Tobacco comment: 9-10 per day. Quit successfully and restarted during 2020   Vaping Use    Vaping Use: Never used   Substance Use Topics    Alcohol use: No     Alcohol/week: 0.0 standard drinks    Drug use: No        ASSESSMENT/PLAN:   Diagnosis Orders   1. Essential hypertension     2. DDD (degenerative disc disease), cervical     3. Benign paroxysmal positional vertigo, unspecified laterality     4. Acquired hypothyroidism     5.  Left knee pain, unspecified chronicity       Recovering from cervical fusion  Had elevated bp off acei in surgery - previous to this bp readings were in normal range  Fluid retention possibly connected to steroid use  Limit sodium and drink plenty of water  Cont prinizide 20/25 daily and add metoprolol xl 50 mg daily to help w/ periodic high bp readings - se d/w pt  Monitor bp/ hr and f/u cardiology in 5 days for further evaluation  Thyroid stable on present dose  mmg in near future      Unable to connect video -converted to audio visit  Annelise Epstein, was evaluated through a synchronous (real-time) audio-video encounter. The patient (or guardian if applicable) is aware that this is a billable service. Verbal consent to proceed has been obtained within the past 12 months. The visit was conducted pursuant to the emergency declaration under the [address] [place] [address], [address] [place] [address] and the [employer] and [employer] General Act. Patient identification was verified, and a caregiver was present when appropriate. The patient was located in a state where the provider was credentialed to provide care. Total time spent on this encounter: 11-20 minutes were spent on the digital evaluation and management of this patient. --Ermelinda Garza MD on 9/8/2021 at 2:01 PM    An electronic signature was used to authenticate this note.

## 2021-12-13 ENCOUNTER — TELEPHONE (OUTPATIENT)
Dept: FAMILY MEDICINE CLINIC | Age: 71
End: 2021-12-13

## 2021-12-13 NOTE — TELEPHONE ENCOUNTER
----- Message from Amos sent at 12/13/2021 10:56 AM EST -----  Subject: Appointment Request    Reason for Call: Routine ED Follow Up Visit    QUESTIONS  Type of Appointment? Established Patient  Reason for appointment request? Available appointments did not meet   patient need  Additional Information for Provider? Pt needs [facility] ED 12/12   f/u, dehydrated w/cold symptoms. Pt requesting to be seen or vv this week;   going out of town next week. No availability through   ---------------------------------------------------------------------------  --------------  CALL BACK INFO  What is the best way for the office to contact you? OK to leave message on   voicemail  Preferred Call Back Phone Number? [phone]  ---------------------------------------------------------------------------  --------------  SCRIPT ANSWERS  Relationship to Patient? Self  (Patient requests to see provider urgently. )? No  Do you have any questions for your primary care provider that need to be   answered prior to your appointment? No  Have you been diagnosed with, awaiting test results for, or told that you   are suspected of having COVID-19 (Coronavirus)? (If patient has tested   negative or was tested as a requirement for work, school, or travel and   not based on symptoms, answer no)? No  Within the past two weeks have you developed any of the following symptoms   (answer no if symptoms have been present longer than 2 weeks or began   more than 2 weeks ago)? Fever or Chills, Cough, Shortness of breath or   difficulty breathing, Loss of taste or smell, Sore throat, Nasal   congestion, Sneezing or runny nose, Fatigue or generalized body aches   (answer no if pain is specific to a body part e.g. back pain), Diarrhea,   Headache?  Yes

## 2021-12-14 ENCOUNTER — VIRTUAL VISIT (OUTPATIENT)
Dept: FAMILY MEDICINE CLINIC | Age: 71
End: 2021-12-14
Payer: MEDICARE

## 2021-12-14 DIAGNOSIS — R42 DIZZY: ICD-10-CM

## 2021-12-14 DIAGNOSIS — M50.20 CERVICAL HERNIATED DISC: ICD-10-CM

## 2021-12-14 DIAGNOSIS — E86.0 DEHYDRATION: Primary | ICD-10-CM

## 2021-12-14 DIAGNOSIS — R51.9 ACUTE NONINTRACTABLE HEADACHE, UNSPECIFIED HEADACHE TYPE: ICD-10-CM

## 2021-12-14 DIAGNOSIS — J06.9 VIRAL URI: ICD-10-CM

## 2021-12-14 DIAGNOSIS — I10 ESSENTIAL HYPERTENSION: ICD-10-CM

## 2021-12-14 PROCEDURE — 99443 PR PHYS/QHP TELEPHONE EVALUATION 21-30 MIN: CPT | Performed by: FAMILY MEDICINE

## 2021-12-14 RX ORDER — GABAPENTIN 300 MG/1
300 CAPSULE ORAL 3 TIMES DAILY
Qty: 90 CAPSULE | Refills: 2 | Status: SHIPPED | OUTPATIENT
Start: 2021-12-14 | End: 2021-12-15 | Stop reason: SDUPTHER

## 2021-12-14 NOTE — PROGRESS NOTES
2021    TELEHEALTH EVALUATION -- Audio/Visual (During WQIVE-27 public health emergency)    HPI:    Concepción Ballesteros (:  1950) has requested an audio/video evaluation for the following concern(s):    Felt drunk / weak  Seen in ER  for ha/ dizzy - reviewed note  Not eating - responded well to 1 liter iv fluids  Using drinking 48 oz  Feeling better overall  Sinus sxs improved  On augmentin and not eating/ drinking much for sinusitis w/ mucinex d - may have gotten dehyrated from this  Switched to zpak in ER  covid testing negative  On toprol/ prinizide for bp - sbp 125 in ER  Reviewed cardio note - dr. Alex Delgadillo   Stress echo planned - on prinizide w/ metoprolol prn  Plans to drive to [place] next week and wants to be safe travelling  Nerves in neck - s/p fusion earlier in year  Needs refill on gabapentin    BP Readings from Last 3 Encounters:   21 116/72   21 112/70   21 112/68     Pulse Readings from Last 3 Encounters:   21 66   21 97   21 70     Wt Readings from Last 3 Encounters:   21 214 lb (97.1 kg)   21 216 lb (98 kg)   21 220 lb 9.6 oz (100.1 kg)         Review of Systems    Prior to Visit Medications    Medication Sig Taking? Authorizing Provider   metoprolol succinate (TOPROL XL) 50 MG extended release tablet Take 1 tablet by mouth daily  Raphael Rogel MD   tiZANidine (ZANAFLEX) 4 MG tablet TAKE 1 TABLET BY MOUTH NIGHTLY AS NEEDED (SPASMS)  Raphael Rogel MD   meloxicam (MOBIC) 15 MG tablet TAKE 1 TABLET BY MOUTH EVERY DAY  Raphael Rogel MD   gabapentin (NEURONTIN) 300 MG capsule Take 1 capsule by mouth 3 times daily for 30 days. Take 300 mg by mouth 3 times daily.   Patient not taking: Reported on 2021  PRAMOD Padilla CNP   lisinopril-hydroCHLOROthiazide (PRINZIDE;ZESTORETIC) 20-25 MG per tablet TAKE 1 TABLET EVERY [address], APRN - CNP   omeprazole (PRILOSEC) 40 MG delayed release capsule TAKE 1 CAPSULE EVERY DAY  PRAMOD Morillo CNP   estradiol (ESTRACE) 1 MG tablet Take 1 tablet by mouth daily  Patient taking differently: Take 0.5 mg by mouth daily   PRAMOD Morillo CNP   thyroid [facility] THYROID) 120 MG tablet TAKE 1 TABLET EVERY DAY except on Monday and Thursday, take only 1/2 tablet. Repeat thyroid levels in May. PRAMOD Morillo CNP   Magnesium 200 MG CHEW Take 1 each by mouth daily   Historical Provider, MD   calcipotriene (DOVONEX) 0.005 % cream Apply topically 2 times daily. Mitch Nava MD   Specialty Vitamins Products (MM BIOTIN/KERATIN PO) Take by mouth  Historical Provider, MD   Cholecalciferol (VITAMIN D3) 2000 UNITS TABS Take 2 tablets by mouth daily  Historical Provider, MD   Omega-3 Fatty Acids (FISH OIL) 1000 MG CAPS Take 3,000 mg by mouth daily  Historical Provider, MD       Social History     Tobacco Use    Smoking status: Former Smoker     Packs/day: 1.00     Years: 22.00     Pack years: 22.00     Quit date: 2021     Years since quittin.4    Smokeless tobacco: Never Used    Tobacco comment: 9-10 per day. Quit successfully and restarted during 2020   Vaping Use    Vaping Use: Never used   Substance Use Topics    Alcohol use: No     Alcohol/week: 0.0 standard drinks    Drug use: No   ASSESSMENT/PLAN:   Diagnosis Orders   1. Dehydration     2. Dizzy     3. Acute nonintractable headache, unspecified headache type     4. Viral URI     5. Essential hypertension     6.  Cervical herniated disc     reviewed recent ER note, labs and scans as well as recent cardio note  Hydrate well - goal 64oz non-caffeinated fluids/ day and eat plenty of fruit/veggies/ seeds/nuts/ beans encouraged  F/u cardio/ stress echo soon  Refill gabapentin 300 tid but uses more prn for periodic zingers/ nerve pain from neck into arms  Uri/ sinus sxs improved  bp stable on prinizide presently  F/u prn      UNABLE TO GET VIDEO CONNECTION - CHANGED TO TELEPHONIC VISIT    Karli Lerner, was evaluated through a synchronous (real-time) audio-video encounter. The patient (or guardian if applicable) is aware that this is a billable service. Verbal consent to proceed has been obtained within the past 12 months. The visit was conducted pursuant to the emergency declaration under the [address] [place] [address], [address] [place] [address] and the [employer] General Act. Patient identification was verified, and a caregiver was present when appropriate. The patient was located in a state where the provider was credentialed to provide care. Total time spent on this encounter: 21 or more minutes were spent on the digital evaluation and management of this patient. --Olivia Gillis MD on 12/14/2021 at 4:46 PM    An electronic signature was used to authenticate this note.

## 2021-12-15 ENCOUNTER — TELEPHONE (OUTPATIENT)
Dept: FAMILY MEDICINE CLINIC | Age: 71
End: 2021-12-15

## 2021-12-15 DIAGNOSIS — M50.20 CERVICAL HERNIATED DISC: ICD-10-CM

## 2021-12-15 RX ORDER — GABAPENTIN 300 MG/1
300 CAPSULE ORAL 3 TIMES DAILY
Qty: 90 CAPSULE | Refills: 2 | Status: SHIPPED | OUTPATIENT
Start: 2021-12-15 | End: 2022-02-25 | Stop reason: SDUPTHER

## 2021-12-15 NOTE — TELEPHONE ENCOUNTER
The Gabapentin - was sent to [facility] - she is now using [facility]  - please re send to         [address] [facility] [address] - [place] [phone]

## 2022-02-25 ENCOUNTER — OFFICE VISIT (OUTPATIENT)
Dept: FAMILY MEDICINE CLINIC | Age: 72
End: 2022-02-25
Payer: MEDICARE

## 2022-02-25 VITALS
BODY MASS INDEX: 39.38 KG/M2 | TEMPERATURE: 98.5 F | SYSTOLIC BLOOD PRESSURE: 130 MMHG | WEIGHT: 214 LBS | HEIGHT: 62 IN | DIASTOLIC BLOOD PRESSURE: 76 MMHG

## 2022-02-25 DIAGNOSIS — I10 ESSENTIAL HYPERTENSION: Primary | ICD-10-CM

## 2022-02-25 DIAGNOSIS — E03.9 ACQUIRED HYPOTHYROIDISM: ICD-10-CM

## 2022-02-25 DIAGNOSIS — M51.36 DDD (DEGENERATIVE DISC DISEASE), LUMBAR: ICD-10-CM

## 2022-02-25 DIAGNOSIS — E66.01 SEVERE OBESITY (BMI 35.0-39.9) WITH COMORBIDITY (HCC): ICD-10-CM

## 2022-02-25 DIAGNOSIS — M50.20 CERVICAL HERNIATED DISC: ICD-10-CM

## 2022-02-25 DIAGNOSIS — E78.00 HYPERCHOLESTEREMIA: ICD-10-CM

## 2022-02-25 DIAGNOSIS — R23.2 HOT FLASHES: ICD-10-CM

## 2022-02-25 DIAGNOSIS — E55.9 VITAMIN D DEFICIENCY: ICD-10-CM

## 2022-02-25 DIAGNOSIS — L98.9 SKIN LESION OF FACE: ICD-10-CM

## 2022-02-25 DIAGNOSIS — Z72.0 TOBACCO ABUSE: ICD-10-CM

## 2022-02-25 DIAGNOSIS — Z71.6 ENCOUNTER FOR TOBACCO USE CESSATION COUNSELING: ICD-10-CM

## 2022-02-25 DIAGNOSIS — M25.531 RIGHT WRIST PAIN: ICD-10-CM

## 2022-02-25 DIAGNOSIS — K30 DELAYED GASTRIC EMPTYING: ICD-10-CM

## 2022-02-25 PROCEDURE — 17000 DESTRUCT PREMALG LESION: CPT | Performed by: FAMILY MEDICINE

## 2022-02-25 PROCEDURE — 99214 OFFICE O/P EST MOD 30 MIN: CPT | Performed by: FAMILY MEDICINE

## 2022-02-25 PROCEDURE — G8400 PT W/DXA NO RESULTS DOC: HCPCS | Performed by: FAMILY MEDICINE

## 2022-02-25 PROCEDURE — G8427 DOCREV CUR MEDS BY ELIG CLIN: HCPCS | Performed by: FAMILY MEDICINE

## 2022-02-25 PROCEDURE — G8483 FLU IMM NO ADMIN DOC REA: HCPCS | Performed by: FAMILY MEDICINE

## 2022-02-25 PROCEDURE — 4040F PNEUMOC VAC/ADMIN/RCVD: CPT | Performed by: FAMILY MEDICINE

## 2022-02-25 PROCEDURE — 1123F ACP DISCUSS/DSCN MKR DOCD: CPT | Performed by: FAMILY MEDICINE

## 2022-02-25 PROCEDURE — 3017F COLORECTAL CA SCREEN DOC REV: CPT | Performed by: FAMILY MEDICINE

## 2022-02-25 PROCEDURE — 1090F PRES/ABSN URINE INCON ASSESS: CPT | Performed by: FAMILY MEDICINE

## 2022-02-25 PROCEDURE — G8417 CALC BMI ABV UP PARAM F/U: HCPCS | Performed by: FAMILY MEDICINE

## 2022-02-25 PROCEDURE — 1036F TOBACCO NON-USER: CPT | Performed by: FAMILY MEDICINE

## 2022-02-25 RX ORDER — OMEPRAZOLE 40 MG/1
CAPSULE, DELAYED RELEASE ORAL
Qty: 90 CAPSULE | Refills: 3 | Status: SHIPPED | OUTPATIENT
Start: 2022-02-25

## 2022-02-25 RX ORDER — ESTRADIOL 1 MG/1
0.5 TABLET ORAL DAILY
Qty: 45 TABLET | Refills: 3 | Status: SHIPPED | OUTPATIENT
Start: 2022-02-25 | End: 2022-11-02 | Stop reason: SDUPTHER

## 2022-02-25 RX ORDER — TIZANIDINE 4 MG/1
4 TABLET ORAL NIGHTLY PRN
Qty: 90 TABLET | Refills: 1 | Status: SHIPPED | OUTPATIENT
Start: 2022-02-25

## 2022-02-25 RX ORDER — LEVOTHYROXINE AND LIOTHYRONINE 76; 18 UG/1; UG/1
TABLET ORAL
Qty: 90 TABLET | Refills: 3 | Status: SHIPPED | OUTPATIENT
Start: 2022-02-25

## 2022-02-25 RX ORDER — LISINOPRIL AND HYDROCHLOROTHIAZIDE 25; 20 MG/1; MG/1
TABLET ORAL
Qty: 90 TABLET | Refills: 3 | Status: SHIPPED | OUTPATIENT
Start: 2022-02-25

## 2022-02-25 RX ORDER — MELOXICAM 15 MG/1
TABLET ORAL
Qty: 90 TABLET | Refills: 1 | Status: SHIPPED | OUTPATIENT
Start: 2022-02-25 | End: 2022-09-08

## 2022-02-25 RX ORDER — GABAPENTIN 300 MG/1
300 CAPSULE ORAL 3 TIMES DAILY
Qty: 90 CAPSULE | Refills: 2 | Status: SHIPPED | OUTPATIENT
Start: 2022-02-25 | End: 2022-03-27

## 2022-02-25 RX ORDER — BUPROPION HYDROCHLORIDE 150 MG/1
TABLET, EXTENDED RELEASE ORAL
Qty: 60 TABLET | Refills: 5 | Status: SHIPPED | OUTPATIENT
Start: 2022-02-25 | End: 2022-03-03 | Stop reason: SDUPTHER

## 2022-02-25 RX ORDER — METHOCARBAMOL 750 MG/1
750 TABLET, FILM COATED ORAL EVERY 8 HOURS PRN
COMMUNITY
Start: 2021-09-02 | End: 2022-02-25

## 2022-02-25 ASSESSMENT — PATIENT HEALTH QUESTIONNAIRE - PHQ9
SUM OF ALL RESPONSES TO PHQ QUESTIONS 1-9: 2
2. FEELING DOWN, DEPRESSED OR HOPELESS: 1
SUM OF ALL RESPONSES TO PHQ QUESTIONS 1-9: 2
SUM OF ALL RESPONSES TO PHQ QUESTIONS 1-9: 2
SUM OF ALL RESPONSES TO PHQ9 QUESTIONS 1 & 2: 2
1. LITTLE INTEREST OR PLEASURE IN DOING THINGS: 1
SUM OF ALL RESPONSES TO PHQ QUESTIONS 1-9: 2

## 2022-02-25 NOTE — PROGRESS NOTES
Former[facility] Medicine  Clinic Note    Date: 2/25/2022                                               Subjective:     Chief Complaint   Patient presents with    Pain     RIGHT WRIST PAIN NO INJURY GOES UP ARM     Hypertension     HTN ROUTINE FOLLOW UP     Hypothyroidism     HYPOTHROID FOLLOW UP      HPI  PAIN RADIAL WRIST/ DISTAL FOREARM DEVELOPED DOING HOUSEWORK 8 DAYS AGO - ? Tendonitis - wrapped wrist off and on for few days - doing better but aggravated making up bed yesterday  New skin lesion in front of left ear  Next appointment in April.   Hx of cervical surgery w/ cage after injury w/ surfboard as teen -done in last 1.5 years - had severe pain in neck after surgery - some weakness -no numbness/ tingling  No pain now  Quit smoking but  smokes and starts back up.  wellbutrin working well would like  Refill  Review meds - uses nsaid/ muscle relaxant/ gabapentin prn  bp okay at home  Good diet  Supplements but not mvi as nausea  Fairly good diet    BP Readings from Last 3 Encounters:   02/25/22 130/76   08/18/21 116/72   03/12/21 112/70     Pulse Readings from Last 3 Encounters:   08/18/21 66   03/12/21 97   03/03/21 70     Wt Readings from Last 3 Encounters:   02/25/22 214 lb (97.1 kg)   08/18/21 214 lb (97.1 kg)   03/12/21 216 lb (98 kg)     Past Surgical History:   Procedure Laterality Date    ADENOIDECTOMY      AS A CHILD    APPENDECTOMY      CERVICAL SPINE SURGERY N/A 7/8/2020    C6C7 INTERLAMINAR EPIDURAL STEROID INJECTION WITH FLUOROSCOPY performed by Jevon Wasserman MD at [place] [address]  2008    normal    COLONOSCOPY  9/23/2020    COLONOSCOPY POLYPECTOMY SNARE/COLD BIOPSY performed by Lam Quinn MD at [address] [facility] N/A 4/19/2019    L4/L5 INTERLAMINAR EPIDURAL STEROID INJECTION WITH FLUOROSCOPY performed by Jevon Wasserman MD at [place] [address] ovaries    KNEE SURGERY      Broken right patella    PAIN MANAGEMENT PROCEDURE N/A 1/22/2020    L4L5 INTERLAMINAR EPIDURAL STEROID INJECTION WITH FLUOROSCOPY [facility] N/A 12/23/2020    MIDLINE L4L5 INTERLAMINAR EPIDURAL STEROID INJECTION WITH FLUOROSCOPY performed by Robyn Alberts MD at [facility] [facility]      AS CHILD    UPPER GASTROINTESTINAL ENDOSCOPY N/A 9/23/2020    EGD DILATION BALLOON performed by Catherine Silvestre MD at [facility]              Patient Active Problem List    Diagnosis Date Noted    Prediabetes 03/05/2021    Plantar fasciitis of left foot 11/25/2020    Closed displaced fracture of distal phalanx of left great toe with routine healing 11/25/2020    Effusion of left knee 01/02/2018    Delayed gastric emptying 03/27/2014    BPPV (benign paroxysmal positional vertigo) 02/26/2014    Right knee pain 07/12/2013    Knee pain 10/11/2012    Hypertension 02/06/2012    Hypothyroid 04/20/2011    Edema 01/14/2011     Past Medical History:   Diagnosis Date    Delayed gastric emptying 03/27/2014    Hayfever     Hypertension     Hypothyroid 04/20/2011    Prediabetes 03/05/2021    A1C 5.7    Right knee pain 07/12/2013    Sleep apnea     CPAP     Past Surgical History:   Procedure Laterality Date    ADENOIDECTOMY      AS A CHILD    APPENDECTOMY      CERVICAL SPINE SURGERY N/A 7/8/2020    C6C7 INTERLAMINAR EPIDURAL STEROID INJECTION WITH FLUOROSCOPY performed by Robyn Alberts MD at [place] [address]  2008    normal    COLONOSCOPY  9/23/2020    COLONOSCOPY POLYPECTOMY SNARE/COLD BIOPSY performed by Catherine Silvestre MD at [facility] N/A 4/19/2019    L4/L5 INTERLAMINAR EPIDURAL STEROID INJECTION WITH FLUOROSCOPY performed by Robyn Alberts MD at [place] [address] ovaries    KNEE SURGERY      Broken right patella    PAIN MANAGEMENT PROCEDURE N/A 1/22/2020    L4L5 INTERLAMINAR EPIDURAL STEROID INJECTION WITH FLUOROSCOPY performed by Guanaco De La Rosa MD at [address] N/A 12/23/2020    MIDLINE L4L5 INTERLAMINAR EPIDURAL STEROID INJECTION WITH FLUOROSCOPY performed [facility] Guanaco De La Rosa [facility] END[facility] N/A 9/23/2020    EGD DILATION BALLOON performed by Nadira Szymanski MD at [facility]       Office Visit on 08/18/2021   Component Date Value Ref Range Status    TSH 08/18/2021 0.68  0.27 - 4.20 uIU/mL Final    Sodium 08/18/2021 142  136 - 145 mmol/L Final    Potassium 08/18/2021 4.1  3.5 - 5.1 mmol/L Final    Chloride 08/18/2021 104  99 - 110 mmol/L Final    CO2 08/18/2021 22  21 - 32 mmol/L Final    Anion Gap 08/18/2021 16  3 - 16 Final    Glucose 08/18/2021 99  70 - 99 mg/dL Final    BUN 08/18/2021 12  7 - 20 mg/dL Final    CREATININE 08/18/2021 0.6  0.6 - 1.2 mg/dL Final    GFR Non- 08/18/2021 >60  >60 Final    Comment: >60 mL/min/1.73m2 EGFR, calc. for ages 25 and older using the  MDRD formula (not corrected for weight), is valid for stable  renal function.  GFR  08/18/2021 >60  >60 Final    Comment: Chronic Kidney Disease: less than 60 ml/min/1.73 sq.m. Kidney Failure: less than 15 ml/min/1.73 sq.m. Results valid for patients 18 years and older.  Calcium 08/18/2021 9.7  8.3 - 10.6 mg/dL Final    Protime 08/18/2021 11.0  9.9 - 12.7 sec Final    Comment: Effective 6-30-21 09:00am EST  Please note reference ranges have  changed for PT and INR Testing.  INR 08/18/2021 0.97  0.88 - 1.12 Final    Comment: Effective 6/30/21 at 09:00am EST    Normal: 0.88 - 1.12  Therapeutic: 2.0 - 3.0  Pros.  Valve: 2.5 - 3.5  AMI: 2.0 - 3.0      aPTT 08/18/2021 34.9  26.2 - 38.6 sec Final    Comment: Therapeutic range: 60.0 - 90.0 sec    Effective 6-30-21 9:00am EST  Please note reference ranges have  changed for PTT Testing.  WBC 08/18/2021 7.9  4.0 - 11.0 K/uL Final    RBC 08/18/2021 4.66  4.00 - 5.20 M/uL Final    Hemoglobin 08/18/2021 13.4  12.0 - 16.0 g/dL Final    Hematocrit 08/18/2021 40.3  36.0 - 48.0 % Final    MCV 08/18/2021 86.4  80.0 - 100.0 fL Final    MCH 08/18/2021 28.9  26.0 - 34.0 pg Final    MCHC 08/18/2021 33.4  31.0 - 36.0 g/dL Final    RDW 08/18/2021 13.8  12.4 - 15.4 % Final    Platelets 89/23/9064 344  135 - 450 K/uL Final    MPV 08/18/2021 9.1  5.0 - 10.5 fL Final    Neutrophils % 08/18/2021 65.1  % Final    Lymphocytes % 08/18/2021 27.1  % Final    Monocytes % 08/18/2021 4.9  % Final    Eosinophils % 08/18/2021 2.0  % Final    Basophils % 08/18/2021 0.9  % Final    Neutrophils Absolute 08/18/2021 5.1  1.7 - 7.7 K/uL Final    Lymphocytes Absolute 08/18/2021 2.1  1.0 - 5.1 K/uL Final    Monocytes Absolute 08/18/2021 0.4  0.0 - 1.3 K/uL Final    Eosinophils Absolute 08/18/2021 0.2  0.0 - 0.6 K/uL Final    Basophils Absolute 08/18/2021 0.1  0.0 - 0.2 K/uL Final     Family History   Problem Relation Age of Onset    Dementia Mother     Mental Illness Mother     High Blood Pressure Mother     Heart Disease Father     High Blood Pressure Father     Stroke Maternal Grandmother     Heart Disease Other         dad's family     Current Outpatient Medications   Medication Sig Dispense Refill    methocarbamol (ROBAXIN) 750 MG tablet Take 750 mg by mouth every 8 hours as needed      gabapentin (NEURONTIN) 300 MG capsule Take 1 capsule by mouth 3 times daily for 30 days. Take 300 mg by mouth 3 times daily.  90 capsule 2    tiZANidine (ZANAFLEX) 4 MG tablet TAKE 1 TABLET BY MOUTH NIGHTLY AS NEEDED (SPASMS) 90 tablet 0    meloxicam (MOBIC) 15 MG tablet TAKE 1 TABLET BY MOUTH EVERY DAY 30 tablet 3    lisinopril-hydroCHLOROthiazide (PRINZIDE;ZESTORETIC) 20-25 MG per tablet TAKE 1 TABLET EVERY DAY 90 tablet 3    omeprazole (PRILOSEC) 40 MG delayed release capsule TAKE 1 CAPSULE EVERY DAY 90 capsule 3    estradiol (ESTRACE) 1 MG tablet Take 1 tablet by mouth daily (Patient taking differently: Take 0.5 mg by mouth daily ) 90 tablet 3    thyroid (ARMOUR THYROID) 120 MG tablet TAKE 1 TABLET EVERY DAY except on Monday and Thursday, take only 1/2 tablet. Repeat thyroid levels in May. 78 tablet 3    Magnesium 200 MG CHEW Take 1 each by mouth daily       calcipotriene (DOVONEX) 0.005 % cream Apply topically 2 times daily. 1 Tube 5    Specialty Vitamins Products (MM BIOTIN/KERATIN PO) Take by mouth      Cholecalciferol (VITAMIN D3) 2000 UNITS TABS Take 2 tablets by mouth daily      Omega-3 Fatty Acids (FISH OIL) 1000 MG CAPS Take 3,000 mg by mouth daily      metoprolol succinate (TOPROL XL) 50 MG extended release tablet Take 1 tablet by mouth daily (Patient not taking: Reported on 2/25/2022) 30 tablet 2     No current facility-administered medications for this visit. Allergies   Allergen Reactions    Codeine      delusions    Prednisone Nausea Only, Palpitations and Other (See Comments)     Dizziness, swollen eye,heart paplitations       Review of Systems    Objective:  /76 (Site: Left Upper Arm, Position: Sitting, Cuff Size: Large Adult)   Temp 98.5 °F (36.9 °C) (Infrared)   Ht 5' 2\" (1.575 m)   Wt 214 lb (97.1 kg)   LMP  (Exact Date)   Breastfeeding No   BMI 39.14 kg/m²     BP Readings from Last 3 Encounters:   02/25/22 130/76   08/18/21 116/72   03/12/21 112/70       Pulse Readings from Last 3 Encounters:   08/18/21 66   03/12/21 97   03/03/21 70       Wt Readings from Last 3 Encounters:   02/25/22 214 lb (97.1 kg)   08/18/21 214 lb (97.1 kg)   03/12/21 216 lb (98 kg)       Physical Exam  Constitutional:       General: She is not in acute distress. Appearance: She is well-developed. HENT:      Head: Normocephalic and atraumatic. Mouth/Throat:      Pharynx: No oropharyngeal exudate. Eyes:      General: No scleral icterus. Conjunctiva/sclera: Conjunctivae normal.   Neck:      Thyroid: No thyromegaly. Cardiovascular:      Rate and Rhythm: Normal rate and regular rhythm. Heart sounds: Normal heart sounds. No murmur heard. Pulmonary:      Effort: Pulmonary effort is normal. No respiratory distress. Breath sounds: Normal breath sounds. No wheezing or rales. Abdominal:      General: Bowel sounds are normal. There is no distension. Palpations: Abdomen is soft. Tenderness: There is no abdominal tenderness. Musculoskeletal:      Comments: No inflammation - tender over tendon to thumb   Lymphadenopathy:      Cervical: No cervical adenopathy. Skin:     General: Skin is warm and dry. Comments: 3-4mm keratotic papule face anterior to left ear - mildly inflamed   Neurological:      Mental Status: She is alert and oriented to person, place, and time. Assessment/Plan:     Essential hypertension  -     lisinopril-hydroCHLOROthiazide (PRINZIDE;ZESTORETIC) 20-25 MG per tablet; TAKE 1 TABLET EVERY DAY, Disp-90 tablet, R-3Normal  -     Comprehensive Metabolic Panel; Future  Cervical herniated disc  -     tiZANidine (ZANAFLEX) 4 MG tablet; Take 1 tablet by mouth nightly as needed (spasms), Disp-90 tablet, R-1Normal  -     meloxicam (MOBIC) 15 MG tablet; TAKE 1 TABLET BY MOUTH EVERY DAY as needed inflammation, Disp-90 tablet, R-1Normal  -     gabapentin (NEURONTIN) 300 MG capsule; Take 1 capsule by mouth 3 times daily for 30 days. Take 300 mg by mouth 3 times daily. , Disp-90 capsule, R-2Normal  DDD (degenerative disc disease), lumbar  -     tiZANidine (ZANAFLEX) 4 MG tablet;  Take 1 tablet by mouth nightly as needed (spasms), Disp-90 tablet, R-1Normal  -     meloxicam (MOBIC) 15 MG tablet; TAKE 1 TABLET BY MOUTH EVERY DAY as needed inflammation, Disp-90 tablet, R-1Normal  Acquired hypothyroidism  -     thyroid (ARMOUR THYROID) 120 MG tablet; TAKE 1 TABLET EVERY DAY except on Monday and Thursday, take only 1/2 tablet. Repeat thyroid levels in May., Disp-90 tablet, R-3Normal  -     T3, Free; Future  -     T4, Free; Future  -     TSH; Future  Right wrist pain  Skin lesion of face  Vitamin D deficiency  -     Vitamin D 25 Hydroxy; Future  Hypercholesteremia  -     Lipid Panel; Future  Hot flashes  -     estradiol (ESTRACE) 1 MG tablet; Take 0.5 tablets by mouth daily, Disp-45 tablet, R-3Normal  Delayed gastric emptying  -     omeprazole (PRILOSEC) 40 MG delayed release capsule; TAKE 1 CAPSULE EVERY DAY, Disp-90 capsule, R-3Normal  Severe obesity (BMI 35.0-39. 9) with comorbidity (HCC)  Tobacco abuse  -     buPROPion (WELLBUTRIN SR) 150 MG extended release tablet; 1 in AM x 3 days and then 1 tab twice daily with 2nd dose prior to 4 pm, Disp-60 tablet, R-5Normal  Encounter for tobacco use cessation counseling  -     buPROPion (WELLBUTRIN SR) 150 MG extended release tablet; 1 in AM x 3 days and then 1 tab twice daily with 2nd dose prior to 4 pm, Disp-60 tablet, R-5Normal  refills done   Stable on present meds  bp stable on prinizide  wellbutrin for smoking encouraged  ppi daily   Low dose estrogen - prefers to stay at present dose  Check labs  Thyroid seems stable  Cryo to keratotic lesion on left cheek - ? ak x3 - pt tolerated well  Repeat tx if not clearing vs. bx o/w  F/u derm in April  Tendonitis -voltaren gel or mobic w/ brace - thumb spica  F/u prn o/w 6 months    Aj Nick MD, MD  2/25/2022  1:49 PM

## 2022-03-03 ENCOUNTER — TELEPHONE (OUTPATIENT)
Dept: FAMILY MEDICINE CLINIC | Age: 72
End: 2022-03-03

## 2022-03-03 DIAGNOSIS — Z72.0 TOBACCO ABUSE: ICD-10-CM

## 2022-03-03 DIAGNOSIS — Z71.6 ENCOUNTER FOR TOBACCO USE CESSATION COUNSELING: ICD-10-CM

## 2022-03-03 RX ORDER — BUPROPION HYDROCHLORIDE 150 MG/1
TABLET, EXTENDED RELEASE ORAL
Qty: 180 TABLET | Refills: 0 | Status: SHIPPED | OUTPATIENT
Start: 2022-03-03 | End: 2022-08-12

## 2022-04-20 ENCOUNTER — TELEPHONE (OUTPATIENT)
Dept: FAMILY MEDICINE CLINIC | Age: 72
End: 2022-04-20

## 2022-04-20 RX ORDER — LEVOTHYROXINE SODIUM 0.15 MG/1
150 TABLET ORAL DAILY
Qty: 30 TABLET | Refills: 1 | Status: SHIPPED | OUTPATIENT
Start: 2022-04-20 | End: 2022-05-16

## 2022-04-20 NOTE — TELEPHONE ENCOUNTER
I sent levothyroxine 150mg to take daily in lieu of armour thyroid. Take daily on empty stomach. Please check fasting labs in next 4-6 weeks then follow up w/ me after labs. We can adjust dose based on your numbers at that point, if needed.   If any significant problems in meantime, let me know

## 2022-04-20 NOTE — TELEPHONE ENCOUNTER
PATIENT STOPPED IN THE OFFICE AND ASKED FOR A SCRIPT TO BE CALLED IN FOR [place] [facility]YRO[facility] - (SHE RECEIVED A LETTER STATING SHE WAS SENT A TEMP SUPPLY )  SHE IS ALSO LETTING YOU KNOW SHE IS WILLING TO TRY THE LEVOTHYROXIN TAB IF YOU WANT TO CHANGE IT    LETTER IN DR. Harmeet Forrest.

## 2022-05-16 RX ORDER — LEVOTHYROXINE SODIUM 0.15 MG/1
TABLET ORAL
Qty: 30 TABLET | Refills: 1 | Status: SHIPPED | OUTPATIENT
Start: 2022-05-16 | End: 2022-06-01 | Stop reason: SDUPTHER

## 2022-06-01 ENCOUNTER — TELEPHONE (OUTPATIENT)
Dept: FAMILY MEDICINE CLINIC | Age: 72
End: 2022-06-01

## 2022-06-01 RX ORDER — LEVOTHYROXINE SODIUM 0.15 MG/1
TABLET ORAL
Qty: 90 TABLET | Refills: 0 | Status: SHIPPED | OUTPATIENT
Start: 2022-06-01 | End: 2022-09-07

## 2022-08-12 DIAGNOSIS — Z71.6 ENCOUNTER FOR TOBACCO USE CESSATION COUNSELING: ICD-10-CM

## 2022-08-12 DIAGNOSIS — Z72.0 TOBACCO ABUSE: ICD-10-CM

## 2022-08-12 RX ORDER — BUPROPION HYDROCHLORIDE 150 MG/1
TABLET, EXTENDED RELEASE ORAL
Qty: 180 TABLET | Refills: 0 | Status: SHIPPED | OUTPATIENT
Start: 2022-08-12

## 2022-08-12 NOTE — TELEPHONE ENCOUNTER
Past Visits    Date Provider Specialty Visit Type Primary Dx   02/25/2022 Tabitha Renee MD Family Medicine Office Visit Essential hypertension     Next OV: None

## 2022-09-07 RX ORDER — LEVOTHYROXINE SODIUM 0.15 MG/1
TABLET ORAL
Qty: 90 TABLET | Refills: 0 | Status: SHIPPED | OUTPATIENT
Start: 2022-09-07 | End: 2022-11-02

## 2022-09-08 DIAGNOSIS — M51.36 DDD (DEGENERATIVE DISC DISEASE), LUMBAR: ICD-10-CM

## 2022-09-08 DIAGNOSIS — M50.20 CERVICAL HERNIATED DISC: ICD-10-CM

## 2022-09-08 RX ORDER — MELOXICAM 15 MG/1
TABLET ORAL
Qty: 90 TABLET | Refills: 1 | Status: SHIPPED | OUTPATIENT
Start: 2022-09-08

## 2022-10-19 ENCOUNTER — TELEPHONE (OUTPATIENT)
Dept: FAMILY MEDICINE CLINIC | Age: 72
End: 2022-10-19

## 2022-10-19 RX ORDER — ALBUTEROL SULFATE 90 UG/1
2 AEROSOL, METERED RESPIRATORY (INHALATION) 4 TIMES DAILY PRN
Qty: 54 G | Refills: 0 | Status: SHIPPED | OUTPATIENT
Start: 2022-10-19

## 2022-10-19 NOTE — TELEPHONE ENCOUNTER
I sent albuterol inhaler to use since having shortness of breath which will dilate bronchial tubes and hopefully help w/ breathing. Use mucinex dm for cough and can use zyrtec or xyzal for nasal drainage/ mucus. Hydrate well and check covid test asap- as treatment may be available if diagnosed in first 5 days of symptom onset.

## 2022-10-19 NOTE — TELEPHONE ENCOUNTER
PT C/O NASAL DRAINAGE: yes    WHAT COLOR: NO    COUGH: yes,     BRINGING UP PHLEGM:  yes   IF YES, WHAT COLOR: NO    SORE THROAT:  NO    POST NASAL DRIP:  yes,     HEADACHE:  yes,     EAR PAIN:  NO  LEFT, RIGHT OR BOTH:      S.O.B.:  YES WITH UP OR DOWN STAIRS  WITH OR WITHOUT EXERTION:      WHEEZING:  NO    HEAD CONGESTION:  NO    CHEST CONGESTION:  NO    BODY ACHES:  A LITTLE- HIPS ARE SORE FROM SITTING SO MUCH    VOMITING: NO    DIARRHEA:  NO    TEMP:  NO  IF SO, HIGHEST TEMP:    SYMPTOMS FOR HOW MANY DAYS:  4 DAYS    WHAT MEDS HAS PT TRIED:   MUS SINEX     MED ALLERGIES:  CODEINE, STEROIDS    VERIFY PHARMACY INFORMATION.         [id number] [place], [address] - F [phone]          APPT OFFERED / REFUSED:  SHE DOESN'T HAVE MY CHART - NOTHING LEFT FOR THE DAY      PT @  [phone]             IF REFUSED, WHY:

## 2022-10-20 ENCOUNTER — NURSE ONLY (OUTPATIENT)
Dept: FAMILY MEDICINE CLINIC | Age: 72
End: 2022-10-20

## 2022-11-02 DIAGNOSIS — R23.2 HOT FLASHES: ICD-10-CM

## 2022-11-02 RX ORDER — LEVOTHYROXINE SODIUM 0.15 MG/1
TABLET ORAL
Qty: 90 TABLET | Refills: 0 | Status: SHIPPED | OUTPATIENT
Start: 2022-11-02

## 2022-11-02 RX ORDER — ESTRADIOL 1 MG/1
0.5 TABLET ORAL DAILY
Qty: 45 TABLET | Refills: 0 | Status: SHIPPED | OUTPATIENT
Start: 2022-11-02

## 2022-11-02 NOTE — TELEPHONE ENCOUNTER
Patient needs a refill on her medication ESTRADIOL 1 MG TABLET - 1 TABLET PER DAY    [facility]- [address] - PHONE NO. [phone]    PLEASE GIVE HER A CALL BACK.

## 2022-12-06 ENCOUNTER — OFFICE VISIT (OUTPATIENT)
Dept: FAMILY MEDICINE CLINIC | Age: 72
End: 2022-12-06
Payer: MEDICARE

## 2022-12-06 VITALS
DIASTOLIC BLOOD PRESSURE: 68 MMHG | SYSTOLIC BLOOD PRESSURE: 124 MMHG | BODY MASS INDEX: 37.54 KG/M2 | HEART RATE: 76 BPM | OXYGEN SATURATION: 98 % | HEIGHT: 62 IN | WEIGHT: 204 LBS

## 2022-12-06 DIAGNOSIS — E66.09 CLASS 2 OBESITY DUE TO EXCESS CALORIES WITHOUT SERIOUS COMORBIDITY WITH BODY MASS INDEX (BMI) OF 37.0 TO 37.9 IN ADULT: ICD-10-CM

## 2022-12-06 DIAGNOSIS — E78.00 HYPERCHOLESTEREMIA: ICD-10-CM

## 2022-12-06 DIAGNOSIS — R23.2 HOT FLASHES: ICD-10-CM

## 2022-12-06 DIAGNOSIS — M54.12 CERVICAL RADICULOPATHY: ICD-10-CM

## 2022-12-06 DIAGNOSIS — M50.20 CERVICAL HERNIATED DISC: ICD-10-CM

## 2022-12-06 DIAGNOSIS — I10 ESSENTIAL HYPERTENSION: ICD-10-CM

## 2022-12-06 DIAGNOSIS — K30 DELAYED GASTRIC EMPTYING: ICD-10-CM

## 2022-12-06 DIAGNOSIS — Z23 PNEUMOCOCCAL VACCINE ADMINISTERED: ICD-10-CM

## 2022-12-06 DIAGNOSIS — Z71.6 ENCOUNTER FOR TOBACCO USE CESSATION COUNSELING: ICD-10-CM

## 2022-12-06 DIAGNOSIS — E03.9 ACQUIRED HYPOTHYROIDISM: ICD-10-CM

## 2022-12-06 DIAGNOSIS — Z87.891 PERSONAL HISTORY OF TOBACCO USE, PRESENTING HAZARDS TO HEALTH: ICD-10-CM

## 2022-12-06 DIAGNOSIS — M51.36 DDD (DEGENERATIVE DISC DISEASE), LUMBAR: ICD-10-CM

## 2022-12-06 DIAGNOSIS — Z71.89 ACP (ADVANCE CARE PLANNING): ICD-10-CM

## 2022-12-06 DIAGNOSIS — Z72.0 TOBACCO ABUSE: ICD-10-CM

## 2022-12-06 DIAGNOSIS — Z78.0 ASYMPTOMATIC POSTMENOPAUSAL STATE: ICD-10-CM

## 2022-12-06 DIAGNOSIS — R73.03 PREDIABETES: ICD-10-CM

## 2022-12-06 DIAGNOSIS — Z00.00 MEDICARE ANNUAL WELLNESS VISIT, SUBSEQUENT: Primary | ICD-10-CM

## 2022-12-06 DIAGNOSIS — E55.9 VITAMIN D DEFICIENCY: ICD-10-CM

## 2022-12-06 LAB
A/G RATIO: 1.3 (ref 1.1–2.2)
ALBUMIN SERPL-MCNC: 4.3 G/DL (ref 3.4–5)
ALP BLD-CCNC: 99 U/L (ref 40–129)
ALT SERPL-CCNC: 16 U/L (ref 10–40)
ANION GAP SERPL CALCULATED.3IONS-SCNC: 16 MMOL/L (ref 3–16)
AST SERPL-CCNC: 15 U/L (ref 15–37)
BILIRUB SERPL-MCNC: 0.3 MG/DL (ref 0–1)
BUN BLDV-MCNC: 11 MG/DL (ref 7–20)
CALCIUM SERPL-MCNC: 9.9 MG/DL (ref 8.3–10.6)
CHLORIDE BLD-SCNC: 102 MMOL/L (ref 99–110)
CHOLESTEROL, TOTAL: 215 MG/DL (ref 0–199)
CO2: 25 MMOL/L (ref 21–32)
CREAT SERPL-MCNC: <0.5 MG/DL (ref 0.6–1.2)
GFR SERPL CREATININE-BSD FRML MDRD: >60 ML/MIN/{1.73_M2}
GLUCOSE BLD-MCNC: 94 MG/DL (ref 70–99)
HDLC SERPL-MCNC: 51 MG/DL (ref 40–60)
LDL CHOLESTEROL CALCULATED: 135 MG/DL
POTASSIUM SERPL-SCNC: 4.5 MMOL/L (ref 3.5–5.1)
SODIUM BLD-SCNC: 143 MMOL/L (ref 136–145)
T4 FREE: 2.1 NG/DL (ref 0.9–1.8)
TOTAL PROTEIN: 7.5 G/DL (ref 6.4–8.2)
TRIGL SERPL-MCNC: 145 MG/DL (ref 0–150)
TSH REFLEX: 0.02 UIU/ML (ref 0.27–4.2)
VITAMIN D 25-HYDROXY: 37.7 NG/ML
VLDLC SERPL CALC-MCNC: 29 MG/DL

## 2022-12-06 PROCEDURE — 3078F DIAST BP <80 MM HG: CPT | Performed by: NURSE PRACTITIONER

## 2022-12-06 PROCEDURE — G0447 BEHAVIOR COUNSEL OBESITY 15M: HCPCS | Performed by: NURSE PRACTITIONER

## 2022-12-06 PROCEDURE — 36415 COLL VENOUS BLD VENIPUNCTURE: CPT | Performed by: NURSE PRACTITIONER

## 2022-12-06 PROCEDURE — G8483 FLU IMM NO ADMIN DOC REA: HCPCS | Performed by: NURSE PRACTITIONER

## 2022-12-06 PROCEDURE — G0439 PPPS, SUBSEQ VISIT: HCPCS | Performed by: NURSE PRACTITIONER

## 2022-12-06 PROCEDURE — 99406 BEHAV CHNG SMOKING 3-10 MIN: CPT | Performed by: NURSE PRACTITIONER

## 2022-12-06 PROCEDURE — 3017F COLORECTAL CA SCREEN DOC REV: CPT | Performed by: NURSE PRACTITIONER

## 2022-12-06 PROCEDURE — 99497 ADVNCD CARE PLAN 30 MIN: CPT | Performed by: NURSE PRACTITIONER

## 2022-12-06 PROCEDURE — 3074F SYST BP LT 130 MM HG: CPT | Performed by: NURSE PRACTITIONER

## 2022-12-06 PROCEDURE — 1123F ACP DISCUSS/DSCN MKR DOCD: CPT | Performed by: NURSE PRACTITIONER

## 2022-12-06 PROCEDURE — 90732 PPSV23 VACC 2 YRS+ SUBQ/IM: CPT | Performed by: NURSE PRACTITIONER

## 2022-12-06 PROCEDURE — G0009 ADMIN PNEUMOCOCCAL VACCINE: HCPCS | Performed by: NURSE PRACTITIONER

## 2022-12-06 RX ORDER — GABAPENTIN 300 MG/1
300 CAPSULE ORAL 3 TIMES DAILY
Qty: 90 CAPSULE | Refills: 5 | Status: SHIPPED | OUTPATIENT
Start: 2022-12-06 | End: 2023-01-05

## 2022-12-06 RX ORDER — OMEPRAZOLE 40 MG/1
CAPSULE, DELAYED RELEASE ORAL
Qty: 90 CAPSULE | Refills: 3 | Status: SHIPPED | OUTPATIENT
Start: 2022-12-06

## 2022-12-06 RX ORDER — LISINOPRIL AND HYDROCHLOROTHIAZIDE 25; 20 MG/1; MG/1
TABLET ORAL
Qty: 90 TABLET | Refills: 3 | Status: SHIPPED | OUTPATIENT
Start: 2022-12-06

## 2022-12-06 RX ORDER — TIZANIDINE 4 MG/1
4 TABLET ORAL NIGHTLY PRN
Qty: 90 TABLET | Refills: 3 | Status: SHIPPED | OUTPATIENT
Start: 2022-12-06

## 2022-12-06 RX ORDER — ESTRADIOL 1 MG/1
0.5 TABLET ORAL DAILY
Qty: 90 TABLET | Refills: 3 | Status: SHIPPED | OUTPATIENT
Start: 2022-12-06

## 2022-12-06 RX ORDER — BUPROPION HYDROCHLORIDE 150 MG/1
TABLET, EXTENDED RELEASE ORAL
Qty: 180 TABLET | Refills: 3 | Status: SHIPPED | OUTPATIENT
Start: 2022-12-06

## 2022-12-06 RX ORDER — MELOXICAM 15 MG/1
TABLET ORAL
Qty: 90 TABLET | Refills: 3 | Status: SHIPPED | OUTPATIENT
Start: 2022-12-06

## 2022-12-06 ASSESSMENT — ENCOUNTER SYMPTOMS
SHORTNESS OF BREATH: 0
ABDOMINAL DISTENTION: 0
DIARRHEA: 0
SINUS PAIN: 0
ABDOMINAL PAIN: 0
EYE DISCHARGE: 0
COLOR CHANGE: 0
CONSTIPATION: 0
NAUSEA: 0
CHEST TIGHTNESS: 0
SINUS PRESSURE: 0
COUGH: 0
BACK PAIN: 0

## 2022-12-06 ASSESSMENT — LIFESTYLE VARIABLES
HOW OFTEN DO YOU HAVE A DRINK CONTAINING ALCOHOL: NEVER
HOW MANY STANDARD DRINKS CONTAINING ALCOHOL DO YOU HAVE ON A TYPICAL DAY: PATIENT DOES NOT DRINK

## 2022-12-06 ASSESSMENT — PATIENT HEALTH QUESTIONNAIRE - PHQ9
SUM OF ALL RESPONSES TO PHQ QUESTIONS 1-9: 0
2. FEELING DOWN, DEPRESSED OR HOPELESS: 0
SUM OF ALL RESPONSES TO PHQ9 QUESTIONS 1 & 2: 0
SUM OF ALL RESPONSES TO PHQ QUESTIONS 1-9: 0
1. LITTLE INTEREST OR PLEASURE IN DOING THINGS: 0
SUM OF ALL RESPONSES TO PHQ QUESTIONS 1-9: 0
SUM OF ALL RESPONSES TO PHQ QUESTIONS 1-9: 0

## 2022-12-06 NOTE — PROGRESS NOTES
Immunizations Administered       Name Date Dose Route    Pneumococcal Polysaccharide ([id number]) 12/6/2022 0.5 mL Intramuscular    Site: Deltoid- Left    Lot: [id number]    NDC: 4968-1994-83

## 2022-12-06 NOTE — PATIENT INSTRUCTIONS
Please call [address] [place] [address] to schedule your mammogram or [phone] for mammogram Wayne Vega           Advance Directives: Care Instructions  Overview  An advance directive is a legal way to state your wishes at the end of your life. It tells your family and your doctor what to do if you can't say what you want. There are two main types of advance directives. You can change them any time your wishes change. Living will. This form tells your family and your doctor your wishes about life support and other treatment. The form is also called a declaration. Medical power of . This form lets you name a person to make treatment decisions for you when you can't speak for yourself. This person is called a health care agent (health care proxy, health care surrogate). The form is also called a durable power of  for health care. If you do not have an advance directive, decisions about your medical care may be made by a family member, or by a doctor or a  who doesn't know you. It may help to think of an advance directive as a gift to the people who care for you. If you have one, they won't have to make tough decisions by themselves. Follow-up care is a key part of your treatment and safety. Be sure to make and go to all appointments, and call your doctor if you are having problems. It's also a good idea to know your test results and keep a list of the medicines you take. What should you include in an advance directive? Many states have a unique advance directive form. (It may ask you to address specific issues.) Or you might use a universal form that's approved by many states. If your form doesn't tell you what to address, it may be hard to know what to include in your advance directive. Use the questions below to help you get started. Who do you want to make decisions about your medical care if you are not able to?   What life-support measures do you want if you have a serious illness that gets worse over time or can't be cured? What are you most afraid of that might happen? (Maybe you're afraid of having pain, losing your independence, or being kept alive by machines.)  Where would you prefer to die? (Your home? A hospital? A nursing home?)  Do you want to donate your organs when you die? Do you want certain Confucianism practices performed before you die? When should you call for help? Be sure to contact your doctor if you have any questions. Where can you learn more? Go to https://chpepiceweb.[facility]. org and sign in to your Neurolink account. Enter R264 in the [employer] box to learn more about \"Advance Directives: Care Instructions. \"     If you do not have an account, please click on the \"Sign Up Now\" link. Current as of: June 16, 2022               Content Version: 13.4  © 2006-2022 [employer]. Care instructions adapted under license by [facility] ([facility]). If you have questions about a medical condition or this instruction, always ask your healthcare professional. [address] any warranty or liability for your use of this information. Learning About Medical Power of   What is a medical power of ? A medical power of , also called a durable power of  for health care, is one type of the legal forms called advance directives. It lets you name the person you want to make treatment decisions for you if you can't speak or decide for yourself. The person you choose is called your health care agent. This person is also called a health care proxy or health care surrogate. A medical power of  may be called something else in your state. How do you choose a health care agent? Choose your health care agent carefully. This person may or may not be a family member. Talk to the person before you make your final decision. Make sure he or she is comfortable with this responsibility.   It's a good idea to choose someone who:  Is at least 25years old. Knows you well and understands what makes life meaningful for you. Understands your Sikh and moral values. Will do what you want, not what he or she wants. Will be able to make difficult choices at a stressful time. Will be able to refuse or stop treatment, if that is what you would want, even if you could die. Will be firm and confident with health professionals if needed. Will ask questions to get needed information. Lives near you or agrees to travel to you if needed. Your family may help you make medical decisions while you can still be part of that process. But it's important to choose one person to be your health care agent in case you aren't able to make decisions for yourself. If you don't fill out the legal form and name a health care agent, the decisions your family can make may be limited. A health care agent may be called something else in your state. Who will make decisions for you if you don't have a health care agent? If you don't have a health care agent or a living will, you may not get the care you want. Decisions may be made by family members who disagree about your medical care. Or decisions may be made by a medical professional who doesn't know you well. In some cases, a  makes the decisions. When you name a health care agent, it is very clear who has the power to make health decisions for you. How do you name a health care agent? You name your health care agent on a legal form. This form is usually called a medical power of . Ask your hospital, state bar association, or office on aging where to find these forms. You must sign the form to make it legal. Some states require you to get the form notarized. This means that a person called a  watches you sign the form and then he or she signs the form. Some states also require that two or more witnesses sign the form.   Be sure to tell your family members and doctors who your health care agent is. Where can you learn more? Go to https://chpepiceweb.[employer]. org and sign in to your Lexos Media account. Enter [id number] in the [facility] box to learn more about \"Learning About [address]. \"     If you do not have an account, please click on the \"Sign Up Now\" link. Current as of: October 6, 2021               Content Version: 13.4  © 2006-2022 Healthwise, [address]. Care instructions adapted under license by [facility] ([facility]). If you have questions about a medical condition or this instruction, always ask your healthcare professional. [address] any warranty or liability for your use of this information. Learning About Living Jojo Kateix  What is a living will? A living will, also called a declaration, is a legal form. It tells your family and your doctor your wishes when you can't speak for yourself. It's used by the health professionals who will treat you as you near the end of your life or if you get seriously hurt or ill. If you put your wishes in writing, your loved ones and others will know what kind of care you want. They won't need to guess. This can ease your mind and be helpful to others. And you can change or cancel your living will at any time. A living will is not the same as an estate or property will. An estate will explains what you want to happen with your money and property after you die. How do you use it? Keep these facts in mind about how a living will is used. Your living will is used only if you can't speak or make decisions for yourself. Most often, one or more doctors must certify that you can't speak or decide for yourself before your living will takes effect. If you get better and can speak for yourself again, you can accept or refuse any treatment. It doesn't matter what you said in your living will. Some states may limit your right to refuse treatment in certain cases.  For example, you may need to clearly state in your living will that you don't want artificial hydration and nutrition, such as being fed through a tube. Is a living will a legal document? A living will is a legal document. Each state has its own laws about living bhatti. And a living will may be called something else in your state. Here are some things to know about living bhatti. You don't need an  to complete a living will. But legal advice can be helpful if your state's laws are unclear. It can also help if your health history is complicated or your family can't agree on what should be in your living will. You can change your living will at any time. Some people find that their wishes about end-of-life care change as their health changes. If you make big changes to your living will, complete a new form. If you move to another state, make sure that your living will is legal in the state where you now live. In most cases, doctors will respect your wishes even if you have a form from a different state. You might use a universal form that has been approved by many states. This kind of form can sometimes be filled out and stored online. Your digital copy will then be available wherever you have a connection to the internet. The doctors and nurses who need to treat you can find it right away. Your state may offer an online registry. This is another place where you can store your living will online. It's a good idea to get your living will notarized. This means using a person called a  to watch two people sign, or witness, your living will. What should you know when you create a living will? Here are some questions to ask yourself as you make your living will. Do you know enough about life support methods that might be used? If not, talk to your doctor so you know what might be done if you can't breathe on your own, your heart stops, or you can't swallow.   What things would you still want to be able to do after you receive life-support methods? Would you want to be able to walk? To speak? To eat on your own? To live without the help of machines? Do you want certain Yazdanism practices performed if you become very ill? If you have a choice, where do you want to be cared for? In your home? At a hospital or nursing home? If you have a choice at the end of your life, where would you prefer to die? At home? In a hospital or nursing home? Somewhere else? Would you prefer to be buried or cremated? Do you want your organs to be donated after you die? What should you do with your living will? Make sure that your family members and your health care agent have copies of your living will (also called a declaration). Give your doctor a copy of your living will. Ask to have it kept as part of your medical record. If you have more than one doctor, make sure that each one has a copy. Put a copy of your living will where it can be easily found. For example, some people may put a copy on their refrigerator door. If you are using a digital copy, be sure your doctor, family members, and health care agent know how to find and access it. Where can you learn more? Go to https://[employer] Gu[employer]las Wearableseb.Yoostay. org and sign in to your InMyRoom account. Enter M665 in the [facility] box to learn more about \"Learning About Living Raimundo Lassiter. \"     If you do not have an account, please click on the \"Sign Up Now\" link. Current as of: June 16, 2022               Content Version: 13.4  © 2006-2022 Healthwise, Incorporated. Care instructions adapted under license by [facility] ([facility]). If you have questions about a medical condition or this instruction, always ask your healthcare professional. [place] [address] any warranty or liability for your use of this information. Learning About Healthy Weight  What is a healthy weight?      A healthy weight is the weight at which you feel good about yourself and have energy for work and play. It's also one that lowers your risk for health problems. What can you do to stay at a healthy weight? It can be hard to stay at a healthy weight, especially when fast food, vending-machine snacks, and processed foods are so easy to find. And with your busy lifestyle, activity may be low on your list of things to do. But staying at a healthy weight may be easier than you think. Here are some dos and don'ts for staying at a healthy weight. Do eat healthy foods  The kinds of foods you eat have a big impact on both your weight and your health. Reaching and staying at a healthy weight is not about going on a diet. It's about making healthier food choices every day and changing your diet for good. Healthy eating means eating a variety of foods so that you get all the nutrients you need. Your body needs protein, carbohydrate, and fats for energy. They keep your heart beating, your brain active, and your muscles working. On most days, try to eat from each food group. This means eating a variety of: Whole grains, such as whole wheat breads and pastas. Fruits and vegetables. Dairy products, such as low-fat milk, yogurt, and cheese. Lean proteins, such as all types of fish, chicken without the skin, and beans. Don't have too much or too little of one thing. All foods, if eaten in moderation, can be part of healthy eating. Even sweets can be okay. If your favorite foods are high in fat, salt, sugar, or calories, limit how often you eat them. Eat smaller servings, or look for healthy substitutes. Do watch what you eat  Many people eat more than their bodies need. Part of staying at a healthy weight means learning how much food you really need from day to day and not eating more than that. Even with healthy foods, eating too much can make you gain weight.   Having a well-balanced diet means that you eat enough, but not too much, and that your food gives you the nutrients you need to stay healthy. So listen to your body. Eat when you're hungry. Stop when you feel satisfied. It's a good idea to have healthy snacks ready for when you get hungry. Keep healthy snacks with you at work, in your car, and at home. If you have a healthy snack easily available, you'll be less likely to pick a candy bar or bag of chips from a vending machine instead. Some healthy snacks you might want to keep on hand are fruit, low-fat yogurt, string cheese, low-fat microwave popcorn, raisins and other dried fruit, nuts, whole wheat crackers, pretzels, carrots, celery sticks, and broccoli. Do some physical activity  A big part of reaching and staying at a healthy weight is being active. When you're active, you burn calories. This makes it easier to reach and stay at a healthy weight. When you're active on a regular basis, your body burns more calories, even when you're at rest. Being active helps you lose fat and build lean muscle. Try to be active for at least 1 hour every day. This may sound like a lot, but it's okay to be active in smaller blocks of time that add up to 1 hour a day. Any activity that makes your heart beat faster and keeps it there for a while counts. A brisk walk, run, or swim will get your heart beating faster. So will climbing stairs, shooting baskets, or cycling. Even some household chores like vacuuming and mowing the lawn will get your heart rate up. Pick activities that you enjoy--ones that make your heart beat faster, your muscles stronger, and your muscles and joints more flexible. If you find more than one thing you like doing, do them all. You don't have to do the same thing every day. Don't diet  Diets don't work. Diets are temporary. Because you give up so much when you diet, you may be hungry and think about food all the time. And after you stop dieting, you also may overeat to make up for what you missed. Most people who diet end up gaining back the pounds they lost--and more.   Remember that healthy bodies come in lots of shapes and sizes. Everyone can get healthier by eating better and being more active. Where can you learn more? Go to https://chpepiceweb.[employer]. org and sign in to your MyMosa account. Enter [id number] [id number] in the [employer] box to learn more about \"Learning About Healthy Weight. \"     If you do not have an account, please click on the \"Sign Up Now\" link. Current as of: December 27, 2021               Content Version: 13.4  © 9185-6243 [employer]. Care instructions adapted under license by [facility] ([facility]). If you have questions about a medical condition or this instruction, always ask your healthcare professional. [address] any warranty or liability for your use of this information. Eating Healthy Foods: Care Instructions  Your Care Instructions     Eating healthy foods can help lower your risk for disease. Healthy food gives you energy and keeps your heart strong, your brain active, your muscles working, and your bones strong. A healthy diet includes a variety of foods from the basic food groups: grains, vegetables, fruits, milk and milk products, and meat and beans. Some people may eat more of their favorite foods from only one food group and, as a result, miss getting the nutrients they need. So, it is important to pay attention not only to what you eat but also to what you are missing from your diet. You can eat a healthy, balanced diet by making a few small changes. Follow-up care is a key part of your treatment and safety. Be sure to make and go to all appointments, and call your doctor if you are having problems. It's also a good idea to know your test results and keep a list of the medicines you take. How can you care for yourself at home? Look at what you eat  Keep a food diary for a week or two and record everything you eat or drink. Track the number of servings you eat from each food group.   For a balanced diet every day, eat a variety of:  6 or more ounce-equivalents of grains, such as cereals, breads, crackers, rice, or pasta, every day. An ounce-equivalent is 1 slice of bread, 1 cup of ready-to-eat cereal, or ½ cup of cooked rice, cooked pasta, or cooked cereal.  2½ cups of vegetables, especially:  Dark-green vegetables such as broccoli and spinach. Orange vegetables such as carrots and sweet potatoes. Dry beans (such as harrell and kidney beans) and peas (such as lentils). 2 cups of fresh, frozen, or canned fruit. A small apple or 1 banana or orange equals 1 cup.  3 cups of nonfat or low-fat milk, yogurt, or other milk products. 5½ ounces of meat and beans, such as chicken, fish, lean meat, beans, nuts, and seeds. One egg, 1 tablespoon of peanut butter, ½ ounce nuts or seeds, or ¼ cup of cooked beans equals 1 ounce of meat. Learn how to read food labels for serving sizes and ingredients. Fast-food and convenience-food meals often contain few or no fruits or vegetables. Make sure you eat some fruits and vegetables to make the meal more nutritious. Look at your food diary. For each food group, add up what you have eaten and then divide the total by the number of days. This will give you an idea of how much you are eating from each food group. See if you can find some ways to change your diet to make it more healthy. Start small  Do not try to make dramatic changes to your diet all at once. You might feel that you are missing out on your favorite foods and then be more likely to fail. Start slowly, and gradually change your habits. Try some of the following:  Use whole wheat bread instead of white bread. Use nonfat or low-fat milk instead of whole milk. Eat brown rice instead of white rice, and eat whole wheat pasta instead of white-flour pasta. Try low-fat cheeses and low-fat yogurt. Add more fruits and vegetables to meals and have them for snacks.   Add lettuce, tomato, cucumber, and onion to sandwiches. Add fruit to yogurt and cereal.  Enjoy food  You can still eat your favorite foods. You just may need to eat less of them. If your favorite foods are high in fat, salt, and sugar, limit how often you eat them, but do not cut them out entirely. Eat a wide variety of foods. Make healthy choices when eating out  The type of restaurant you choose can help you make healthy choices. Even fast-food chains are now offering more low-fat or healthier choices on the menu. Choose smaller portions, or take half of your meal home. When eating out, try:  A veggie pizza with a whole wheat crust or grilled chicken (instead of sausage or pepperoni). Pasta with roasted vegetables, grilled chicken, or marinara sauce instead of cream sauce. A vegetable wrap or grilled chicken wrap. Broiled or poached food instead of fried or breaded items. Make healthy choices easy  Buy packaged, prewashed, ready-to-eat fresh vegetables and fruits, such as baby carrots, salad mixes, and chopped or shredded broccoli and cauliflower. Buy packaged, presliced fruits, such as melon or pineapple. Choose 100% fruit or vegetable juice instead of soda. Limit juice intake to 4 to 6 oz (½ to ¾ cup) a day. Blend low-fat yogurt, fruit juice, and canned or frozen fruit to make a smoothie for breakfast or a snack. Where can you learn more? Go to https://[employer].kylie.healthWSP Global. org and sign in to your Jumpzter account. Enter V376 in the [facility] box to learn more about \"Eating Healthy Foods: Care Instructions. \"     If you do not have an account, please click on the \"Sign Up Now\" link. Current as of: May 9, 2022               Content Version: 13.4  © 1418-0725 Healthwise, Incorporated. Care instructions adapted under license by [facility] ([facility]).  If you have questions about a medical condition or this instruction, always ask your healthcare professional. [place] [address] any warranty or liability for your use of this information. Quitting Tobacco: Care Instructions  Quitting tobacco is much harder than simply changing a habit. Nicotine cravings make it hard to quit, but you can do it. Your doctor will help you set up the plan that best meets your needs. You will miss the nicotine at first. You may feel short-tempered and grumpy. You may have trouble sleeping or thinking clearly. The urge to use tobacco may continue for a time. Combining tools can raise your chances of success. You can use medicine along with counseling. And you can join a quit-tobacco program, such as the American Lung Association's Freedom from Smoking program.    Get support. Reach out to family and friends, and find a counselor to help you quit. Join a support group, such as Nicotine Anonymous. Go to www.smokefree. gov to sign up for text messaging support. Talk to your doctor or pharmacist about medicines that can help you quit. Medicines can help with cravings and withdrawal symptoms. There are several over-the-counter choices, such as nicotine patches, gum, and lozenges. After you quit, do not use tobacco again, not even once. Get rid of all tobacco products and anything that reminds you of tobacco, such as ashtrays. Avoid things that make you reach for tobacco.  Change your daily routine. Take a different route to work, or eat a meal in a different place. Try to cut down on stress. Find ways to calm yourself, such as taking a hot bath or doing deep breathing exercises. Eat a healthy diet, and get regular exercise. Having healthy habits may help you quit using tobacco.     Don't give up on quitting if you use tobacco again. Most people quit and restart a few times before they quit for good. Follow-up care is a key part of your treatment and safety. Be sure to make and go to all appointments, and call your doctor if you are having problems.  It's also a good idea to know your test results and keep a list of the medicines you take. Where can you learn more? Go to https://chpepiceweb.healthHealthcare IT. org and sign in to your Kareo account. Enter R882 in the [facility] box to learn more about \"Quitting Tobacco: Care Instructions. \"     If you do not have an account, please click on the \"Sign Up Now\" link. Current as of: November 8, 2021               Content Version: 13.4  © 2006-2022 [employer]. Care instructions adapted under license by [facility] ([facility]). If you have questions about a medical condition or this instruction, always ask your healthcare professional. [address] any warranty or liability for your use of this information. Learning About Benefits From Quitting Smoking  Why is it important to quit smoking? If you're thinking about quitting smoking, you may have a few reasons to be smoke-free. Your health may be one of them. When you quit smoking, you lower your risk for many serious health problems, such as cancer, lung disease, heart attack, stroke, blood vessel disease, and blindness from macular degeneration. When you're smoke-free, you get sick less often, and you heal faster. You are less likely to get colds, flu, bronchitis, and pneumonia. As a nonsmoker, you may find that your mood is better and you are less stressed. When and how will you feel healthier? Quitting has real health benefits that start from day 1 of being smoke-free. And the longer you stay smoke-free, the healthier you get and the better you feel. The first hours  After just 20 minutes, your blood pressure and heart rate go down. That means there's less stress on your heart and blood vessels. Within 12 hours, the level of carbon monoxide in your blood drops back to normal. That makes room for more oxygen. With more oxygen in your body, you may notice that you have more energy than when you smoked. After 2 weeks  Your lungs start to work better.   Your risk of heart attack starts to drop. After 1 month  When your lungs are clear, you cough less and breathe deeper, so it's easier to be active. Your sense of taste and smell return. That means you can enjoy food more than you have since you started smoking. Over the years  Over the years, your risks of heart disease, heart attack, and stroke are lower. After 10 years, your risk of dying from lung cancer is cut by about half. And your risk for many other types of cancer is lower too. How would quitting help others in your life? When you quit smoking, you improve the health of everyone who now breathes in your smoke. Their heart, lung, and cancer risks drop, much like yours. They are sick less. For babies and small children, living smoke-free means they're less likely to have ear infections, pneumonia, and bronchitis. If you're a woman who is or will be pregnant someday, quitting smoking means a healthier . Children who are close to you are less likely to become adult smokers. Where can you learn more? Go to https://[employer]. org and sign in to your [employer] account. Enter [id number] in the [facility] box to learn more about \"Learning About Benefits From Quitting Smoking. \"     If you do not have an account, please click on the \"Sign Up Now\" link. Current as of: 2021               Content Version: 13.4   Healthwise, Incorporated. Care instructions adapted under license by [address] [place] [address]. If you have questions about a medical condition or this instruction, always ask your healthcare professional. [place] [address] any warranty or liability for your use of this information. Deciding About Using Medicines To Quit Smoking  How can you decide about using medicines to quit smoking? What are the medicines you can use? Your doctor may prescribe varenicline (Chantix) or bupropion (Zyban).  These medicines can help you cope with cravings for tobacco. They are pills that don't contain nicotine. You also can use nicotine replacement products. These do contain nicotine. There are many types. Gum and lozenges slowly release nicotine into your mouth. Patches stick to your skin. They slowly release nicotine into your bloodstream.  An inhaler has a cervantes that contains nicotine. You breathe in a puff of nicotine vapor through your mouth and throat. Nasal spray releases a mist that contains nicotine. What are key points about this decision? Using medicines can double your chances of quitting smoking. They can ease cravings and withdrawal symptoms. Getting counseling along with using medicine can raise your chances of quitting even more. If you smoke fewer than 5 cigarettes a day, you may not need medicines to help you quit smoking. These medicines have less nicotine than cigarettes. And by itself, nicotine is not nearly as harmful as smoking. The tars, carbon monoxide, and other toxic chemicals in tobacco cause the harmful effects. The side effects of nicotine replacement products depend on the type of product. For example, a patch can make your skin red and itchy. Medicines in pill form can make you sick to your stomach. They can also cause dry mouth and trouble sleeping. For most people, the side effects are not bad enough to make them stop using the products. Why might you choose to use medicines to quit smoking? You have tried on your own to stop smoking, but you were not able to stop. You smoke more than 5 cigarettes a day. You want to increase your chances of quitting smoking. You want to reduce your cravings and withdrawal symptoms. You feel the benefits of medicine outweigh the side effects. Why might you choose not to use medicine? You want to try quitting on your own by stopping all at once (\"cold turkey\"). You want to cut back slowly on the number of cigarettes you smoke. You smoke fewer than 5 cigarettes a day.   You do not like using medicine. You feel the side effects of medicines outweigh the benefits. You are worried about the cost of medicines. Your decision  Thinking about the facts and your feelings can help you make a decision that is right for you. Be sure you understand the benefits and risks of your options, and think about what else you need to do before you make the decision. Where can you learn more? Go to https://chpepiceweb.[employer]. org and sign in to your [employer] account. Enter Z559 in the [facility] box to learn more about \"Deciding About Using Medicines To Quit Smoking. \"     If you do not have an account, please click on the \"Sign Up Now\" link. Current as of: November 8, 2021               Content Version: 13.4  © 2414-5630 Healthwise, Incorporated. Care instructions adapted under license by [facility] ([facility]). If you have questions about a medical condition or this instruction, always ask your healthcare professional. [place] [address] any warranty or liability for your use of this information. Personalized Preventive Plan for [place] De Anda - 12/6/2022  Medicare offers a range of preventive health benefits. Some of the tests and screenings are paid in full while other may be subject to a deductible, co-insurance, and/or copay. Some of these benefits include a comprehensive review of your medical history including lifestyle, illnesses that may run in your family, and various assessments and screenings as appropriate. After reviewing your medical record and screening and assessments performed today your provider may have ordered immunizations, labs, imaging, and/or referrals for you. A list of these orders (if applicable) as well as your Preventive Care list are included within your After Visit Summary for your review.     Other Preventive Recommendations:    A preventive eye exam performed by an eye specialist is recommended every 1-2 years to screen for glaucoma; cataracts, macular degeneration, and other eye disorders. A preventive dental visit is recommended every 6 months. Try to get at least 150 minutes of exercise per week or 10,000 steps per day on a pedometer . Order or download the FREE \"Exercise & Physical Activity: Your Everyday Guide\" from [employer]. Call 4-832.876.1457 or search [employer] online. You need 8563-7796 mg of calcium and [phone] IU of vitamin D per day. It is possible to meet your calcium requirement with diet alone, but a vitamin D supplement is usually necessary to meet this goal.  When exposed to the sun, use a sunscreen that protects against both UVA and UVB radiation with an SPF of 30 or greater. Reapply every 2 to 3 hours or after sweating, drying off with a towel, or swimming. Always wear a seat belt when traveling in a car. Always wear a helmet when riding a bicycle or motorcycle.

## 2022-12-06 NOTE — PROGRESS NOTES
Date of Service:  2022    Rosalind Robertson (:  1950) is a 71 y.o. female, here for evaluation of the following medical concerns:    Chief Complaint   Patient presents with    Hypertension     HTN FOLLOW UP     Thyroid Problem     ROUTINE FOLLOW UP     Medicare AWV        HPI    Hypertension:  Home blood pressure monitoring: No.  She is not adherent to a low sodium diet. Patient denies chest pain, shortness of breath, headache, lightheadedness, blurred vision, peripheral edema, palpitations, dry cough, and fatigue. Antihypertensive medication side effects: no medication side effects noted. Use of agents associated with hypertension: none. Hypothyroidism: Recent symptoms: none. She denies fatigue, weight gain, weight loss, cold intolerance, heat intolerance, hair loss, dry skin, constipation, diarrhea, edema, anxiety, tremor, palpitations, and dysphagia. Patient is  taking her medication consistently on an empty stomach. Lab Results   Component Value Date/Time    TSHREFLEX 0.68 2021 11:27 AM    TSHREFLEX 0.08 2021 12:11 PM    TSHREFLEX 0.02 2018 03:27 PM     Lab Results   Component Value Date    TSH <0.01 (L) 2019    TSH 0.02 (L) 2017    TSH 0.72 2016                                           Sodium (mmol/L)   Date Value   2021 142    BUN (mg/dL)   Date Value   2021 12    Glucose (mg/dL)   Date Value   2021 99   2011 106      Potassium (mmol/L)   Date Value   2021 4.1    Creatinine (mg/dL)   Date Value   2021 0.6           Review of Systems   Constitutional:  Negative for activity change, appetite change, fatigue, fever and unexpected weight change. HENT:  Negative for congestion, ear pain, sinus pressure and sinus pain. Eyes:  Negative for discharge and visual disturbance. Respiratory:  Negative for cough, chest tightness and shortness of breath.     Cardiovascular:  Negative for chest pain, palpitations and leg swelling. Gastrointestinal:  Negative for abdominal distention, abdominal pain, constipation, diarrhea and nausea. Endocrine: Negative for cold intolerance, heat intolerance, polydipsia, polyphagia and polyuria. Genitourinary:  Negative for decreased urine volume, difficulty urinating, dysuria, flank pain, frequency and urgency. Musculoskeletal:  Negative for arthralgias, back pain, gait problem, joint swelling, myalgias and neck pain. Skin:  Negative for color change, rash and wound. Allergic/Immunologic: Negative for food allergies and immunocompromised state. Neurological:  Negative for dizziness, tremors, speech difficulty, weakness, light-headedness, numbness and headaches. Hematological:  Negative for adenopathy. Does not bruise/bleed easily. Psychiatric/Behavioral:  Negative for confusion, decreased concentration, self-injury, sleep disturbance and suicidal ideas. The patient is not nervous/anxious. Prior to Visit Medications    Medication Sig Taking? Authorizing Provider   buPROPion (WELLBUTRIN SR) 150 MG extended release tablet TAKE 1 TABLET BY MOUTH IN THE MORNING FOR 3 DAYS AND THEN TAKE 1 TABLET BY MOUTH TWICE DAILY WITH 2ND DOSE PRIOR TO 4 PM Yes PRAMOD Ni CNP   tiZANidine (ZANAFLEX) 4 MG tablet Take 1 tablet by mouth nightly as needed (spasms) Yes PRAMOD Ni CNP   omeprazole (PRILOSEC) 40 MG delayed release capsule TAKE 1 CAPSULE EVERY DAY Yes PRAMOD Ni CNP   meloxicam (MOBIC) 15 MG tablet TAKE 1 TABLET BY MOUTH EVERY DAY AS NEEDED INFLAMMATION Yes PRAMOD Ni CNP   lisinopril-hydroCHLOROthiazide (PRINZIDE;ZESTORETIC) 20-25 MG per tablet TAKE 1 TABLET EVERY DAY Yes PRAMOD Ni CNP   gabapentin (NEURONTIN) 300 MG capsule Take 1 capsule by mouth 3 times daily for 30 days. Take 300 mg by mouth 3 times daily as needed for nerve pain.  Yes PRAMOD Ni CNP estradiol (ESTRACE) 1 MG tablet Take 0.5 tablets by mouth daily Yes PRAMOD Rubio - CNP   levothyroxine (SYNTHROID) 150 MCG tablet TAKE 1 TABLET BY MOUTH EVERY DAY  Jonathon Torre MD   Magnesium 200 MG CHEW Take 1 each by mouth daily   Historical Provider, MD   calcipotriene (DOVONEX) 0.005 % cream Apply topically 2 times daily. Jonathon Torre MD   Specialty Vitamins Products (MM BIOTIN/KERATIN PO) Take by mouth  Historical Provider, MD   Cholecalciferol (VITAMIN D3) 2000 UNITS TABS Take 2 tablets by mouth daily  Historical Provider, MD   Omega-3 Fatty Acids (FISH OIL) 1000 MG CAPS Take 3,000 mg by mouth daily  Historical Provider, MD        Social History     Tobacco Use    Smoking status: Every Day     Packs/day: 1.00     Years: 22.00     Pack years: 22.00     Types: Cigarettes    Smokeless tobacco: Never    Tobacco comments:     9-10 per day. Quit successfully and restarted during quarantine 2020. Restarted again -  and neighbors smoke and it makes her want to    Substance Use Topics    Alcohol use: No     Alcohol/week: 0.0 standard drinks        Vitals:    12/06/22 1324   BP: 124/68   Site: Right Upper Arm   Position: Sitting   Cuff Size: Large Adult   Pulse: 76   SpO2: 98%   Weight: 204 lb (92.5 kg)   Height: 5' 2\" (1.575 m)     Estimated body mass index is 37.31 kg/m² as calculated from the following:    Height as of this encounter: 5' 2\" (1.575 m). Weight as of this encounter: 204 lb (92.5 kg). Physical Exam  Vitals reviewed. Constitutional:       General: She is awake. Appearance: Normal appearance. She is well-developed and well-groomed. She is obese. She is not ill-appearing. HENT:      Head: Normocephalic and atraumatic. Right Ear: Hearing, tympanic membrane, ear canal and external ear normal.      Left Ear: Hearing, tympanic membrane, ear canal and external ear normal.      Nose: Nose normal.      Mouth/Throat:      Lips: Pink.       Mouth: Mucous membranes are moist.      Pharynx: Oropharynx is clear. Eyes:      General: Lids are normal.      Extraocular Movements: Extraocular movements intact. Conjunctiva/sclera: Conjunctivae normal.      Pupils: Pupils are equal, round, and reactive to light. Neck:      Thyroid: No thyromegaly. Vascular: No carotid bruit. Cardiovascular:      Rate and Rhythm: Normal rate. Pulses:           Carotid pulses are 2+ on the right side and 2+ on the left side. Radial pulses are 2+ on the right side and 2+ on the left side. Posterior tibial pulses are 2+ on the right side and 2+ on the left side. Heart sounds: Normal heart sounds, S1 normal and S2 normal. No murmur heard. Pulmonary:      Effort: Pulmonary effort is normal.      Breath sounds: Normal breath sounds. Abdominal:      General: Bowel sounds are normal. There is no abdominal bruit. Palpations: Abdomen is soft. Tenderness: There is no abdominal tenderness. Genitourinary:     Comments: Deferred  Musculoskeletal:         General: Normal range of motion. Cervical back: Full passive range of motion without pain, normal range of motion and neck supple. Right lower leg: No edema. Left lower leg: No edema. Lymphadenopathy:      Head:      Right side of head: No submental, submandibular, tonsillar, preauricular, posterior auricular or occipital adenopathy. Left side of head: No submental, submandibular, tonsillar, preauricular, posterior auricular or occipital adenopathy. Cervical: No cervical adenopathy. Right cervical: No superficial, deep or posterior cervical adenopathy. Left cervical: No superficial, deep or posterior cervical adenopathy. Upper Body:      Right upper body: No supraclavicular adenopathy. Left upper body: No supraclavicular adenopathy. Skin:     General: Skin is warm and dry. Capillary Refill: Capillary refill takes less than 2 seconds.    Neurological:      General: No focal deficit present. Mental Status: She is alert and oriented to person, place, and time. Mental status is at baseline. Sensory: Sensation is intact. Motor: Motor function is intact. Coordination: Coordination is intact. Gait: Gait is intact. Psychiatric:         Attention and Perception: Attention and perception normal.         Mood and Affect: Mood and affect normal.         Speech: Speech normal.         Behavior: Behavior normal. Behavior is cooperative. Thought Content: Thought content normal.         Cognition and Memory: Cognition and memory normal.         Judgment: Judgment normal.       ASSESSMENT/PLAN:  1. Medicare annual wellness visit, subsequent   AWV today added onto visit  2. Acquired hypothyroidism  -     TSH with Reflex; Future   Pt takes consistently on empty stomach   Was on armour thyroid  3. Essential hypertension  -     Comprehensive Metabolic Panel; Future  -     lisinopril-hydroCHLOROthiazide (PRINZIDE;ZESTORETIC) 20-25 MG per tablet; TAKE 1 TABLET EVERY DAY, Disp-90 tablet, R-3Normal    BP well controlled at visit today   Follow a low sodium diet   Physical activity 150 minutes weekly recommended    Pt down 20 lbs on Nuu3 gummy supplement   Weight loss, initial goal 10% of body weight recommended  4. Tobacco abuse  -     buPROPion (WELLBUTRIN SR) 150 MG extended release tablet; TAKE 1 TABLET BY MOUTH IN THE MORNING FOR 3 DAYS AND THEN TAKE 1 TABLET BY MOUTH TWICE DAILY WITH 2ND DOSE PRIOR TO 4 PM, Disp-180 tablet, R-3Normal   Pt requested to restart on wellbutrin, restarted smoking in past year, had previously quit smoking  5.  Encounter for tobacco use cessation counseling  -     buPROPion (WELLBUTRIN SR) 150 MG extended release tablet; TAKE 1 TABLET BY MOUTH IN THE MORNING FOR 3 DAYS AND THEN TAKE 1 TABLET BY MOUTH TWICE DAILY WITH 2ND DOSE PRIOR TO 4 PM, Disp-180 tablet, R-3Normal   Tobacco counseling, 5 minutes   Pt  and neighbors smoke, this makes it difficult   Admits she struggles with the cravings but the wellbutrin helps   Use GoodRx to help with cost, around $36 at [facility] with GoodRx for 180 tabs  6. Vitamin D deficiency  -     Vitamin D 25 Hydroxy; Future   Takes Vitamin D daily   Vitamin D helps with immune support, bone health, and energy levels. 7. Asymptomatic postmenopausal state   Recommend DEXA scan   Vitamin D check today  8. Hypercholesteremia  -     Lipid Panel; Future   Work on limiting saturated fats in diet, and eating a healthy balance of fruits, vegetables, lean proteins, and multigrains. The 10-year ASCVD risk score (Britton PENALOZA, et al., 2019) is: 20.2%    Values used to calculate the score:      Age: 71 years      Sex: Female      Is Non- : No      Diabetic: No      Tobacco smoker: Yes      Systolic Blood Pressure: 052 mmHg      Is BP treated: Yes      HDL Cholesterol: 58 mg/dL      Total Cholesterol: 201 mg/dL   Recommend statin based on ASCVD level, will repeat lipid panel today and recheck ASCVD level but likely statin still recommended   Recommend daily 81 mg ASA for heart protectant   Physical activity 150 minutes weekly recommended    Weight loss, initial goal 10% of body weight recommended- great job on losing 10% weight loss already!! Keep up the good work  5. Prediabetes  -     Hemoglobin A1C; Future   A1C 5.7 last year  This does not require medication at this time but a diet moderate in carbohydrates, weight loss with an initial goal of 10% of your body weight (20 lbs), and physical activity of 150 minutes weekly is recommended. We will follow this level yearly. 10. Pneumococcal vaccine administered  -     Pneumococcal, PPSV23, PNEUMOVAX 23, (age 2 yrs+), SC/IM   Information printed and reviewed   Had prevnar 13 in 2017   Pt not eligible for flu vaccine per chart  11. Cervical herniated disc  -     tiZANidine (ZANAFLEX) 4 MG tablet;  Take 1 tablet by mouth nightly as needed (spasms), Disp-90 tablet, R-3Normal  -     meloxicam (MOBIC) 15 MG tablet; TAKE 1 TABLET BY MOUTH EVERY DAY AS NEEDED INFLAMMATION, Disp-90 tablet, R-3Normal  -     gabapentin (NEURONTIN) 300 MG capsule; Take 1 capsule by mouth 3 times daily for 30 days. Take 300 mg by mouth 3 times daily as needed for nerve pain., Disp-90 capsule, R-5Normal  12. Cervical radiculopathy    Pt on gabapentin, does not take consistently TID   Visit every 6 months if needs more than 6 month's worth of refills per year  13. DDD (degenerative disc disease), lumbar  -     tiZANidine (ZANAFLEX) 4 MG tablet; Take 1 tablet by mouth nightly as needed (spasms), Disp-90 tablet, R-3Normal  -     meloxicam (MOBIC) 15 MG tablet; TAKE 1 TABLET BY MOUTH EVERY DAY AS NEEDED INFLAMMATION, Disp-90 tablet, R-3Normal   Pt given 1 year of refills, does not always take meds consistently every day   Tries to get regularly exercise  14. Delayed gastric emptying  -     omeprazole (PRILOSEC) 40 MG delayed release capsule; TAKE 1 CAPSULE EVERY DAY, Disp-90 capsule, R-3Normal   Improving with better diet and weight loss  15. Hot flashes  -     estradiol (ESTRACE) 1 MG tablet; Take 0.5 tablets by mouth daily, Disp-90 tablet, R-3Normal   Takes regularly, tried doing every other day 1/2 tab versus full tab but hot flashes were worse  16. Class 2 obesity due to excess calories without serious comorbidity with body mass index (BMI) of 37.0 to 37.9 in adult  -     ID Behavior  obesity 15m []   Great job with weight loss, keep working on diet and exercise   Weight loss, initial goal 10% of body weight recommended   Physical activity 150 minutes weekly recommended    Continue with Nuu3 if labs stable and no side effects  17. ACP (advance care planning)  -     ID ADVANCED CARE PLAN FACE TO [address], [address] [id number]   Patient wants to be DNR-CCA. Discussed this today. Patient given DNR-CCA form, signed and completed at visit today. Does not want to ever be on a machine.    HCPOA- dgtr Yonathan Pass  Alternates-  Sylvia Callahan, says he may be afraid to make a decision  Cristino Claribel- son  25. Personal history of tobacco use, presenting hazards to health  -     [place] TOBACCO USE CESSATION INTERMEDIATE 3-10 MINUTES [84941]   Tobacco counseling, 5 minutes   Pt  and neighbors smoke, this makes it difficult   Admits she struggles with the cravings but the wellbutrin helps   Use GoodRx to help with cost, around $36 at [facility] with GoodRx for 180 tabs   Recommend annual CT scan for lung cancer screening- call if want CT scan ordered    Care Gaps Addressed  COVID vaccine recommended  Low dose CT scan recommended  DEXA scan recommended  Call insurance company to discuss coverage for shingles vaccine (Shingrix) 2 dose series   PNA vaccine recommended  A1C due - prediabetes  AWV due  Mammo overdue- discussed- pt wants - says if we call her she will do mammo van in spring, does not use [address] [place] [address]    I have reviewed patient's pertinent medical history, relevant laboratory and imaging studies, and past/future health maintenance. Discussed with the patient the importance of adhering to their current medication regimen as directed. Advised the patient that they should continue to work on eating a healthy balanced diet and staying active by exercising within their personal limits. Orders as listed above. Patient was advised to keep future appointments with their respective specialty care team(s). Patient had the opportunity to ask questions, all of which were answered to the best of my ability and with patient satisfaction. Patient understands and is agreeable with the care plan following today's visit. Patient is to schedule an appointment for any new or worsening symptoms. Go to ER for significant shortness of breath, chest pain, or uncontrolled pain or fever. I discussed with patient the risk and benefits of any medications that were prescribed today.  I verified that the patient understands their medications, labs, and/or procedures. The patient is doing well with current medication regimen and does not have any barriers to adherence. The patient's self-management abilities are good. Return in 1 year (on 12/7/2023) for Medicare Annual Wellness Visit in 1 year and fasting labs. An electronic signature was used to authenticate this note. --Magui Larios, APRN - CNP on 12/6/2022 at 2:35 PM  Medicare Annual Wellness Visit    Sujey Joseph is here for Hypertension (HTN FOLLOW UP ), Thyroid Problem (ROUTINE FOLLOW UP ), and Medicare AWV    ASSESSMENT/PLAN:  1. Medicare annual wellness visit, subsequent   AWV today added onto visit  2. Acquired hypothyroidism  -     TSH with Reflex; Future   Pt takes consistently on empty stomach   Was on armour thyroid  3. Essential hypertension  -     Comprehensive Metabolic Panel; Future  -     lisinopril-hydroCHLOROthiazide (PRINZIDE;ZESTORETIC) 20-25 MG per tablet; TAKE 1 TABLET EVERY DAY, Disp-90 tablet, R-3Normal    BP well controlled at visit today   Follow a low sodium diet   Physical activity 150 minutes weekly recommended    Pt down 20 lbs on Nuu3 gummy supplement   Weight loss, initial goal 10% of body weight recommended  4. Tobacco abuse  -     buPROPion (WELLBUTRIN SR) 150 MG extended release tablet; TAKE 1 TABLET BY MOUTH IN THE MORNING FOR 3 DAYS AND THEN TAKE 1 TABLET BY MOUTH TWICE DAILY WITH 2ND DOSE PRIOR TO 4 PM, Disp-180 tablet, R-3Normal   Pt requested to restart on wellbutrin, restarted smoking in past year, had previously quit smoking  5.  Encounter for tobacco use cessation counseling  -     buPROPion (WELLBUTRIN SR) 150 MG extended release tablet; TAKE 1 TABLET BY MOUTH IN THE MORNING FOR 3 DAYS AND THEN TAKE 1 TABLET BY MOUTH TWICE DAILY WITH 2ND DOSE PRIOR TO 4 PM, Disp-180 tablet, R-3Normal   Tobacco counseling, 5 minutes   Pt  and neighbors smoke, this makes it difficult   Admits she struggles with the cravings but the wellbutrin helps   Use GoodRx to help with cost, around $36 at [facility] with GoodRx for 180 tabs  6. Vitamin D deficiency  -     Vitamin D 25 Hydroxy; Future   Takes Vitamin D daily   Vitamin D helps with immune support, bone health, and energy levels. 7. Asymptomatic postmenopausal state   Recommend DEXA scan   Vitamin D check today  8. Hypercholesteremia  -     Lipid Panel; Future   Work on limiting saturated fats in diet, and eating a healthy balance of fruits, vegetables, lean proteins, and multigrains. The 10-year ASCVD risk score (Britton PENALOZA, et al., 2019) is: 20.2%    Values used to calculate the score:      Age: 71 years      Sex: Female      Is Non- : No      Diabetic: No      Tobacco smoker: Yes      Systolic Blood Pressure: 211 mmHg      Is BP treated: Yes      HDL Cholesterol: 58 mg/dL      Total Cholesterol: 201 mg/dL   Recommend statin based on ASCVD level, will repeat lipid panel today and recheck ASCVD level but likely statin still recommended   Recommend daily 81 mg ASA for heart protectant   Physical activity 150 minutes weekly recommended    Weight loss, initial goal 10% of body weight recommended- great job on losing 10% weight loss already!! Keep up the good work  5. Prediabetes  -     Hemoglobin A1C; Future   A1C 5.7 last year  This does not require medication at this time but a diet moderate in carbohydrates, weight loss with an initial goal of 10% of your body weight (20 lbs), and physical activity of 150 minutes weekly is recommended. We will follow this level yearly. 10. Pneumococcal vaccine administered  -     Pneumococcal, PPSV23, PNEUMOVAX 23, (age 2 yrs+), SC/IM   Information printed and reviewed   Had prevnar 13 in 2017   Pt not eligible for flu vaccine per chart  11. Cervical herniated disc  -     tiZANidine (ZANAFLEX) 4 MG tablet;  Take 1 tablet by mouth nightly as needed (spasms), Disp-90 tablet, R-3Normal  -     meloxicam (MOBIC) 15 MG tablet; TAKE 1 may be afraid to make a decision  Rosemarie Adair- son  25. Personal history of tobacco use, presenting hazards to health  -     [place] TOBACCO USE CESSATION INTERMEDIATE 3-10 MINUTES [59264]   Tobacco counseling, 5 minutes   Pt  and neighbors smoke, this makes it difficult   Admits she struggles with the cravings but the wellbutrin helps   Use GoodRx to help with cost, around $36 at [facility] with GoodRx for 180 tabs   Recommend annual CT scan for lung cancer screening- call if want CT scan ordered     Recommendations for Preventive Services Due: see orders and patient instructions/AVS.  Recommended screening schedule for the next 5-10 years is provided to the patient in written form: see Patient Instructions/AVS.     Return in 1 year (on 12/7/2023) for Medicare Annual Wellness Visit in 1 year and fasting labs. Subjective   The following acute and/or chronic problems were also addressed today:  See above- HTN and hypothyroidism    Patient's complete Health Risk Assessment and screening values have been reviewed and are found in Flowsheets. The following problems were reviewed today and where indicated follow up appointments were made and/or referrals ordered. Positive Risk Factor Screenings with Interventions:     Cognitive:   Words recalled: 0 Words Recalled  Clock Drawing Test (CDT): Normal  Total Score Interpretation: Abnormal Mini-Cog  Did the patient refuse to take the cognition test?: No   Tobacco Use:  Tobacco Use: High Risk    Smoking Tobacco Use: Every Day    Smokeless Tobacco Use: Never    Passive Exposure: Not on file     E-cigarette/Vaping       Questions Responses    E-cigarette/Vaping Use Never User    Start Date     Passive Exposure     Quit Date     Counseling Given     Comments           Substance Use - Tobacco Interventions:  tobacco cessation tips and resources provided, patient agrees to tell someone he/she is trying to quit smoking, wants to restart wellbutrin, said it was expensive- reviewed, discussed Frederick Gonzalez and ACP:  General  In general, how would you say your health is?: Very Good  In the past 7 days, have you experienced any of the following: New or Increased Pain, New or Increased Fatigue, Loneliness, Social Isolation, Stress or Anger?: No  Do you get the social and emotional support that you need?: Yes  Do you have a Living Will?: Yes    Advance Directives       Power of Mitchel Collins Will ACP-Advance Directive ACP-Power of     Not on File Not on File Not on File Not on File        General Health Risk Interventions:  No Living Will: has living will, discussed we need a copy on file, DNR -CCA signed today    Health Habits/Nutrition:  Physical Activity: Insufficiently Active    Days of Exercise per Week: 2 days    Minutes of Exercise per Session: 20 min     Have you lost any weight without trying in the past 3 months?: No  Body mass index: (!) 37.31  Have you seen the dentist within the past year?: Yes  Health Habits/Nutrition Interventions:  Inadequate physical activity:  patient agrees to exercise for at least 150 minutes/week, increase physical activity    Hearing/Vision:  Do you or your family notice any trouble with your hearing that hasn't been managed with hearing aids?: (!) Yes  Do you have difficulty driving, watching TV, or doing any of your daily activities because of your eyesight?: (!) Yes  Have you had an eye exam within the past year?: Yes  No results found. Objective   Vitals:    12/06/22 1324   BP: 124/68   Site: Right Upper Arm   Position: Sitting   Cuff Size: Large Adult   Pulse: 76   SpO2: 98%   Weight: 204 lb (92.5 kg)   Height: 5' 2\" (1.575 m)      Body mass index is 37.31 kg/m². Allergies   Allergen Reactions    Codeine      delusions    Prednisone Nausea Only, Palpitations and Other (See Comments)     Dizziness, swollen eye,heart paplitations     Prior to Visit Medications    Medication Sig Taking?  Authorizing Provider buPROPion (WELLBUTRIN SR) 150 MG extended release tablet TAKE 1 TABLET BY MOUTH IN THE MORNING FOR 3 DAYS AND THEN TAKE 1 TABLET BY MOUTH TWICE DAILY WITH 2ND DOSE PRIOR TO 4 PM Yes PRAMOD Stewart CNP   tiZANidine (ZANAFLEX) 4 MG tablet Take 1 tablet by mouth nightly as needed (spasms) Yes PRAMOD Stewart CNP   omeprazole (PRILOSEC) 40 MG delayed release capsule TAKE 1 CAPSULE EVERY DAY Yes PRAMOD Stewart CNP   meloxicam (MOBIC) 15 MG tablet TAKE 1 TABLET BY MOUTH EVERY DAY AS NEEDED INFLAMMATION Yes PRAMOD Stewart CNP   lisinopril-hydroCHLOROthiazide (PRINZIDE;ZESTORETIC) 20-25 MG per tablet TAKE 1 TABLET EVERY DAY Yes PRAMOD Stewart CNP   gabapentin (NEURONTIN) 300 MG capsule Take 1 capsule by mouth 3 times daily for 30 days. Take 300 mg by mouth 3 times daily as needed for nerve pain. Yes PRAMOD Stewart CNP   estradiol (ESTRACE) 1 MG tablet Take 0.5 tablets by mouth daily Yes PRAMOD Stewart CNP   levothyroxine (SYNTHROID) 150 MCG tablet TAKE 1 TABLET BY MOUTH EVERY DAY  Juanita Madera MD   Magnesium 200 MG CHEW Take 1 each by mouth daily   Historical Provider, MD   calcipotriene (DOVONEX) 0.005 % cream Apply topically 2 times daily.   Juanita Madera MD   Specialty Vitamins Products (MM BIOTIN/KERATIN PO) Take by mouth  Historical Provider, MD   Cholecalciferol (VITAMIN D3) 2000 UNITS TABS Take 2 tablets by mouth daily  Historical Provider, MD   Omega-3 Fatty Acids (FISH OIL) 1000 MG CAPS Take 3,000 mg by mouth daily  Historical Provider, MD Jorge (Including outside providers/suppliers regularly involved in providing care):   Patient Care Team:  Juanita Madera MD as PCP - General (Family Medicine)  Juanita Madera MD as PCP - [facility] Empaneled Provider  Venu Pierce MD as Consulting Physician (Pain Management)     Reviewed and updated this visit:  Tobacco  Allergies  Meds  Problems  Med Hx Surg Hx  Soc Hx  Fam Hx               Advance Care Planning   Advanced Care Planning: Discussed the patients choices for care and treatment in case of a health event that adversely affects decision-making abilities. Also discussed the patients long-term treatment options. Reviewed with the patient the appropriate state-specific advance directive documents. Reviewed the process of designating a competent adult as an Agent (or -in-fact) that could take make health care decisions for the patient if incompetent. Patient was asked to complete the declaration forms, either acknowledge the forms by a public notary or an eligible witness and provide a signed copy to the practice office. Time spent (minutes): 30  Patient wants to be DNR-CCA. Discussed this today. Patient given DNR-CCA form, signed and completed at visit today. Does not want to ever be on a machine. HCPOA- dgtr Malu Nevarezs-  [place], says he may be afraid to make a decision  Massimo Theodore- son     Cardiovascular Disease Risk Counseling: Assessed the patient's risk to develop cardiovascular disease and reviewed main risk factors. Reviewed steps to reduce disease risk including:   Quitting tobacco use, reducing amount smoked, or not starting the habit  Making healthy food choices  Being physically active and gradualy increasing activity levels   Reduce weight and determine a healthy BMI goal  Monitor blood pressure and treat if higher than 140/90 mmHg  Maintain blood total cholesterol levels under 5 mmol/l or 190 mg/dl  Maintain LDL cholesterol levels under 3.0 mmol/l or 115 mg/dl   Control blood glucose levels  Consider taking aspirin (75 mg daily), once blood pressure is controlled   Provided a follow up plan. Time spent (minutes): 10  Obesity Counseling: Assessed behavioral health risks and factors affecting choice of behavior.  Suggested weight control approaches, including dietary changes behavioral modification and follow up plan. Provided educational and support documentation. Time spent (minutes): 10- pt has been working on weight loss, gummy- Nuu3  Tobacco Cessation Counseling: Patient advised about behavior change, including information about personal health harms, usage of appropriate cessation measures and benefits of cessation.   Time spent (minutes): 5

## 2022-12-07 DIAGNOSIS — E03.9 ACQUIRED HYPOTHYROIDISM: Primary | ICD-10-CM

## 2022-12-07 DIAGNOSIS — E78.00 HYPERCHOLESTEREMIA: ICD-10-CM

## 2022-12-07 LAB
ESTIMATED AVERAGE GLUCOSE: 114 MG/DL
HBA1C MFR BLD: 5.6 %

## 2022-12-07 RX ORDER — ATORVASTATIN CALCIUM 10 MG/1
10 TABLET, FILM COATED ORAL NIGHTLY
Qty: 90 TABLET | Refills: 3 | Status: SHIPPED | OUTPATIENT
Start: 2022-12-07

## 2022-12-07 RX ORDER — LEVOTHYROXINE SODIUM 137 UG/1
TABLET ORAL
Qty: 90 TABLET | Refills: 0 | Status: SHIPPED | OUTPATIENT
Start: 2022-12-07

## 2023-04-18 NOTE — PATIENT INSTRUCTIONS
at time of . Instead of the fee, you can choose to have the paperwork filled out during a separate office visit that is for filling out the paperwork only. Medication Samples: This office does not carry medication samples. If you need assistance in getting your medications, then please let the medical assistant know so they can help you sign up for a drug assistance program that can help get medications at a reduced cost or even free (if you qualify). Workman's Comp Claims: We do not handle workman's comp cases or claims. You will need to go to an urgent care to be seen or to whomever your employer uses. General - Any abusive/rude behavior toward staff/providers may be cause for dismissal.  Bryan Mcconnell may receive a survey regarding the care you received during your visit. Your input is valuable to us. We encourage you to complete and return your survey. We hope you will choose us in the future for your healthcare needs.

## 2023-04-20 ENCOUNTER — OFFICE VISIT (OUTPATIENT)
Dept: FAMILY MEDICINE CLINIC | Age: 73
End: 2023-04-20

## 2023-04-20 VITALS
OXYGEN SATURATION: 99 % | SYSTOLIC BLOOD PRESSURE: 128 MMHG | DIASTOLIC BLOOD PRESSURE: 82 MMHG | HEIGHT: 61 IN | WEIGHT: 195 LBS | BODY MASS INDEX: 36.82 KG/M2 | HEART RATE: 90 BPM

## 2023-04-20 DIAGNOSIS — Z01.818 PREOP GENERAL PHYSICAL EXAM: ICD-10-CM

## 2023-04-20 DIAGNOSIS — M51.36 DDD (DEGENERATIVE DISC DISEASE), LUMBAR: ICD-10-CM

## 2023-04-20 DIAGNOSIS — E03.9 ACQUIRED HYPOTHYROIDISM: ICD-10-CM

## 2023-04-20 DIAGNOSIS — H25.019 CORTICAL AGE-RELATED CATARACT, UNSPECIFIED LATERALITY: Primary | ICD-10-CM

## 2023-04-20 DIAGNOSIS — I10 ESSENTIAL HYPERTENSION: ICD-10-CM

## 2023-04-20 DIAGNOSIS — M50.20 CERVICAL HERNIATED DISC: ICD-10-CM

## 2023-04-20 DIAGNOSIS — E78.00 HYPERCHOLESTEREMIA: ICD-10-CM

## 2023-04-20 LAB
T4 FREE SERPL-MCNC: 1.9 NG/DL (ref 0.9–1.8)
TSH SERPL DL<=0.005 MIU/L-ACNC: 0.02 UIU/ML (ref 0.27–4.2)

## 2023-04-20 RX ORDER — LISINOPRIL AND HYDROCHLOROTHIAZIDE 20; 12.5 MG/1; MG/1
1 TABLET ORAL DAILY
COMMUNITY

## 2023-04-20 ASSESSMENT — PATIENT HEALTH QUESTIONNAIRE - PHQ9
1. LITTLE INTEREST OR PLEASURE IN DOING THINGS: 0
SUM OF ALL RESPONSES TO PHQ QUESTIONS 1-9: 0
2. FEELING DOWN, DEPRESSED OR HOPELESS: 0
SUM OF ALL RESPONSES TO PHQ9 QUESTIONS 1 & 2: 0
SUM OF ALL RESPONSES TO PHQ QUESTIONS 1-9: 0

## 2023-04-20 NOTE — PROGRESS NOTES
Risk Index Risk factors: None  Measurement of Exercise Tolerance before Surgery >4 Yes    According to the 2014 ACC/AHA pre-operative risk assessment guidelines Rei Montero is a low risk for major cardiac complications during a low risk procedure and may continue as planned. Specific medication recommendations are listed below. Medications recommended to continue should be taken with a sip of water even when NPO.      Further recommendations from consultants: None    Medication Recommendations:  No asa/ nsaids/ vitamins 1 week prior to surgery at discretion of eye specialist      Patsy Raines MD, MD  4/20/2023  1:14 PM

## 2023-04-21 DIAGNOSIS — E03.9 ACQUIRED HYPOTHYROIDISM: ICD-10-CM

## 2023-04-21 RX ORDER — LEVOTHYROXINE SODIUM 0.12 MG/1
125 TABLET ORAL DAILY
Qty: 90 TABLET | Refills: 0 | Status: SHIPPED | OUTPATIENT
Start: 2023-04-21

## 2023-04-24 ENCOUNTER — ANESTHESIA EVENT (OUTPATIENT)
Dept: SURGERY | Age: 73
End: 2023-04-24
Payer: MEDICARE

## 2023-04-24 RX ORDER — SODIUM CHLORIDE 0.9 % (FLUSH) 0.9 %
5-40 SYRINGE (ML) INJECTION PRN
OUTPATIENT
Start: 2023-04-24

## 2023-04-24 RX ORDER — SODIUM CHLORIDE 0.9 % (FLUSH) 0.9 %
5-40 SYRINGE (ML) INJECTION EVERY 12 HOURS SCHEDULED
OUTPATIENT
Start: 2023-04-24

## 2023-04-24 RX ORDER — SODIUM CHLORIDE 9 MG/ML
INJECTION, SOLUTION INTRAVENOUS PRN
OUTPATIENT
Start: 2023-04-24

## 2023-04-27 ENCOUNTER — PREP FOR PROCEDURE (OUTPATIENT)
Dept: OPHTHALMOLOGY | Age: 73
End: 2023-04-27

## 2023-04-27 RX ORDER — SODIUM CHLORIDE 9 MG/ML
INJECTION, SOLUTION INTRAVENOUS PRN
Status: CANCELLED | OUTPATIENT
Start: 2023-04-27

## 2023-04-27 RX ORDER — TETRACAINE HYDROCHLORIDE 5 MG/ML
1 SOLUTION OPHTHALMIC SEE ADMIN INSTRUCTIONS
Status: CANCELLED | OUTPATIENT
Start: 2023-04-27

## 2023-04-27 RX ORDER — TROPICAMIDE 10 MG/ML
1 SOLUTION/ DROPS OPHTHALMIC SEE ADMIN INSTRUCTIONS
Status: CANCELLED | OUTPATIENT
Start: 2023-04-27

## 2023-04-27 RX ORDER — SODIUM CHLORIDE 0.9 % (FLUSH) 0.9 %
5-40 SYRINGE (ML) INJECTION PRN
Status: CANCELLED | OUTPATIENT
Start: 2023-04-27

## 2023-04-27 RX ORDER — CIPROFLOXACIN HYDROCHLORIDE 3.5 MG/ML
1 SOLUTION/ DROPS TOPICAL SEE ADMIN INSTRUCTIONS
Status: CANCELLED | OUTPATIENT
Start: 2023-04-27

## 2023-04-27 RX ORDER — SODIUM CHLORIDE 0.9 % (FLUSH) 0.9 %
5-40 SYRINGE (ML) INJECTION EVERY 12 HOURS SCHEDULED
Status: CANCELLED | OUTPATIENT
Start: 2023-04-27

## 2023-04-27 RX ORDER — PHENYLEPHRINE HCL 2.5 %
1 DROPS OPHTHALMIC (EYE) SEE ADMIN INSTRUCTIONS
Status: CANCELLED | OUTPATIENT
Start: 2023-04-27

## 2023-04-27 RX ORDER — KETOROLAC TROMETHAMINE 5 MG/ML
1 SOLUTION OPHTHALMIC SEE ADMIN INSTRUCTIONS
Status: CANCELLED | OUTPATIENT
Start: 2023-04-27

## 2023-04-27 NOTE — PERIOP NOTE
[address] [facility]  [phone]        Pre-Op Phone Call:     Patient Name: Jefferson eD Anda     Telephone Information:   Mobile [phone]     Home phone:  [phone]    Surgery Time:    7:30 AM     Arrival Time:  6:15     Left extended Message:  Yes     Message left with: Spoke with patient      Recent change in health status:  No     Advised of transportation/ policy:  Yes     NPO policy reviewed: Yes     Advised to take morning heart/blood pressure medications with sips of water morning of surgery? Yes     Instructed to bring eye drops, photo identification, and insurance card day of surgery? Yes     Advised to wear short sleeved button down shirt (no T-shirt underneath):  Yes     Advised not to wear jewelry, hairpins, or pantyhose day of surgery? Yes     Advised not to wear make-up and to wash face day of surgery?   Yes    Remarks: Spoke with patient         Electronically signed by:  Alphonso Mckeon RN at 4/27/2023 9:31 AM

## 2023-04-28 ENCOUNTER — ANESTHESIA (OUTPATIENT)
Dept: SURGERY | Age: 73
End: 2023-04-28
Payer: MEDICARE

## 2023-04-28 ENCOUNTER — HOSPITAL ENCOUNTER (OUTPATIENT)
Age: 73
Setting detail: OUTPATIENT SURGERY
Discharge: HOME | End: 2023-04-28
Attending: OPHTHALMOLOGY | Admitting: OPHTHALMOLOGY
Payer: MEDICARE

## 2023-04-28 VITALS
DIASTOLIC BLOOD PRESSURE: 64 MMHG | HEART RATE: 69 BPM | BODY MASS INDEX: 34.36 KG/M2 | SYSTOLIC BLOOD PRESSURE: 127 MMHG | OXYGEN SATURATION: 98 % | HEIGHT: 61 IN | WEIGHT: 182 LBS | RESPIRATION RATE: 20 BRPM | TEMPERATURE: 97.8 F

## 2023-04-28 PROCEDURE — 3700000001 HC ADD 15 MINUTES (ANESTHESIA): Performed by: OPHTHALMOLOGY

## 2023-04-28 PROCEDURE — 6360000002 HC RX W HCPCS: Performed by: NURSE ANESTHETIST, CERTIFIED REGISTERED

## 2023-04-28 PROCEDURE — 3600000014 HC SURGERY LEVEL 4 ADDTL 15MIN: Performed by: OPHTHALMOLOGY

## 2023-04-28 PROCEDURE — 3600000004 HC SURGERY LEVEL 4 BASE: Performed by: OPHTHALMOLOGY

## 2023-04-28 PROCEDURE — 6370000000 HC RX 637 (ALT 250 FOR IP): Performed by: OPHTHALMOLOGY

## 2023-04-28 PROCEDURE — 2580000003 HC RX 258: Performed by: NURSE ANESTHETIST, CERTIFIED REGISTERED

## 2023-04-28 PROCEDURE — 3700000000 HC ANESTHESIA ATTENDED CARE: Performed by: OPHTHALMOLOGY

## 2023-04-28 PROCEDURE — 2500000003 HC RX 250 WO HCPCS: Performed by: OPHTHALMOLOGY

## 2023-04-28 PROCEDURE — 2709999900 HC NON-CHARGEABLE SUPPLY: Performed by: OPHTHALMOLOGY

## 2023-04-28 PROCEDURE — 7100000010 HC PHASE II RECOVERY - FIRST 15 MIN: Performed by: OPHTHALMOLOGY

## 2023-04-28 PROCEDURE — 2580000003 HC RX 258: Performed by: STUDENT IN AN ORGANIZED HEALTH CARE EDUCATION/TRAINING PROGRAM

## 2023-04-28 PROCEDURE — V2632 POST CHMBR INTRAOCULAR LENS: HCPCS | Performed by: OPHTHALMOLOGY

## 2023-04-28 DEVICE — LENS CLAREON IOL 20.5D: Type: IMPLANTABLE DEVICE | Site: ANTERIOR CHAMBER | Status: FUNCTIONAL

## 2023-04-28 RX ORDER — SODIUM CHLORIDE 0.9 % (FLUSH) 0.9 %
5-40 SYRINGE (ML) INJECTION PRN
Status: DISCONTINUED | OUTPATIENT
Start: 2023-04-28 | End: 2023-04-28 | Stop reason: SDUPTHER

## 2023-04-28 RX ORDER — SODIUM CHLORIDE 0.9 % (FLUSH) 0.9 %
5-40 SYRINGE (ML) INJECTION EVERY 12 HOURS SCHEDULED
Status: DISCONTINUED | OUTPATIENT
Start: 2023-04-28 | End: 2023-04-28 | Stop reason: SDUPTHER

## 2023-04-28 RX ORDER — ONDANSETRON 2 MG/ML
4 INJECTION INTRAMUSCULAR; INTRAVENOUS
Status: CANCELLED | OUTPATIENT
Start: 2023-04-28 | End: 2023-04-29

## 2023-04-28 RX ORDER — PHENYLEPHRINE HCL 2.5 %
1 DROPS OPHTHALMIC (EYE) SEE ADMIN INSTRUCTIONS
Status: COMPLETED | OUTPATIENT
Start: 2023-04-28 | End: 2023-04-28

## 2023-04-28 RX ORDER — CIPROFLOXACIN HYDROCHLORIDE 3.5 MG/ML
1 SOLUTION/ DROPS TOPICAL SEE ADMIN INSTRUCTIONS
Status: DISCONTINUED | OUTPATIENT
Start: 2023-04-28 | End: 2023-04-28 | Stop reason: HOSPADM

## 2023-04-28 RX ORDER — SODIUM CHLORIDE 0.9 % (FLUSH) 0.9 %
5-40 SYRINGE (ML) INJECTION EVERY 12 HOURS SCHEDULED
Status: CANCELLED | OUTPATIENT
Start: 2023-04-28

## 2023-04-28 RX ORDER — MIDAZOLAM HYDROCHLORIDE 1 MG/ML
INJECTION INTRAMUSCULAR; INTRAVENOUS PRN
Status: DISCONTINUED | OUTPATIENT
Start: 2023-04-28 | End: 2023-04-28 | Stop reason: SDUPTHER

## 2023-04-28 RX ORDER — BRIMONIDINE TARTRATE 2 MG/ML
SOLUTION/ DROPS OPHTHALMIC
Status: COMPLETED | OUTPATIENT
Start: 2023-04-28 | End: 2023-04-28

## 2023-04-28 RX ORDER — TETRACAINE HYDROCHLORIDE 5 MG/ML
SOLUTION OPHTHALMIC
Status: COMPLETED | OUTPATIENT
Start: 2023-04-28 | End: 2023-04-28

## 2023-04-28 RX ORDER — DIPHENHYDRAMINE HYDROCHLORIDE 50 MG/ML
12.5 INJECTION INTRAMUSCULAR; INTRAVENOUS
Status: CANCELLED | OUTPATIENT
Start: 2023-04-28 | End: 2023-04-29

## 2023-04-28 RX ORDER — SODIUM CHLORIDE 9 MG/ML
INJECTION, SOLUTION INTRAVENOUS PRN
Status: DISCONTINUED | OUTPATIENT
Start: 2023-04-28 | End: 2023-04-28 | Stop reason: SDUPTHER

## 2023-04-28 RX ORDER — SODIUM CHLORIDE 9 MG/ML
INJECTION, SOLUTION INTRAVENOUS PRN
Status: DISCONTINUED | OUTPATIENT
Start: 2023-04-28 | End: 2023-04-28 | Stop reason: HOSPADM

## 2023-04-28 RX ORDER — SODIUM CHLORIDE 0.9 % (FLUSH) 0.9 %
5-40 SYRINGE (ML) INJECTION EVERY 12 HOURS SCHEDULED
Status: DISCONTINUED | OUTPATIENT
Start: 2023-04-28 | End: 2023-04-28 | Stop reason: HOSPADM

## 2023-04-28 RX ORDER — SODIUM CHLORIDE 0.9 % (FLUSH) 0.9 %
5-40 SYRINGE (ML) INJECTION PRN
Status: DISCONTINUED | OUTPATIENT
Start: 2023-04-28 | End: 2023-04-28 | Stop reason: HOSPADM

## 2023-04-28 RX ORDER — LIDOCAINE HYDROCHLORIDE 10 MG/ML
INJECTION, SOLUTION EPIDURAL; INFILTRATION; INTRACAUDAL; PERINEURAL
Status: COMPLETED | OUTPATIENT
Start: 2023-04-28 | End: 2023-04-28

## 2023-04-28 RX ORDER — BALANCED SALT SOLUTION 6.4; .75; .48; .3; 3.9; 1.7 MG/ML; MG/ML; MG/ML; MG/ML; MG/ML; MG/ML
SOLUTION OPHTHALMIC
Status: COMPLETED | OUTPATIENT
Start: 2023-04-28 | End: 2023-04-28

## 2023-04-28 RX ORDER — FENTANYL CITRATE 50 UG/ML
INJECTION, SOLUTION INTRAMUSCULAR; INTRAVENOUS PRN
Status: DISCONTINUED | OUTPATIENT
Start: 2023-04-28 | End: 2023-04-28 | Stop reason: SDUPTHER

## 2023-04-28 RX ORDER — TROPICAMIDE 10 MG/ML
1 SOLUTION/ DROPS OPHTHALMIC SEE ADMIN INSTRUCTIONS
Status: COMPLETED | OUTPATIENT
Start: 2023-04-28 | End: 2023-04-28

## 2023-04-28 RX ORDER — KETOROLAC TROMETHAMINE 5 MG/ML
1 SOLUTION OPHTHALMIC SEE ADMIN INSTRUCTIONS
Status: COMPLETED | OUTPATIENT
Start: 2023-04-28 | End: 2023-04-28

## 2023-04-28 RX ORDER — OFLOXACIN 3 MG/ML
SOLUTION/ DROPS OPHTHALMIC
Status: COMPLETED | OUTPATIENT
Start: 2023-04-28 | End: 2023-04-28

## 2023-04-28 RX ORDER — TETRACAINE HYDROCHLORIDE 5 MG/ML
1 SOLUTION OPHTHALMIC SEE ADMIN INSTRUCTIONS
Status: DISCONTINUED | OUTPATIENT
Start: 2023-04-28 | End: 2023-04-28 | Stop reason: HOSPADM

## 2023-04-28 RX ORDER — SODIUM CHLORIDE 9 MG/ML
INJECTION, SOLUTION INTRAVENOUS CONTINUOUS PRN
Status: DISCONTINUED | OUTPATIENT
Start: 2023-04-28 | End: 2023-04-28 | Stop reason: SDUPTHER

## 2023-04-28 RX ORDER — SODIUM CHLORIDE 0.9 % (FLUSH) 0.9 %
5-40 SYRINGE (ML) INJECTION PRN
Status: CANCELLED | OUTPATIENT
Start: 2023-04-28

## 2023-04-28 RX ORDER — SODIUM CHLORIDE 9 MG/ML
INJECTION, SOLUTION INTRAVENOUS PRN
Status: CANCELLED | OUTPATIENT
Start: 2023-04-28

## 2023-04-28 RX ADMIN — KETOROLAC TROMETHAMINE 1 DROP: 0.5 SOLUTION OPHTHALMIC at 06:50

## 2023-04-28 RX ADMIN — SODIUM CHLORIDE: 9 INJECTION, SOLUTION INTRAVENOUS at 07:22

## 2023-04-28 RX ADMIN — TROPICAMIDE 1 DROP: 10 SOLUTION/ DROPS OPHTHALMIC at 06:55

## 2023-04-28 RX ADMIN — SODIUM CHLORIDE: 9 INJECTION, SOLUTION INTRAVENOUS at 06:57

## 2023-04-28 RX ADMIN — CIPROFLOXACIN 1 DROP: 3 SOLUTION OPHTHALMIC at 06:50

## 2023-04-28 RX ADMIN — TROPICAMIDE 1 DROP: 10 SOLUTION/ DROPS OPHTHALMIC at 07:00

## 2023-04-28 RX ADMIN — TETRACAINE HYDROCHLORIDE 1 DROP: 5 SOLUTION OPHTHALMIC at 06:50

## 2023-04-28 RX ADMIN — PHENYLEPHRINE HYDROCHLORIDE 1 DROP: 25 SOLUTION/ DROPS OPHTHALMIC at 06:50

## 2023-04-28 RX ADMIN — POVIDONE-IODINE: 5 SOLUTION OPHTHALMIC at 07:00

## 2023-04-28 RX ADMIN — TROPICAMIDE 1 DROP: 10 SOLUTION/ DROPS OPHTHALMIC at 06:50

## 2023-04-28 RX ADMIN — PHENYLEPHRINE HYDROCHLORIDE 1 DROP: 25 SOLUTION/ DROPS OPHTHALMIC at 07:00

## 2023-04-28 RX ADMIN — FENTANYL CITRATE 25 MCG: 50 INJECTION INTRAMUSCULAR; INTRAVENOUS at 07:31

## 2023-04-28 RX ADMIN — PHENYLEPHRINE HYDROCHLORIDE 1 DROP: 25 SOLUTION/ DROPS OPHTHALMIC at 06:55

## 2023-04-28 RX ADMIN — MIDAZOLAM 2 MG: 1 INJECTION INTRAMUSCULAR; INTRAVENOUS at 07:26

## 2023-04-28 ASSESSMENT — PAIN SCALES - GENERAL
PAINLEVEL_OUTOF10: 0

## 2023-04-28 ASSESSMENT — LIFESTYLE VARIABLES: SMOKING_STATUS: 1

## 2023-04-28 NOTE — ANESTHESIA POSTPROCEDURE EVALUATION
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     Santiago Phase I: Santiago Score: 10    Santiago Phase II: Santiago Score: 10      Anesthesia Post Evaluation    Patient location during evaluation: bedside  Patient participation: complete - patient participated  Level of consciousness: awake and alert  Pain score: 0  Nausea & Vomiting: no nausea  Complications: no  Cardiovascular status: hemodynamically stable  Respiratory status: acceptable  Hydration status: stable

## 2023-04-28 NOTE — ANESTHESIA PRE PROCEDURE
restarted during quarantine 2020. Restarted again -  and neighbors smoke and it makes her want to       Vital Signs (Current):   Vitals:    04/20/23 1125 04/28/23 0645   BP:  103/70   Pulse:  78   Resp:  18   Temp:  97.2 °F (36.2 °C)   TempSrc:  Temporal   SpO2:  97%   Weight: 182 lb (82.6 kg) 182 lb (82.6 kg)   Height: 5' 1\" (1.549 m) 5' 1\" (1.549 m)                                              BP Readings from Last 3 Encounters:   04/28/23 103/70   04/20/23 128/82   12/06/22 124/68       NPO Status: Time of last liquid consumption: 1700                        Time of last solid consumption: 1700                        Date of last liquid consumption: 04/27/23                        Date of last solid food consumption: 04/27/23    BMI:   Wt Readings from Last 3 Encounters:   04/28/23 182 lb (82.6 kg)   04/20/23 195 lb (88.5 kg)   12/06/22 204 lb (92.5 kg)     Body mass index is 34.39 kg/m².     CBC:   Lab Results   Component Value Date/Time    WBC 7.9 08/18/2021 11:27 AM    RBC 4.66 08/18/2021 11:27 AM    HGB 13.4 08/18/2021 11:27 AM    HCT 40.3 08/18/2021 11:27 AM    MCV 86.4 08/18/2021 11:27 AM    RDW 13.8 08/18/2021 11:27 AM     08/18/2021 11:27 AM       CMP:   Lab Results   Component Value Date/Time     12/06/2022 02:30 PM    K 4.5 12/06/2022 02:30 PM     12/06/2022 02:30 PM    CO2 25 12/06/2022 02:30 PM    BUN 11 12/06/2022 02:30 PM    CREATININE <0.5 12/06/2022 02:30 PM    GFRAA >60 08/18/2021 11:27 AM    GFRAA >60 11/28/2012 08:57 AM    AGRATIO 1.3 12/06/2022 02:30 PM    LABGLOM >60 12/06/2022 02:30 PM    GLUCOSE 94 12/06/2022 02:30 PM    GLUCOSE 106 06/09/2011 04:38 PM    PROT 7.5 12/06/2022 02:30 PM    PROT 6.8 11/28/2012 08:57 AM    CALCIUM 9.9 12/06/2022 02:30 PM    BILITOT 0.3 12/06/2022 02:30 PM    ALKPHOS 99 12/06/2022 02:30 PM    AST 15 12/06/2022 02:30 PM    ALT 16 12/06/2022 02:30 PM       POC Tests: No results for input(s): POCGLU, POCNA, POCK, POCCL, POCBUN, POCHEMO,

## 2023-04-28 NOTE — DISCHARGE INSTRUCTIONS
Post-Operative Instructions After Cataract Surgery  [facility]   Kreg Sacks, M.D.    [phone]    left    @ED@    Patient Name: Amita Price  : 1950  MRN: [id number]      Wear your protective shield at bedtime and nap time for 1 week to prevent accidentally rubbing or bumping your eye. The post-operative drops are VERY IMPORTANT. Use them as directed on the drop schedule given to you the day of surgery. Do not lift over 25 lbs for the first week. DO NOT RUB YOUR EYE. You may wash your hair 24 hours after surgery, but avoid getting water in your eye for the first 5 days. Use a dry wash cloth to protect water from getting in your eye while taking a shower. No eye makeup for 1 week. You may return to work anytime provided your job does not require heavy lifting or straining. If you work in a carlos environment, please take one week off work after surgery. CALL THE OFFICE IMMEDIATELY IF YOU EXPERIENCE ANY OF THE FOLLOWING                   [phone]  Veil or curtain coming across vision. Sudden decrease in vision. Shower of NEW floaters. Increase in pain. Flashes of light.

## 2023-05-11 ENCOUNTER — ANESTHESIA EVENT (OUTPATIENT)
Dept: SURGERY | Age: 73
End: 2023-05-11
Payer: MEDICARE

## 2023-05-11 RX ORDER — SODIUM CHLORIDE 0.9 % (FLUSH) 0.9 %
5-40 SYRINGE (ML) INJECTION PRN
Status: CANCELLED | OUTPATIENT
Start: 2023-05-11

## 2023-05-11 RX ORDER — PHENYLEPHRINE HCL 2.5 %
1 DROPS OPHTHALMIC (EYE) SEE ADMIN INSTRUCTIONS
Status: CANCELLED | OUTPATIENT
Start: 2023-05-11

## 2023-05-11 RX ORDER — KETOROLAC TROMETHAMINE 5 MG/ML
1 SOLUTION OPHTHALMIC SEE ADMIN INSTRUCTIONS
Status: CANCELLED | OUTPATIENT
Start: 2023-05-11

## 2023-05-11 RX ORDER — CIPROFLOXACIN HYDROCHLORIDE 3.5 MG/ML
1 SOLUTION/ DROPS TOPICAL SEE ADMIN INSTRUCTIONS
Status: CANCELLED | OUTPATIENT
Start: 2023-05-11

## 2023-05-11 RX ORDER — SODIUM CHLORIDE 0.9 % (FLUSH) 0.9 %
5-40 SYRINGE (ML) INJECTION EVERY 12 HOURS SCHEDULED
Status: CANCELLED | OUTPATIENT
Start: 2023-05-11

## 2023-05-11 RX ORDER — SODIUM CHLORIDE 9 MG/ML
INJECTION, SOLUTION INTRAVENOUS PRN
Status: CANCELLED | OUTPATIENT
Start: 2023-05-11

## 2023-05-11 RX ORDER — TROPICAMIDE 10 MG/ML
1 SOLUTION/ DROPS OPHTHALMIC SEE ADMIN INSTRUCTIONS
Status: CANCELLED | OUTPATIENT
Start: 2023-05-11

## 2023-05-11 RX ORDER — TETRACAINE HYDROCHLORIDE 5 MG/ML
1 SOLUTION OPHTHALMIC SEE ADMIN INSTRUCTIONS
Status: CANCELLED | OUTPATIENT
Start: 2023-05-11

## 2023-05-11 NOTE — PRE-PROCEDURE INSTRUCTIONS
[address] [facility]  [phone]        Pre-Op Phone Call:     Patient Name: Shane Salazar     Telephone Information:   Mobile [phone]     Home phone:  [phone]    Surgery Time:    7:50 AM     Arrival Time:  8426     Left extended Message:  {YES/NO:19732}     Message left with:     Recent change in health status:  {YES/NO:19732}     Advised of transportation/ policy:  {NGP/IX:75252}     NPO policy reviewed:  {ZSANDOR/LJ:38940}     Advised to take morning heart/blood pressure medications with sips of water morning of surgery? {YES/NO:19732}     Instructed to bring eye drops, photo identification, and insurance card day of surgery? {YES/NO:19732}     Advised to wear short sleeved button down shirt (no T-shirt underneath):  {YES/NO:19732}     Advised not to wear jewelry, hairpins, or pantyhose day of surgery? {YES/NO:19732}     Advised not to wear make-up and to wash face day of surgery?   {YES/NO:19732}    Remarks:        Electronically signed by:  Suresh Stephens RN at 5/11/2023 9:46 AM

## 2023-05-12 ENCOUNTER — ANESTHESIA (OUTPATIENT)
Dept: SURGERY | Age: 73
End: 2023-05-12
Payer: MEDICARE

## 2023-05-12 ENCOUNTER — HOSPITAL ENCOUNTER (OUTPATIENT)
Age: 73
Setting detail: OUTPATIENT SURGERY
Discharge: HOME | End: 2023-05-12
Attending: OPHTHALMOLOGY | Admitting: OPHTHALMOLOGY
Payer: MEDICARE

## 2023-05-12 VITALS
OXYGEN SATURATION: 97 % | WEIGHT: 182 LBS | SYSTOLIC BLOOD PRESSURE: 124 MMHG | DIASTOLIC BLOOD PRESSURE: 54 MMHG | HEIGHT: 61 IN | TEMPERATURE: 97 F | HEART RATE: 71 BPM | RESPIRATION RATE: 18 BRPM | BODY MASS INDEX: 34.36 KG/M2

## 2023-05-12 PROCEDURE — 2709999900 HC NON-CHARGEABLE SUPPLY: Performed by: OPHTHALMOLOGY

## 2023-05-12 PROCEDURE — 2500000003 HC RX 250 WO HCPCS: Performed by: OPHTHALMOLOGY

## 2023-05-12 PROCEDURE — 3700000000 HC ANESTHESIA ATTENDED CARE: Performed by: OPHTHALMOLOGY

## 2023-05-12 PROCEDURE — 3700000001 HC ADD 15 MINUTES (ANESTHESIA): Performed by: OPHTHALMOLOGY

## 2023-05-12 PROCEDURE — V2632 POST CHMBR INTRAOCULAR LENS: HCPCS | Performed by: OPHTHALMOLOGY

## 2023-05-12 PROCEDURE — 6370000000 HC RX 637 (ALT 250 FOR IP): Performed by: OPHTHALMOLOGY

## 2023-05-12 PROCEDURE — 3600000014 HC SURGERY LEVEL 4 ADDTL 15MIN: Performed by: OPHTHALMOLOGY

## 2023-05-12 PROCEDURE — 6360000002 HC RX W HCPCS: Performed by: NURSE ANESTHETIST, CERTIFIED REGISTERED

## 2023-05-12 PROCEDURE — 7100000010 HC PHASE II RECOVERY - FIRST 15 MIN: Performed by: OPHTHALMOLOGY

## 2023-05-12 PROCEDURE — 3600000004 HC SURGERY LEVEL 4 BASE: Performed by: OPHTHALMOLOGY

## 2023-05-12 PROCEDURE — 2580000003 HC RX 258: Performed by: ANESTHESIOLOGY

## 2023-05-12 DEVICE — LENS CLAREON IOL 21.0D: Type: IMPLANTABLE DEVICE | Site: EYE | Status: FUNCTIONAL

## 2023-05-12 RX ORDER — SODIUM CHLORIDE 0.9 % (FLUSH) 0.9 %
5-40 SYRINGE (ML) INJECTION EVERY 12 HOURS SCHEDULED
Status: DISCONTINUED | OUTPATIENT
Start: 2023-05-12 | End: 2023-05-12 | Stop reason: HOSPADM

## 2023-05-12 RX ORDER — PHENYLEPHRINE HCL 2.5 %
1 DROPS OPHTHALMIC (EYE) SEE ADMIN INSTRUCTIONS
Status: DISCONTINUED | OUTPATIENT
Start: 2023-05-12 | End: 2023-05-12 | Stop reason: HOSPADM

## 2023-05-12 RX ORDER — SODIUM CHLORIDE 9 MG/ML
INJECTION, SOLUTION INTRAVENOUS PRN
Status: DISCONTINUED | OUTPATIENT
Start: 2023-05-12 | End: 2023-05-12 | Stop reason: HOSPADM

## 2023-05-12 RX ORDER — SODIUM CHLORIDE 0.9 % (FLUSH) 0.9 %
5-40 SYRINGE (ML) INJECTION PRN
Status: DISCONTINUED | OUTPATIENT
Start: 2023-05-12 | End: 2023-05-12 | Stop reason: HOSPADM

## 2023-05-12 RX ORDER — PHENYLEPHRINE HYDROCHLORIDE 100 MG/ML
1 SOLUTION/ DROPS OPHTHALMIC ONCE
Status: COMPLETED | OUTPATIENT
Start: 2023-05-12 | End: 2023-05-12

## 2023-05-12 RX ORDER — TETRACAINE HYDROCHLORIDE 5 MG/ML
SOLUTION OPHTHALMIC
Status: COMPLETED | OUTPATIENT
Start: 2023-05-12 | End: 2023-05-12

## 2023-05-12 RX ORDER — SODIUM CHLORIDE 0.9 % (FLUSH) 0.9 %
5-40 SYRINGE (ML) INJECTION PRN
Status: DISCONTINUED | OUTPATIENT
Start: 2023-05-12 | End: 2023-05-12 | Stop reason: SDUPTHER

## 2023-05-12 RX ORDER — SODIUM CHLORIDE 9 MG/ML
INJECTION, SOLUTION INTRAVENOUS PRN
Status: DISCONTINUED | OUTPATIENT
Start: 2023-05-12 | End: 2023-05-12 | Stop reason: SDUPTHER

## 2023-05-12 RX ORDER — TETRACAINE HYDROCHLORIDE 5 MG/ML
1 SOLUTION OPHTHALMIC SEE ADMIN INSTRUCTIONS
Status: DISCONTINUED | OUTPATIENT
Start: 2023-05-12 | End: 2023-05-12 | Stop reason: HOSPADM

## 2023-05-12 RX ORDER — CIPROFLOXACIN HYDROCHLORIDE 3.5 MG/ML
1 SOLUTION/ DROPS TOPICAL SEE ADMIN INSTRUCTIONS
Status: DISCONTINUED | OUTPATIENT
Start: 2023-05-12 | End: 2023-05-12 | Stop reason: HOSPADM

## 2023-05-12 RX ORDER — FENTANYL CITRATE 50 UG/ML
INJECTION, SOLUTION INTRAMUSCULAR; INTRAVENOUS PRN
Status: DISCONTINUED | OUTPATIENT
Start: 2023-05-12 | End: 2023-05-12 | Stop reason: SDUPTHER

## 2023-05-12 RX ORDER — MIDAZOLAM HYDROCHLORIDE 1 MG/ML
INJECTION INTRAMUSCULAR; INTRAVENOUS PRN
Status: DISCONTINUED | OUTPATIENT
Start: 2023-05-12 | End: 2023-05-12 | Stop reason: SDUPTHER

## 2023-05-12 RX ORDER — LIDOCAINE HYDROCHLORIDE 10 MG/ML
INJECTION, SOLUTION EPIDURAL; INFILTRATION; INTRACAUDAL; PERINEURAL
Status: COMPLETED | OUTPATIENT
Start: 2023-05-12 | End: 2023-05-12

## 2023-05-12 RX ORDER — SODIUM CHLORIDE 0.9 % (FLUSH) 0.9 %
5-40 SYRINGE (ML) INJECTION EVERY 12 HOURS SCHEDULED
Status: DISCONTINUED | OUTPATIENT
Start: 2023-05-12 | End: 2023-05-12 | Stop reason: SDUPTHER

## 2023-05-12 RX ORDER — OFLOXACIN 3 MG/ML
SOLUTION/ DROPS OPHTHALMIC
Status: COMPLETED | OUTPATIENT
Start: 2023-05-12 | End: 2023-05-12

## 2023-05-12 RX ORDER — BRIMONIDINE TARTRATE 2 MG/ML
SOLUTION/ DROPS OPHTHALMIC
Status: COMPLETED | OUTPATIENT
Start: 2023-05-12 | End: 2023-05-12

## 2023-05-12 RX ORDER — BALANCED SALT SOLUTION 6.4; .75; .48; .3; 3.9; 1.7 MG/ML; MG/ML; MG/ML; MG/ML; MG/ML; MG/ML
SOLUTION OPHTHALMIC
Status: COMPLETED | OUTPATIENT
Start: 2023-05-12 | End: 2023-05-12

## 2023-05-12 RX ORDER — KETOROLAC TROMETHAMINE 5 MG/ML
1 SOLUTION OPHTHALMIC SEE ADMIN INSTRUCTIONS
Status: COMPLETED | OUTPATIENT
Start: 2023-05-12 | End: 2023-05-12

## 2023-05-12 RX ORDER — TROPICAMIDE 10 MG/ML
1 SOLUTION/ DROPS OPHTHALMIC SEE ADMIN INSTRUCTIONS
Status: DISCONTINUED | OUTPATIENT
Start: 2023-05-12 | End: 2023-05-12 | Stop reason: HOSPADM

## 2023-05-12 RX ADMIN — PHENYLEPHRINE HYDROCHLORIDE 1 DROP: 100 SOLUTION/ DROPS OPHTHALMIC at 07:18

## 2023-05-12 RX ADMIN — FENTANYL CITRATE 100 MCG: 50 INJECTION INTRAMUSCULAR; INTRAVENOUS at 08:06

## 2023-05-12 RX ADMIN — TROPICAMIDE 1 DROP: 10 SOLUTION/ DROPS OPHTHALMIC at 07:05

## 2023-05-12 RX ADMIN — POVIDONE-IODINE: 5 SOLUTION OPHTHALMIC at 07:06

## 2023-05-12 RX ADMIN — SODIUM CHLORIDE: 9 INJECTION, SOLUTION INTRAVENOUS at 07:08

## 2023-05-12 RX ADMIN — MIDAZOLAM 2 MG: 1 INJECTION INTRAMUSCULAR; INTRAVENOUS at 07:49

## 2023-05-12 RX ADMIN — TETRACAINE HYDROCHLORIDE 1 DROP: 5 SOLUTION OPHTHALMIC at 06:55

## 2023-05-12 RX ADMIN — PHENYLEPHRINE HYDROCHLORIDE 1 DROP: 100 SOLUTION/ DROPS OPHTHALMIC at 07:19

## 2023-05-12 RX ADMIN — CIPROFLOXACIN 1 DROP: 3 SOLUTION OPHTHALMIC at 06:55

## 2023-05-12 RX ADMIN — TROPICAMIDE 1 DROP: 10 SOLUTION/ DROPS OPHTHALMIC at 06:55

## 2023-05-12 RX ADMIN — KETOROLAC TROMETHAMINE 1 DROP: 0.5 SOLUTION OPHTHALMIC at 06:55

## 2023-05-12 ASSESSMENT — PAIN SCALES - GENERAL
PAINLEVEL_OUTOF10: 0
PAINLEVEL_OUTOF10: 0

## 2023-05-12 ASSESSMENT — PAIN - FUNCTIONAL ASSESSMENT: PAIN_FUNCTIONAL_ASSESSMENT: 0-10

## 2023-05-12 ASSESSMENT — LIFESTYLE VARIABLES: SMOKING_STATUS: 1

## 2023-05-12 NOTE — DISCHARGE INSTRUCTIONS
Post-Operative Instructions After Cataract Surgery  [facility]   Kamilla Velez M.D.    [phone]    Right    @ED@    Patient Name: Phuong Rodriguez  : 1950  MRN: [id number]      Wear your protective shield at bedtime and nap time for 1 week to prevent accidentally rubbing or bumping your eye. The post-operative drops are VERY IMPORTANT. Use them as directed on the drop schedule given to you the day of surgery. Do not lift over 25 lbs for the first week. DO NOT RUB YOUR EYE. You may wash your hair 24 hours after surgery, but avoid getting water in your eye for the first 5 days. Use a dry wash cloth to protect water from getting in your eye while taking a shower. No eye makeup for 1 week. You may return to work anytime provided your job does not require heavy lifting or straining. If you work in a carlos environment, please take one week off work after surgery. CALL THE OFFICE IMMEDIATELY IF YOU EXPERIENCE ANY OF THE FOLLOWING                   [phone]  Veil or curtain coming across vision. Sudden decrease in vision. Shower of NEW floaters. Increase in pain. Flashes of light.

## 2023-05-12 NOTE — ANESTHESIA POSTPROCEDURE EVALUATION
Department of Anesthesiology  Postprocedure Note    Patient: Arlin Kate  MRN: [id number]  YOB: 1950  Date of evaluation: 5/12/2023      Procedure Summary     Date: 05/12/23 Room / Location: [address]    Anesthesia Start: 3225 Anesthesia Stop: 0073    Procedure: PHACOEMULSIFICATION WITH INTRAOCULAR LENS IMPLANT - RIGHT EYE (Right: Eye) Diagnosis:       Combined forms of age-related cataract of right eye      (Combined forms of age-related cataract of right eye)    Surgeons: Naomie Stringer MD Responsible Provider: Chapincito Ibarra MD    Anesthesia Type: MAC ASA Status: 2          Anesthesia Type: No value filed.     Santiago Phase I: Santiago Score: 10    Santiago Phase II: Santiago Score: 10      Anesthesia Post Evaluation    Patient location during evaluation: PACU  Level of consciousness: awake and alert  Airway patency: patent  Nausea & Vomiting: no nausea and no vomiting  Complications: no  Cardiovascular status: blood pressure returned to baseline  Respiratory status: acceptable  Hydration status: euvolemic  Comments: Postoperative Anesthesia Note    Name:    Arlin Kate  MRN:      [id number]    Patient Vitals in the past 12 hrs:  05/12/23 0816, BP:(!) 124/54, Pulse:71, Resp:18, SpO2:97 %  05/12/23 0814, BP:124/67, Temp:97 °F (36.1 °C), Temp src:Temporal, Resp:16, SpO2:97 %  05/12/23 0809, Pulse:69  05/12/23 0650, BP:118/64, Temp:(!) 96.6 °F (35.9 °C), Temp src:Temporal, Pulse:69, Resp:15, SpO2:99 %, Height:5' 1\" (1.549 m), Weight:182 lb (82.6 kg)     LABS:    CBC  Lab Results       Component                Value               Date/Time                  WBC                      7.9                 08/18/2021 11:27 AM        HGB                      13.4                08/18/2021 11:27 AM        HCT                      40.3                08/18/2021 11:27 AM        PLT                      344                 08/18/2021 11:27 AM   RENAL  Lab Results

## 2023-05-12 NOTE — OP NOTE
Kurt Jade    OPERATIVE NOTE    Preoperative Diagnosis: Cataract right eye    Postoperative Diagnosis: Cataract right eye    Procedure: Phacoemulsification with intraocular lens implantation, right eye  Surgeon: Tani Cervantes MD    Anesthesia: MAC, topical.    Complications: none    Estimated blood loss: less than 1 ml. Specimens: none    Indications for procedure: The patient is a 68y.o. year old with decreased vision, glare and halos around lights, and trouble with activities of daily living. Examination revealed a visually significant cataract in the right eye. Risks, benefits, and alternatives to surgery were discussed with the patient and the patient elected to proceed with phacoemulsification with lens implantation. Details of the procedure: Following informed consent, the patient was taken to the operating room and placed in the supine position. Monitored anesthesia care was administered. The eye was prepped and draped in the usual sterile fashion using aseptic technique for cataract surgery. A side port incision was made. 1% preservative free lidocaine was injected through the side port incision for topical anesthesia. The eye was filled with viscoelastic and a 2.4 mm keratome blade was used to make a 3-plane clear corneal incision in the temporal cornea. The cystitome was used to make a tear in the anterior capsule and a Utrata forceps was used to make a complete curvilinear capsulorrhexis. The lens was hydrodissected and freely rotated. Phacoemulsification was performed. Irrigation/aspiration was used to remove all cortical material from the capsular bag. The eye was filled with viscoelastic and a foldable posterior chamber intraocular lens was injected into the capsular bag. The lens was rotated to the appropriate axis as needed. Irrigation/aspiration was used to remove all excess viscoelastic.   The eye was pressurized with BSS and the wounds were check for leaks and none were

## 2023-05-12 NOTE — ANESTHESIA PRE PROCEDURE
Department of Anesthesiology  Preprocedure Note       Name:  Maggy Ravi   Age:  68 y.o.  :  1950                                          MRN:  [id number]         Date:  2023      Surgeon: Nancy Meadows):  Judge Aaln MD    Procedure: Procedure(s):  PHACOEMULSIFICATION WITH INTRAOCULAR LENS IMPLANT - RIGHT EYE    Medications prior to admission:   Prior to Admission medications    Medication Sig Start Date End Date Taking? Authorizing Provider   levothyroxine (SYNTHROID) 125 MCG tablet Take 1 tablet by mouth daily FOLLOW UP BEFORE REFILL 23   PRAMOD Acosta CNP   lisinopril-hydroCHLOROthiazide (PRINZIDE;ZESTORETIC) 20-12.5 MG per tablet Take 1 tablet by mouth daily    Historical Provider, MD   buPROPion (WELLBUTRIN SR) 150 MG extended release tablet TAKE 1 TABLET BY MOUTH IN THE MORNING FOR 3 DAYS AND THEN TAKE 1 TABLET BY MOUTH TWICE DAILY WITH 2ND DOSE PRIOR TO 4 PM 22   PRAMOD Acosta CNP   tiZANidine (ZANAFLEX) 4 MG tablet Take 1 tablet by mouth nightly as needed (spasms) 22   PRAMOD Acosta CNP   omeprazole (PRILOSEC) 40 MG delayed release capsule TAKE 1 CAPSULE EVERY DAY 22   PRAMOD Acosta CNP   meloxicam (MOBIC) 15 MG tablet TAKE 1 TABLET BY MOUTH EVERY DAY AS NEEDED INFLAMMATION 22   PRAMOD Acosta CNP   gabapentin (NEURONTIN) 300 MG capsule Take 1 capsule by mouth 3 times daily for 30 days. Take 300 mg by mouth 3 times daily as needed for nerve pain.  22  PRAMOD Acosta CNP   estradiol (ESTRACE) 1 MG tablet Take 0.5 tablets by mouth daily 22   PRAMOD Acosta CNP   Magnesium 200 MG CHEW Take 1 each by mouth daily     Historical Provider, MD   Specialty Vitamins Products (MM BIOTIN/KERATIN PO) Take by mouth    Historical Provider, MD   Cholecalciferol (VITAMIN D3) 2000 UNITS TABS Take 2 tablets by mouth daily    Historical

## 2023-06-05 ENCOUNTER — NURSE ONLY (OUTPATIENT)
Dept: FAMILY MEDICINE CLINIC | Age: 73
End: 2023-06-05
Payer: MEDICARE

## 2023-06-05 DIAGNOSIS — E03.9 ACQUIRED HYPOTHYROIDISM: ICD-10-CM

## 2023-06-05 LAB
T4 FREE SERPL-MCNC: 1.9 NG/DL (ref 0.9–1.8)
TSH SERPL DL<=0.005 MIU/L-ACNC: 0.05 UIU/ML (ref 0.27–4.2)

## 2023-06-05 PROCEDURE — 36415 COLL VENOUS BLD VENIPUNCTURE: CPT | Performed by: FAMILY MEDICINE

## 2023-06-08 RX ORDER — LEVOTHYROXINE SODIUM 112 UG/1
112 TABLET ORAL DAILY
Qty: 90 TABLET | Refills: 0 | Status: SHIPPED | OUTPATIENT
Start: 2023-06-08

## 2023-06-15 PROBLEM — E66.01 SEVERE OBESITY (BMI 35.0-39.9) WITH COMORBIDITY (HCC): Status: ACTIVE | Noted: 2023-06-15

## 2023-06-23 ENCOUNTER — TELEPHONE (OUTPATIENT)
Dept: FAMILY MEDICINE CLINIC | Age: 73
End: 2023-06-23

## 2023-06-23 RX ORDER — DOXYCYCLINE HYCLATE 100 MG
100 TABLET ORAL 2 TIMES DAILY
Qty: 10 TABLET | Refills: 0 | Status: SHIPPED | OUTPATIENT
Start: 2023-06-23 | End: 2023-06-28

## 2023-06-23 NOTE — TELEPHONE ENCOUNTER
Doxy sent to pharmacy -take as directed - caution w/ sun exposure  Please eat yogurt daily or take probiotic supplement while on this antibiotic and for additional week after finishing the antibiotic to protect your GI tract.

## 2023-06-23 NOTE — TELEPHONE ENCOUNTER
Patient called and said she has a marvin under her right breast again and would like dr. Samara Tolbert to call in an antibiotic to treat it    She said she is going to clean it and dress it at home     [facility]/PHARMACY [address] [facility] [address] [phone] - F [phone]

## 2023-07-10 DIAGNOSIS — E03.9 ACQUIRED HYPOTHYROIDISM: ICD-10-CM

## 2023-07-10 RX ORDER — LEVOTHYROXINE SODIUM 137 UG/1
TABLET ORAL
Qty: 90 TABLET | Refills: 0 | OUTPATIENT
Start: 2023-07-10

## 2023-07-10 NOTE — TELEPHONE ENCOUNTER
CARLOTTA 6/15/23 WITH DR EDWARDS FOR THYROID NV NONE   Disp Refills Start End    levothyroxine (SYNTHROID) 137 MCG tablet (Discontinued) 90 tablet 0 4/11/2023 4/21/2023    Sig: TAKE 1 TABLET BY MOUTH EVERY DAY    Sent to pharmacy as: Levothyroxine Sodium 137 MCG Oral Tablet (SYNTHROID)    Reason for Discontinue: REORDER    Cosign for Ordering: Accepted by Deandra Winter MD on 4/11/2023  6:06 PM    E-Prescribing Status: Receipt confirmed by pharmacy (4/11/2023 11:49 AM EDT    DOSAGE CHANGE

## 2023-07-26 DIAGNOSIS — E03.9 ACQUIRED HYPOTHYROIDISM: ICD-10-CM

## 2023-07-26 RX ORDER — LEVOTHYROXINE SODIUM 112 UG/1
112 TABLET ORAL DAILY
Qty: 90 TABLET | Refills: 0 | Status: SHIPPED | OUTPATIENT
Start: 2023-07-26

## 2023-07-31 ENCOUNTER — OFFICE VISIT (OUTPATIENT)
Dept: FAMILY MEDICINE CLINIC | Age: 73
End: 2023-07-31

## 2023-07-31 VITALS
DIASTOLIC BLOOD PRESSURE: 88 MMHG | HEIGHT: 61 IN | WEIGHT: 192 LBS | BODY MASS INDEX: 36.25 KG/M2 | HEART RATE: 68 BPM | SYSTOLIC BLOOD PRESSURE: 138 MMHG

## 2023-07-31 DIAGNOSIS — S83.206S ACUTE TORN MENISCUS OF KNEE, RIGHT, SEQUELA: Primary | ICD-10-CM

## 2023-07-31 DIAGNOSIS — I10 ESSENTIAL HYPERTENSION: ICD-10-CM

## 2023-07-31 DIAGNOSIS — Z01.818 PRE-OP EXAM: ICD-10-CM

## 2023-07-31 NOTE — PROGRESS NOTES
Preoperative Consultation      Glendy Tong  YOB: 1950    Date of Service:  7/31/2023    Vitals:    07/31/23 1527   BP: 138/88   Site: Right Upper Arm   Position: Sitting   Cuff Size: Medium Adult   Pulse: 68   Weight: 192 lb (87.1 kg)   Height: 5' 1\" (1.549 m)      Wt Readings from Last 2 Encounters:   07/31/23 192 lb (87.1 kg)   06/15/23 195 lb (88.5 kg)     BP Readings from Last 3 Encounters:   07/31/23 138/88   06/15/23 118/72   05/12/23 (!) 124/54        Chief Complaint   Patient presents with    [facility]op Exam     [facility]   Torn meniscus right knee   8/4/23     Knee Pain     Pre op exam for left knee torn meniscus - no injury noted     Allergies   Allergen Reactions    Codeine      delusions    Levothyroxine      Not tolerated as well as armour thyroid  Fatigue, brain fog    Prednisone Nausea Only, Palpitations and Other (See Comments)     Dizziness, swollen eye,heart paplitations     Outpatient Medications Marked as Taking for the 7/31/23 encounter (Office Visit) with PRAMOD rBandt - CNP   Medication Sig Dispense Refill    levothyroxine (SYNTHROID) 112 MCG tablet TAKE 1 TABLET BY MOUTH DAILY FOLLOW UP BEFORE REFILL 90 tablet 0    thyroid (ARMOUR THYROID) 120 MG tablet Take 1 tablet by mouth daily 60 tablet 2    lisinopril-hydroCHLOROthiazide (PRINZIDE;ZESTORETIC) 20-12.5 MG per tablet Take 1 tablet by mouth daily      buPROPion (WELLBUTRIN SR) 150 MG extended release tablet TAKE 1 TABLET BY MOUTH IN THE MORNING FOR 3 DAYS AND THEN TAKE 1 TABLET BY MOUTH TWICE DAILY WITH 2ND DOSE PRIOR TO 4  tablet 3    tiZANidine (ZANAFLEX) 4 MG tablet Take 1 tablet by mouth nightly as needed (spasms) 90 tablet 3    omeprazole (PRILOSEC) 40 MG delayed release capsule TAKE 1 CAPSULE EVERY DAY 90 capsule 3    meloxicam (MOBIC) 15 MG tablet TAKE 1 TABLET BY MOUTH EVERY DAY AS NEEDED INFLAMMATION 90 tablet 3    gabapentin (NEURONTIN) 300 MG capsule Take 1 capsule by mouth 3

## 2023-08-01 LAB
ANION GAP SERPL CALCULATED.3IONS-SCNC: 13 MMOL/L (ref 3–16)
BUN SERPL-MCNC: 17 MG/DL (ref 7–20)
CALCIUM SERPL-MCNC: 9.7 MG/DL (ref 8.3–10.6)
CHLORIDE SERPL-SCNC: 103 MMOL/L (ref 99–110)
CO2 SERPL-SCNC: 25 MMOL/L (ref 21–32)
CREAT SERPL-MCNC: 0.6 MG/DL (ref 0.6–1.2)
GFR SERPLBLD CREATININE-BSD FMLA CKD-EPI: >60 ML/MIN/{1.73_M2}
GLUCOSE SERPL-MCNC: 92 MG/DL (ref 70–99)
POTASSIUM SERPL-SCNC: 4.2 MMOL/L (ref 3.5–5.1)
SODIUM SERPL-SCNC: 141 MMOL/L (ref 136–145)

## 2023-10-19 ENCOUNTER — TELEPHONE (OUTPATIENT)
Dept: FAMILY MEDICINE CLINIC | Age: 73
End: 2023-10-19

## 2023-10-19 RX ORDER — LISINOPRIL AND HYDROCHLOROTHIAZIDE 20; 12.5 MG/1; MG/1
1 TABLET ORAL DAILY
Qty: 30 TABLET | Refills: 0 | Status: SHIPPED | OUTPATIENT
Start: 2023-10-19

## 2023-10-24 ENCOUNTER — TELEPHONE (OUTPATIENT)
Dept: FAMILY MEDICINE CLINIC | Age: 73
End: 2023-10-24

## 2023-10-24 RX ORDER — LISINOPRIL AND HYDROCHLOROTHIAZIDE 20; 12.5 MG/1; MG/1
1 TABLET ORAL DAILY
Qty: 90 TABLET | Refills: 0 | Status: SHIPPED | OUTPATIENT
Start: 2023-10-24

## 2023-12-05 LAB
ESTIMATED AVERAGE GLUCOSE: 117
HBA1C MFR BLD: 5.7 %

## 2024-01-12 RX ORDER — ESTRADIOL 1 MG/1
0.5 TABLET ORAL DAILY
Qty: 90 TABLET | Refills: 0 | Status: SHIPPED | OUTPATIENT
Start: 2024-01-12

## 2024-01-12 RX ORDER — ESTRADIOL 1 MG/1
0.5 TABLET ORAL DAILY
COMMUNITY
End: 2024-01-12 | Stop reason: SDUPTHER

## 2024-01-19 DIAGNOSIS — M51.36 DDD (DEGENERATIVE DISC DISEASE), LUMBAR: ICD-10-CM

## 2024-01-19 DIAGNOSIS — M50.20 CERVICAL HERNIATED DISC: ICD-10-CM

## 2024-01-19 RX ORDER — TIZANIDINE 4 MG/1
4 TABLET ORAL NIGHTLY PRN
Qty: 90 TABLET | Refills: 0 | Status: SHIPPED | OUTPATIENT
Start: 2024-01-19

## 2024-04-11 DIAGNOSIS — M50.20 CERVICAL HERNIATED DISC: ICD-10-CM

## 2024-04-11 DIAGNOSIS — M51.36 DDD (DEGENERATIVE DISC DISEASE), LUMBAR: ICD-10-CM

## 2024-04-11 RX ORDER — TIZANIDINE 4 MG/1
4 TABLET ORAL NIGHTLY PRN
Qty: 90 TABLET | Refills: 0 | Status: SHIPPED | OUTPATIENT
Start: 2024-04-11

## 2024-06-10 LAB — MAMMOGRAPHY, EXTERNAL: ABNORMAL

## 2024-06-20 ENCOUNTER — COMMUNITY OUTREACH (OUTPATIENT)
Dept: FAMILY MEDICINE CLINIC | Age: 74
End: 2024-06-20

## 2024-06-20 NOTE — PROGRESS NOTES
Patient's HM shows they are overdue for Mammogram.   [employer] and  files searched without success.

## 2024-07-15 DIAGNOSIS — M51.36 DDD (DEGENERATIVE DISC DISEASE), LUMBAR: ICD-10-CM

## 2024-07-15 DIAGNOSIS — M50.20 CERVICAL HERNIATED DISC: ICD-10-CM

## 2024-07-15 RX ORDER — ESTRADIOL 1 MG/1
0.5 TABLET ORAL DAILY
Qty: 45 TABLET | Refills: 1 | OUTPATIENT
Start: 2024-07-15

## (undated) DEVICE — MEDIA CONTRAST RX ISOVUE-300 61% 30ML VIALS

## (undated) DEVICE — UNIVERSAL BLOCK TRAY: Brand: AVANOS*

## (undated) DEVICE — NEEDLE EPI L3.5IN OD20GA CLR POLYCARB HUB WNG N DEHP TUOHY

## (undated) DEVICE — Device: Brand: JELCO

## (undated) DEVICE — SYRINGE MED 3ML CLR PLAS STD N CTRL LUERLOCK TIP DISP

## (undated) DEVICE — SOLUTION IRRIG BSS ST 500ML

## (undated) DEVICE — DISPOSABLE OR TOWEL: Brand: CARDINAL HEALTH

## (undated) DEVICE — STERILE POLYISOPRENE POWDER-FREE SURGICAL GLOVES: Brand: PROTEXIS

## (undated) DEVICE — Device

## (undated) DEVICE — NDL,TUOHY EPID,22GX3.5",METAL STYLET: Brand: MEDLINE

## (undated) DEVICE — PEN: MARKING STD 100/CS: Brand: MEDICAL ACTION INDUSTRIES

## (undated) DEVICE — SURGICAL PROC PACK SHT WEST V4

## (undated) DEVICE — NEEDLE EPI 22GA L2IN CLR HUB N WNG PERIFIX TUOHY

## (undated) DEVICE — STANDARD HYPODERMIC NEEDLE,POLYPROPYLENE HUB: Brand: MONOJECT

## (undated) DEVICE — SOLUTION IRRIG 500ML STRL H2O NONPYROGENIC

## (undated) DEVICE — Device: Brand: MEDEX

## (undated) DEVICE — TRAP SPEC RETRV CLR PLAS POLYP IN LN SUCT QUIK CTCH

## (undated) DEVICE — CHLORAPREP 26ML ORANGE

## (undated) DEVICE — Device: Brand: DISPOSABLE ELECTROSURGICAL SNARE

## (undated) DEVICE — SET EXTN L7IN PRIMING VOL 0.5ML PRSS RATE STD BOR 1 REM

## (undated) DEVICE — PORT VLV 2 W NDL FREE SMRTSITE

## (undated) DEVICE — MOUTHPIECE ENDOSCP L CTRL OPN AND SIDE PORTS DISP

## (undated) DEVICE — SYRINGE MED 30ML STD CLR PLAS LUERLOCK TIP N CTRL DISP

## (undated) DEVICE — SYRINGE TB 1ML NDL 25GA L0.625IN PLAS SLIP TIP CONVENTIONAL

## (undated) DEVICE — SOLUTION IV IRRIG WATER 500ML POUR BRL ST 2F7113

## (undated) DEVICE — SYRINGE MED 50ML LUERLOCK TIP

## (undated) DEVICE — GLOVE SURG SZ 75 CRM LTX FREE POLYISOPRENE POLYMER BEAD ANTI

## (undated) DEVICE — WIPE INSTR W73XL73CM VISITEC

## (undated) DEVICE — PROCEDURE KIT ENDOSCP CUST

## (undated) DEVICE — SYRINGE INFL 60ML DISP ALLIANCE II

## (undated) DEVICE — GOWN AURORA NONREINF LG: Brand: MEDLINE INDUSTRIES, INC.

## (undated) DEVICE — DILATOR ENDOSCP L180CM DIA6FR BLLN L8CM DIA54-60FR

## (undated) DEVICE — BW-412T DISP COMBO CLEANING BRUSH: Brand: SINGLE USE COMBINATION CLEANING BRUSH

## (undated) DEVICE — SET VLV 3 PC AWS DISPOSABLE GRDIAN SCOPEVALET